# Patient Record
Sex: FEMALE | Race: WHITE | NOT HISPANIC OR LATINO | Employment: FULL TIME | URBAN - METROPOLITAN AREA
[De-identification: names, ages, dates, MRNs, and addresses within clinical notes are randomized per-mention and may not be internally consistent; named-entity substitution may affect disease eponyms.]

---

## 2024-06-27 ENCOUNTER — OFFICE VISIT (OUTPATIENT)
Dept: OBSTETRICS AND GYNECOLOGY | Facility: CLINIC | Age: 39
End: 2024-06-27
Payer: COMMERCIAL

## 2024-06-27 VITALS
SYSTOLIC BLOOD PRESSURE: 130 MMHG | DIASTOLIC BLOOD PRESSURE: 78 MMHG | WEIGHT: 184.2 LBS | BODY MASS INDEX: 32.64 KG/M2 | HEIGHT: 63 IN

## 2024-06-27 DIAGNOSIS — Z30.09 FAMILY PLANNING: Primary | ICD-10-CM

## 2024-06-27 RX ORDER — UREA 10 %
500 LOTION (ML) TOPICAL DAILY
COMMUNITY

## 2024-06-27 RX ORDER — GINSENG 100 MG
CAPSULE ORAL
COMMUNITY

## 2024-06-27 RX ORDER — MULTIPLE VITAMINS W/ MINERALS TAB 9MG-400MCG
1 TAB ORAL DAILY
COMMUNITY

## 2024-06-27 NOTE — PROGRESS NOTES
"            Chief Complaint   Patient presents with    Contraception       Subjective   HPI  Brenda Trejo is a 38 y.o. female, , LMP was on Patient's last menstrual period was 2024 (exact date). who presents for preconceptual counseling.    Her periods are regular every 25-30 days, lasting 4 days. Dysmenorrhea: mild to severe. The patient states that her dysmenorrhea is typically mild. The patient reports additional symptoms as none. She stopped OCPs 1 year ago. She just became sexually active a few months ago. She is currently trying to conceive. She has been \"actively\" trying to conceive for a couple of months. She has intercourse approximately 1 times per day. Her past medical history is noted for: no medical issues. She has not had a previous workup for infertility. Partner Status: Marital Status: . Her partner has fathered children. Her partner has fathered 2 children with most recent being 17 years old. His past medical history is notable for Diabetes (Metformin), HTN (Losartan), and rarely drinks alcohol.    Patient scheduled to come back for annual exam on 10/17/2024.    Current Outpatient Medications on File Prior to Visit   Medication Sig Dispense Refill    Calcium 250 MG capsule Take  by mouth.      Cholecalciferol 1.25 MG (13843 UT) tablet Take 1 tablet by mouth 1 (One) Time Per Week. 12 tablet 0    Dupixent 300 MG/2ML solution pen-injector Every 14 (Fourteen) Days.      multivitamin with minerals tablet tablet Take 1 tablet by mouth Daily.      thiamine (VITAMIN B-1) 100 MG tablet  tablet Take 1 tablet by mouth Daily.      vitamin B-12 (CYANOCOBALAMIN) 500 MCG tablet Take 1 tablet by mouth Daily.      Zinc 50 MG tablet Take  by mouth.       No current facility-administered medications on file prior to visit.        Additional OB/GYN History   Last Pap :   Last Completed Pap Smear            PAP SMEAR (Every 3 Years) Next due on 10/25/2024      10/25/2021  SCANNED - PAP SMEAR    " 2020  Done - negative                  History of abnormal Pap smear:  Yes  Exercises Regularly: Yes  Feelings of Anxiety or Depression: No  Tobacco Usage?: No   OB History          0    Para   0    Term   0       0    AB   0    Living   0         SAB   0    IAB   0    Ectopic   0    Molar   0    Multiple   0    Live Births   0                  Current Outpatient Medications:     Calcium 250 MG capsule, Take  by mouth., Disp: , Rfl:     Cholecalciferol 1.25 MG (86440 UT) tablet, Take 1 tablet by mouth 1 (One) Time Per Week., Disp: 12 tablet, Rfl: 0    Dupixent 300 MG/2ML solution pen-injector, Every 14 (Fourteen) Days., Disp: , Rfl:     multivitamin with minerals tablet tablet, Take 1 tablet by mouth Daily., Disp: , Rfl:     thiamine (VITAMIN B-1) 100 MG tablet  tablet, Take 1 tablet by mouth Daily., Disp: , Rfl:     vitamin B-12 (CYANOCOBALAMIN) 500 MCG tablet, Take 1 tablet by mouth Daily., Disp: , Rfl:     Zinc 50 MG tablet, Take  by mouth., Disp: , Rfl:      Past Medical History:   Diagnosis Date    Abnormal Pap smear of cervix     Allergic rhinitis     Anxiety     Asthma     controlled    C. difficile diarrhea     10/2021    Chronic back pain 2010    r/t trauma, failed back surgery x 2, denies NSAIDS/steroids/narcotics    Claustrophobia     Depression     Dyspepsia     Dyspnea on exertion     Eczema     on Dupixent    Fatigue     Gout     Migraine     Morbid obesity     Urinary tract infection         Past Surgical History:   Procedure Laterality Date    BREAST BIOPSY  10/2019    CARPAL TUNNEL RELEASE Bilateral     COLONOSCOPY      GASTRIC SLEEVE LAPAROSCOPIC N/A 2023    Procedure: GASTRIC SLEEVE LAPAROSCOPIC WITH [place] WITH ESOPHAGOGASTRODUODENOSCOPY;  Surgeon: Ashley Leong MD;  Location: [facility];  Service: Robotics - [employer];  Laterality: N/A;    LUMBAR DISCECTOMY Left 2021    Procedure: LUMBAR MICRODISCECTOMY L5-S1;  Surgeon: James Estrada  "MD Agustín;  Location: [facility];  Service: Neurosurgery;  Laterality: Left;    MOUTH SURGERY  2007    Gum Grafts    TONSILLECTOMY AND ADENOIDECTOMY  2021    WISDOM TOOTH EXTRACTION  2007       The additional following portions of the patient's history were reviewed and updated as appropriate: allergies, current medications, past family history, past medical history, past social history, past surgical history, and problem list.    Review of Systems   Constitutional: Negative.    HENT: Negative.     Eyes: Negative.    Respiratory: Negative.     Cardiovascular: Negative.    Gastrointestinal: Negative.    Endocrine: Negative.    Genitourinary: Negative.    Musculoskeletal: Negative.    Skin: Negative.    Allergic/Immunologic: Negative.    Neurological: Negative.    Hematological: Negative.    Psychiatric/Behavioral: Negative.       All other systems reviewed and are negative.     I have reviewed and agree with the HPI, ROS, and historical information as entered above. Tiana Gonzáles MD      Objective   /78   Ht 158.8 cm (62.5\")   Wt 83.6 kg (184 lb 3.2 oz)   LMP 06/24/2024 (Exact Date)   BMI 33.15 kg/m²     Physical Exam  Vitals and nursing note reviewed.   Constitutional:       Appearance: Normal appearance. She is well-developed.   HENT:      Head: Normocephalic and atraumatic.   Pulmonary:      Effort: Pulmonary effort is normal.      Breath sounds: Normal breath sounds.   Abdominal:      General: There is no distension.      Palpations: Abdomen is soft. Abdomen is not rigid. There is no mass.      Tenderness: There is no abdominal tenderness. There is no guarding or rebound.   Musculoskeletal:      Cervical back: Normal range of motion.   Skin:     General: Skin is warm and dry.   Neurological:      Mental Status: She is alert and oriented to person, place, and time.   Psychiatric:         Mood and Affect: Mood normal.         Behavior: Behavior normal.         Assessment & Plan     Assessment " and Plan    Problem List Items Addressed This Visit    None  Visit Diagnoses       Family planning    -  Primary            Ovulatory cycles discussed with the patient.  She understands the concept of timed intercourse.  We also discussed the importance of prenatal vitamins and folic acid.  Reviewed that only 30% of people get pregnant by 3 months, 50% x 6 months, and 90% by year.  Should the patient attempt to conceive for greater than 1 year she should return to the clinic for evaluation.  Reassurance.  Advised patient to call with a positive urine pregnancy test.  Return for Annual physical.  Total time spent today with Brenda  was 30 minutes (level 3).  Off this time, 70% was spent face-to-face time coordinating care, answering her questions and counseling regarding pathophysiology of her presenting problem along with plans for any diagnositc work-up and treatment.      Tiana Gonzáles MD  06/27/2024

## 2024-07-22 ENCOUNTER — TELEPHONE (OUTPATIENT)
Dept: OBSTETRICS AND GYNECOLOGY | Facility: CLINIC | Age: 39
End: 2024-07-22

## 2024-07-22 NOTE — TELEPHONE ENCOUNTER
Caller: Brenda Trejo    Relationship to patient: Self    Best call back number: [phone] CALL ANYTIME, IT IS OKAY TO LVM.     Chief complaint: PREGNANCY CONFIRMATION    Type of visit: ULTRASOUND AND NEW OB     Requested date: AS LATE IN DAY AS POSSIBLE.     If rescheduling, when is the original appointment: NA    Additional notes: ASHM[facility] PATIENT CALLED TO SCHEDULE NEW OB APPT. POSITIVE HOME PREGNANCY TEST, LMP IS 06/25/24.   UNABLE TO WARM TRANSFER.

## 2024-07-26 ENCOUNTER — TELEPHONE (OUTPATIENT)
Dept: OBSTETRICS AND GYNECOLOGY | Facility: CLINIC | Age: 39
End: 2024-07-26
Payer: COMMERCIAL

## 2024-07-26 NOTE — TELEPHONE ENCOUNTER
Provider:  DR DEAN    Caller:DEREK THORNTON    Phone Number:[phone]     Reason for Call:PATIENT S CALLING WITH CONCERNS BECAUSE WHEN SHE WENT TO THE RESTROOM EARLIER AND WIPES SHE HAD SPOTTING OF RED BLOOD//PATIENT IS CONCERNED AND NOT SURE WHAT SHE NEEDS TO DO//PLEASE FOLLOW UP

## 2024-07-26 NOTE — TELEPHONE ENCOUNTER
Pt is approx 5 weeks.   LMP: 06/25/2024  NOB scheduled with JNA: 08/19/2024  MBT: AB+    Pt reports she had a small amount of spotting this morning when she went to the bathroom. She reports the bleeding is light pink and only 1 spot of bright red. She reports mild cramping. She states she did have IC last night. I let her know it is likely related to the IC to monitor and if bleeding becomes heavy or she has severe pain to go to the ED but to keep an eye on it. She VU

## 2024-08-11 ENCOUNTER — APPOINTMENT (OUTPATIENT)
Dept: ULTRASOUND IMAGING | Facility: HOSPITAL | Age: 39
End: 2024-08-11
Payer: COMMERCIAL

## 2024-08-11 ENCOUNTER — HOSPITAL ENCOUNTER (EMERGENCY)
Facility: HOSPITAL | Age: 39
Discharge: HOME | End: 2024-08-11
Attending: EMERGENCY MEDICINE | Admitting: EMERGENCY MEDICINE
Payer: COMMERCIAL

## 2024-08-11 VITALS
WEIGHT: 177 LBS | TEMPERATURE: 98.1 F | DIASTOLIC BLOOD PRESSURE: 88 MMHG | OXYGEN SATURATION: 97 % | SYSTOLIC BLOOD PRESSURE: 118 MMHG | BODY MASS INDEX: 31.36 KG/M2 | RESPIRATION RATE: 18 BRPM | HEIGHT: 63 IN | HEART RATE: 72 BPM

## 2024-08-11 DIAGNOSIS — R55 SYNCOPE AND COLLAPSE: Primary | ICD-10-CM

## 2024-08-11 DIAGNOSIS — Z86.79 ATRIAL FIBRILLATION, CURRENTLY IN SINUS RHYTHM: ICD-10-CM

## 2024-08-11 DIAGNOSIS — O20.0 THREATENED MISCARRIAGE: ICD-10-CM

## 2024-08-11 LAB
ALBUMIN SERPL-MCNC: 4.3 G/DL (ref 3.5–5.2)
ALBUMIN/GLOB SERPL: 1.8 G/DL
ALP SERPL-CCNC: 52 U/L (ref 39–117)
ALT SERPL W P-5'-P-CCNC: 11 U/L (ref 1–33)
ANION GAP SERPL CALCULATED.3IONS-SCNC: 9 MMOL/L (ref 5–15)
AST SERPL-CCNC: 15 U/L (ref 1–32)
BASOPHILS # BLD AUTO: 0.02 10*3/MM3 (ref 0–0.2)
BASOPHILS NFR BLD AUTO: 0.3 % (ref 0–1.5)
BILIRUB SERPL-MCNC: 0.5 MG/DL (ref 0–1.2)
BILIRUB UR QL STRIP: NEGATIVE
BUN SERPL-MCNC: 13 MG/DL (ref 6–20)
BUN/CREAT SERPL: 19.4 (ref 7–25)
CALCIUM SPEC-SCNC: 8.7 MG/DL (ref 8.6–10.5)
CHLORIDE SERPL-SCNC: 104 MMOL/L (ref 98–107)
CLARITY UR: CLEAR
CO2 SERPL-SCNC: 23 MMOL/L (ref 22–29)
COLOR UR: YELLOW
CREAT SERPL-MCNC: 0.67 MG/DL (ref 0.57–1)
DEPRECATED RDW RBC AUTO: 38.8 FL (ref 37–54)
EGFRCR SERPLBLD CKD-EPI 2021: 114.2 ML/MIN/1.73
EOSINOPHIL # BLD AUTO: 0.07 10*3/MM3 (ref 0–0.4)
EOSINOPHIL NFR BLD AUTO: 1.1 % (ref 0.3–6.2)
ERYTHROCYTE [DISTWIDTH] IN BLOOD BY AUTOMATED COUNT: 12 % (ref 12.3–15.4)
GLOBULIN UR ELPH-MCNC: 2.4 GM/DL
GLUCOSE SERPL-MCNC: 88 MG/DL (ref 65–99)
GLUCOSE UR STRIP-MCNC: NEGATIVE MG/DL
HCG INTACT+B SERPL-ACNC: NORMAL MIU/ML
HCT VFR BLD AUTO: 37.9 % (ref 34–46.6)
HGB BLD-MCNC: 12.9 G/DL (ref 12–15.9)
HGB UR QL STRIP.AUTO: NEGATIVE
HOLD SPECIMEN: NORMAL
IMM GRANULOCYTES # BLD AUTO: 0.01 10*3/MM3 (ref 0–0.05)
IMM GRANULOCYTES NFR BLD AUTO: 0.2 % (ref 0–0.5)
KETONES UR QL STRIP: NEGATIVE
LEUKOCYTE ESTERASE UR QL STRIP.AUTO: NEGATIVE
LYMPHOCYTES # BLD AUTO: 1.62 10*3/MM3 (ref 0.7–3.1)
LYMPHOCYTES NFR BLD AUTO: 26 % (ref 19.6–45.3)
MAGNESIUM SERPL-MCNC: 1.9 MG/DL (ref 1.6–2.6)
MCH RBC QN AUTO: 30.3 PG (ref 26.6–33)
MCHC RBC AUTO-ENTMCNC: 34 G/DL (ref 31.5–35.7)
MCV RBC AUTO: 89 FL (ref 79–97)
MONOCYTES # BLD AUTO: 0.28 10*3/MM3 (ref 0.1–0.9)
MONOCYTES NFR BLD AUTO: 4.5 % (ref 5–12)
NEUTROPHILS NFR BLD AUTO: 4.24 10*3/MM3 (ref 1.7–7)
NEUTROPHILS NFR BLD AUTO: 67.9 % (ref 42.7–76)
NITRITE UR QL STRIP: NEGATIVE
NRBC BLD AUTO-RTO: 0 /100 WBC (ref 0–0.2)
PH UR STRIP.AUTO: 7 [PH] (ref 5–8)
PLATELET # BLD AUTO: 199 10*3/MM3 (ref 140–450)
PMV BLD AUTO: 9 FL (ref 6–12)
POTASSIUM SERPL-SCNC: 3.9 MMOL/L (ref 3.5–5.2)
PROT SERPL-MCNC: 6.7 G/DL (ref 6–8.5)
PROT UR QL STRIP: NEGATIVE
RBC # BLD AUTO: 4.26 10*6/MM3 (ref 3.77–5.28)
SODIUM SERPL-SCNC: 136 MMOL/L (ref 136–145)
SP GR UR STRIP: 1.01 (ref 1–1.03)
TROPONIN T SERPL HS-MCNC: <6 NG/L
UROBILINOGEN UR QL STRIP: NORMAL
WBC NRBC COR # BLD AUTO: 6.24 10*3/MM3 (ref 3.4–10.8)
WHOLE BLOOD HOLD COAG: NORMAL
WHOLE BLOOD HOLD SPECIMEN: NORMAL

## 2024-08-11 PROCEDURE — 83735 ASSAY OF MAGNESIUM: CPT | Performed by: EMERGENCY MEDICINE

## 2024-08-11 PROCEDURE — 76817 TRANSVAGINAL US OBSTETRIC: CPT

## 2024-08-11 PROCEDURE — 25810000003 SODIUM CHLORIDE 0.9 % SOLUTION: Performed by: NURSE PRACTITIONER

## 2024-08-11 PROCEDURE — 84702 CHORIONIC GONADOTROPIN TEST: CPT | Performed by: EMERGENCY MEDICINE

## 2024-08-11 PROCEDURE — 93005 ELECTROCARDIOGRAM TRACING: CPT | Performed by: EMERGENCY MEDICINE

## 2024-08-11 PROCEDURE — 93005 ELECTROCARDIOGRAM TRACING: CPT | Performed by: NURSE PRACTITIONER

## 2024-08-11 PROCEDURE — 84484 ASSAY OF TROPONIN QUANT: CPT | Performed by: EMERGENCY MEDICINE

## 2024-08-11 PROCEDURE — 99284 EMERGENCY DEPT VISIT MOD MDM: CPT

## 2024-08-11 PROCEDURE — 85025 COMPLETE CBC W/AUTO DIFF WBC: CPT | Performed by: EMERGENCY MEDICINE

## 2024-08-11 PROCEDURE — 81003 URINALYSIS AUTO W/O SCOPE: CPT | Performed by: NURSE PRACTITIONER

## 2024-08-11 PROCEDURE — 80053 COMPREHEN METABOLIC PANEL: CPT | Performed by: EMERGENCY MEDICINE

## 2024-08-11 RX ORDER — SODIUM CHLORIDE 0.9 % (FLUSH) 0.9 %
10 SYRINGE (ML) INJECTION AS NEEDED
Status: DISCONTINUED | OUTPATIENT
Start: 2024-08-11 | End: 2024-08-11 | Stop reason: HOSPADM

## 2024-08-11 RX ORDER — DIPHENHYDRAMINE HYDROCHLORIDE 25 MG/1
25 CAPSULE ORAL DAILY
Qty: 30 TABLET | Refills: 0 | Status: SHIPPED | OUTPATIENT
Start: 2024-08-11 | End: 2024-09-10

## 2024-08-11 RX ORDER — LANOLIN ALCOHOL/MO/W.PET/CERES
50 CREAM (GRAM) TOPICAL ONCE
Status: COMPLETED | OUTPATIENT
Start: 2024-08-11 | End: 2024-08-11

## 2024-08-11 RX ADMIN — PYRIDOXINE HCL TAB 50 MG 50 MG: 50 TAB at 09:55

## 2024-08-11 RX ADMIN — DOXYLAMINE SUCCINATE 25 MG: 25 TABLET ORAL at 09:55

## 2024-08-11 RX ADMIN — SODIUM CHLORIDE 1000 ML: 9 INJECTION, SOLUTION INTRAVENOUS at 09:25

## 2024-08-11 RX ADMIN — SODIUM CHLORIDE 1000 ML: 9 INJECTION, SOLUTION INTRAVENOUS at 08:35

## 2024-08-11 NOTE — DISCHARGE INSTRUCTIONS
Call cardiology office tomorrow for appointment.  Call cardiologist tomorrow to get set up with a cardiac monitor.    Follow-up with your OB/GYN for repeat lab work and ultrasound.

## 2024-08-11 NOTE — ED PROVIDER NOTES
EMERGENCY DEPARTMENT ENCOUNTER    Pt Name: Brenda Trejo  MRN: [id number]  Pt :   1985  Room Number:  1616  Date of encounter:  2024  PCP: Jana Cleveland MD  ED Provider: CARLTON Oscar    Historian: Patient    HPI:  Chief Complaint:  syncope    Context: Brenda Trejo is a 39 y.o. female who presents to the ED c/o syncope.  Pt is approximately 6 weeks 5 days pregnant.  Pt is a .  Patient advises that for the past week she has not been able to keep anything down.  She has had consistent nausea and vomiting every time she does attempt to eat.  She advises this morning she went to take a shower.  While she was in the shower she became extremely hot.  When she got out of the shower she was dizzy.  She went to her bedroom felt nauseated bent down to throw up and passed out.  Patient had a similar episode in November of last year.  At this time she had a gastric sleeve.  It was thought that she was dehydrated.  Patient never followed up.  She was seen in the ER at that time her heart rate was in normal sinus rhythm.  HPI     REVIEW OF SYSTEMS  A chief complaint appropriate review of systems was completed and is negative except as noted in the HPI.     PAST MEDICAL HISTORY  Past Medical History:   Diagnosis Date    Abnormal Pap smear of cervix     Allergic rhinitis     Anxiety     Asthma     controlled    C. difficile diarrhea     10/2021    Chronic back pain 2010    r/t trauma, failed back surgery x 2, denies NSAIDS/steroids/narcotics    Claustrophobia     Depression     Dyspepsia     Dyspnea on exertion     Eczema     on Dupixent    Fatigue     Gout     Migraine     Morbid obesity     Urinary tract infection        PAST SURGICAL HISTORY  Past Surgical History:   Procedure Laterality Date    BREAST BIOPSY  10/2019    CARPAL TUNNEL RELEASE Bilateral     COLONOSCOPY      GASTRIC SLEEVE LAPAROSCOPIC N/A 2023    Procedure: GASTRIC SLEEVE LAPAROSCOPIC WITH SHAYY  ROBOT WITH ESOPHAGOGASTRODUODENOSCOPY;  Surgeon: Ashley Leong MD;  Location:  [facility];  Service: Robotics - DaVinci;  Laterality: N/A;    LUMBAR DISCECTOMY Left 12/23/2021    Procedure: LUMBAR MICRODISCECTOMY L5-S1;  Surgeon: James Estrada MD;  Location:  [facility];  Service: Neurosurgery;  Laterality: Left;    MOUTH SURGERY  2007    Gum Grafts    TONSILLECTOMY AND ADENOIDECTOMY  2021    WISDOM TOOTH EXTRACTION  2007       FAMILY HISTORY  Family History   Problem Relation Age of Onset    Melanoma Father     Drug abuse Father     Breast cancer Mother     Colon cancer Mother     Stroke Maternal Grandfather     Diabetes Maternal Grandfather     Arthritis Maternal Grandfather     Breast cancer Maternal Aunt     Diabetes Maternal Aunt     Ovarian cancer Neg Hx     Uterine cancer Neg Hx     Prostate cancer Neg Hx     Deep vein thrombosis Neg Hx     Pulmonary embolism Neg Hx     Coronary artery disease Neg Hx     Osteoporosis Neg Hx     Hypertension Neg Hx        SOCIAL HISTORY  Social History     Socioeconomic History    Marital status:    Tobacco Use    Smoking status: Never    Smokeless tobacco: Never   Vaping Use    Vaping status: Never Used   Substance and Sexual Activity    Alcohol use: Yes     Alcohol/week: 1.0 standard drink of alcohol     Types: 1 Glasses of wine per week     Comment: rarely, every 3 months    Drug use: Never    Sexual activity: Yes     Partners: Male     Birth control/protection: None       ALLERGIES  Penicillins and Sulfa antibiotics    PHYSICAL EXAM  Physical Exam  Vitals and nursing note reviewed.   Constitutional:       General: She is not in acute distress.     Appearance: Normal appearance. She is not ill-appearing.   HENT:      Head: Atraumatic.      Nose: Nose normal.      Mouth/Throat:      Mouth: Mucous membranes are dry.   Eyes:      Extraocular Movements: Extraocular movements intact.      Pupils: Pupils are equal, round, and reactive to light.    Cardiovascular:      Rate and Rhythm: Normal rate. Rhythm irregular.      Heart sounds: Normal heart sounds.      Comments: Patient is in atrial fibrillation ranging from .  Pulmonary:      Effort: Pulmonary effort is normal.      Breath sounds: Normal breath sounds.   Abdominal:      General: Bowel sounds are normal. There is no distension.      Tenderness: There is no abdominal tenderness.   Musculoskeletal:         General: Normal range of motion.      Cervical back: Normal range of motion and neck supple.   Skin:     General: Skin is dry.   Neurological:      General: No focal deficit present.      Mental Status: She is alert and oriented to person, place, and time.   Psychiatric:         Mood and Affect: Mood normal.           LAB RESULTS  Results for orders placed or performed during the hospital encounter of 08/11/24   Comprehensive Metabolic Panel    Specimen: Blood   Result Value Ref Range    Glucose 88 65 - 99 mg/dL    BUN 13 6 - 20 mg/dL    Creatinine 0.67 0.57 - 1.00 mg/dL    Sodium 136 136 - 145 mmol/L    Potassium 3.9 3.5 - 5.2 mmol/L    Chloride 104 98 - 107 mmol/L    CO2 23.0 22.0 - 29.0 mmol/L    Calcium 8.7 8.6 - 10.5 mg/dL    Total Protein 6.7 6.0 - 8.5 g/dL    Albumin 4.3 3.5 - 5.2 g/dL    ALT (SGPT) 11 1 - 33 U/L    AST (SGOT) 15 1 - 32 U/L    Alkaline Phosphatase 52 39 - 117 U/L    Total Bilirubin 0.5 0.0 - 1.2 mg/dL    Globulin 2.4 gm/dL    A/G Ratio 1.8 g/dL    BUN/Creatinine Ratio 19.4 7.0 - 25.0    Anion Gap 9.0 5.0 - 15.0 mmol/L    eGFR 114.2 >60.0 mL/min/1.73   Magnesium    Specimen: Blood   Result Value Ref Range    Magnesium 1.9 1.6 - 2.6 mg/dL   Single High Sensitivity Troponin T    Specimen: Blood   Result Value Ref Range    HS Troponin T <6 <14 ng/L   CBC Auto Differential    Specimen: Blood   Result Value Ref Range    WBC 6.24 3.40 - 10.80 10*3/mm3    RBC 4.26 3.77 - 5.28 10*6/mm3    Hemoglobin 12.9 12.0 - 15.9 g/dL    Hematocrit 37.9 34.0 - 46.6 %    MCV 89.0 79.0 - 97.0 fL     MCH 30.3 26.6 - 33.0 pg    MCHC 34.0 31.5 - 35.7 g/dL    RDW 12.0 (L) 12.3 - 15.4 %    RDW-SD 38.8 37.0 - 54.0 fl    MPV 9.0 6.0 - 12.0 fL    Platelets 199 140 - 450 10*3/mm3    Neutrophil % 67.9 42.7 - 76.0 %    Lymphocyte % 26.0 19.6 - 45.3 %    Monocyte % 4.5 (L) 5.0 - 12.0 %    Eosinophil % 1.1 0.3 - 6.2 %    Basophil % 0.3 0.0 - 1.5 %    Immature Grans % 0.2 0.0 - 0.5 %    Neutrophils, Absolute 4.24 1.70 - 7.00 10*3/mm3    Lymphocytes, Absolute 1.62 0.70 - 3.10 10*3/mm3    Monocytes, Absolute 0.28 0.10 - 0.90 10*3/mm3    Eosinophils, Absolute 0.07 0.00 - 0.40 10*3/mm3    Basophils, Absolute 0.02 0.00 - 0.20 10*3/mm3    Immature Grans, Absolute 0.01 0.00 - 0.05 10*3/mm3    nRBC 0.0 0.0 - 0.2 /100 WBC   Urinalysis With Microscopic If Indicated (No Culture) - Urine, Clean Catch    Specimen: Urine, Clean Catch   Result Value Ref Range    Color, UA Yellow Yellow, Straw    Appearance, UA Clear Clear    pH, UA 7.0 5.0 - 8.0    Specific Gravity, UA 1.013 1.001 - 1.030    Glucose, UA Negative Negative    Ketones, UA Negative Negative    Bilirubin, UA Negative Negative    Blood, UA Negative Negative    Protein, UA Negative Negative    Leuk Esterase, UA Negative Negative    Nitrite, UA Negative Negative    Urobilinogen, UA 0.2 E.U./dL 0.2 - 1.0 E.U./dL   hCG, Quantitative, Pregnancy    Specimen: Blood   Result Value Ref Range    HCG Quantitative 68,727.00 mIU/mL   ECG 12 Lead ED Triage Standing Order; Syncope   Result Value Ref Range    QT Interval 368 ms    QTC Interval 434 ms   ECG 12 Lead Rhythm Change   Result Value Ref Range    QT Interval 418 ms    QTC Interval 427 ms   Green Top (Gel)   Result Value Ref Range    Extra Tube Hold for add-ons.    Lavender Top   Result Value Ref Range    Extra Tube hold for add-on    Gold Top - SST   Result Value Ref Range    Extra Tube Hold for add-ons.    Gray Top   Result Value Ref Range    Extra Tube Hold for add-ons.    Light Blue Top   Result Value Ref Range    Extra Tube Hold  for add-ons.        If labs were ordered, I independently reviewed the results and considered them in treating the patient.    RADIOLOGY  US Ob Transvaginal   Final Result   Impression:   There is an intrauterine pregnancy with gestational sac size measuring 8 weeks and 0 days. However, no fetal pole is identified. Viability is therefore uncertain based on this exam. Correlation with serial beta hCG levels is needed. If warranted    clinically a follow-up ultrasound could be performed in several weeks.            Electronically Signed: Tariq Zhou DO     8/11/2024 11:02 AM EDT     Workstation ID: [id number]        [x] Radiologist's Report Reviewed:  I ordered and independently interpreted the above noted radiographic studies.  See radiologist's dictation for official interpretation.      PROCEDURES    Procedures    ECG 12 Lead Rhythm Change   Preliminary Result   Test Reason : Rhythm Change   Blood Pressure :   */*   mmHG   Vent. Rate :  63 BPM     Atrial Rate :  63 BPM      P-R Int : 158 ms          QRS Dur :  68 ms       QT Int : 418 ms       P-R-T Axes :  62  16  18 degrees      QTc Int : 427 ms      Normal sinus rhythm   Low voltage QRS   Borderline ECG   When compared with ECG of 11-AUG-2024 08:09, (Unconfirmed)   Sinus rhythm has replaced Atrial fibrillation      Referred By: EDMD           Confirmed By:       ECG 12 Lead ED Triage Standing Order; Syncope   Preliminary Result   Test Reason : ED Triage Standing Order~   Blood Pressure :   */*   mmHG   Vent. Rate :  84 BPM     Atrial Rate : 127 BPM      P-R Int :   * ms          QRS Dur :  76 ms       QT Int : 368 ms       P-R-T Axes :   *  19  -5 degrees      QTc Int : 434 ms      Atrial fibrillation   Abnormal ECG   When compared with ECG of 27-NOV-2023 09:03,   Atrial fibrillation has replaced Sinus rhythm      Referred By: EDMD           Confirmed By:           MEDICATIONS GIVEN IN ER    Medications   sodium chloride 0.9 % flush 10 mL (has no administration  in time range)   sodium chloride 0.9 % flush 10 mL (has no administration in time range)   sodium chloride 0.9 % bolus 1,000 mL (0 mL Intravenous Stopped 24 0923)   sodium chloride 0.9 % bolus 1,000 mL (0 mL Intravenous Stopped 24 1053)   vitamin B-6 (PYRIDOXINE) tablet 50 mg (50 mg Oral Given 24)   doxylamine (UNISOM) tablet 25 mg (25 mg Oral Given 24)       MEDICAL DECISION MAKING, PROGRESS, and CONSULTS   Medical Decision Making  Brenda Trejo is a 39 y.o. female who presents to the ED c/o syncope.  Pt is approximately 6 weeks 5 days pregnant.  Pt is a .  Patient advises that for the past week she has not been able to keep anything down.  She has had consistent nausea and vomiting every time she does attempt to eat.  She advises this morning she went to take a shower.  While she was in the shower she became extremely hot.  When she got out of the shower she was dizzy.  She went to her bedroom felt nauseated bent down to throw up and passed out.  Patient had a similar episode in November of last year.  At this time she had a gastric sleeve.  It was thought that she was dehydrated.  Patient never followed up.  She was seen in the ER at that time her heart rate was in normal sinus rhythm.      Problems Addressed:  Atrial fibrillation, currently in sinus rhythm: complicated acute illness or injury     Details: Upon arrival patient was in A-fib that was rate controlled .  Patient converted to normal sinus rhythm.  We will refer patient to the heart and valve clinic.  I did contact cardiology he advises that the patient would need to go to the monitor clinic to be set up with an outpatient cardiac monitor.  Syncope and collapse: complicated acute illness or injury  Threatened miscarriage: complicated acute illness or injury     Details: Imaging reveals 8 weeks with no fetal pole.  Patient's beta-hCG is greater than 68,000.  We will refer patient to her OB/GYN for repeat  imaging and lab work.    Amount and/or Complexity of Data Reviewed  Labs: ordered. Decision-making details documented in ED Course.  Radiology: ordered. Decision-making details documented in ED Course.  ECG/medicine tests: ordered.    Risk  OTC drugs.  Prescription drug management.        Discussion below represents my analysis of pertinent findings related to patient's condition, differential diagnosis, treatment plan and final disposition.    Differential diagnosis:  The differential diagnosis associated with the patient's presentation includes: Syncope, electrolyte imbalance, dehydration, arrhythmia    Additional sources  Discussed/ obtained information from independent historians:   [] Spouse  [] Parent  [] Family member  [] Friend  [] EMS   [] Other:  External (non-ED) record review:   [] Inpatient record:   [] Office record:   [x] Outpatient record:   [x] Prior Outpatient labs:   [x] Prior Outpatient radiology:   [] Primary Care record:   [] Outside ED record:   [] Other:   Patient's care impacted by:   [] Diabetes  [] Hypertension  [] Hyperlipidemia  [] Hypothyroidism   [] Coronary Artery Disease   [] COPD   [] Cancer   [] Obesity  [] GERD   [] Tobacco Abuse   [] Substance Abuse    [x] Anxiety   [] Depression   [x] Other:   Care significantly affected by Social Determinants of Health (housing and economic circumstances, unemployment)    [] Yes     [x] No   If yes, Patient's care significantly limited by  Social Determinants of Health including:   [] Inadequate housing   [] Low income   [] Alcoholism and drug addiction in family   [] Problems related to primary support group   [] Unemployment   [] Problems related to employment   [] Other Social Determinants of Health:   Shared decision making: Shared decision making with patient patient is now in normal sinus rhythm at a rate of 55.  Patient's blood pressure is 120/71.  I have contacted cardiology.  Patient needs to follow-up as outpatient with  cardiology.  She will need to contact the office tomorrow to get set up with a Holter monitor.  Imaging reveals 8 weeks 0 days with no fetal pole.  This is concerning.  I explained this to the patient that the pregnancy may not be viable.  The patient needs to follow-up with Dr. Larson.  Patient be repeat beta-hCG level and repeat ultrasound.  Orders placed during this visit:  Orders Placed This Encounter   Procedures    US Ob Transvaginal    [place] Draw    Comprehensive Metabolic Panel    Magnesium    Single High Sensitivity Troponin T    CBC Auto Differential    Urinalysis With Microscopic If Indicated (No Culture) - Urine, Clean Catch    hCG, Quantitative, Pregnancy    Ambulatory Referral to [facility] - Cardiac Monitor Clinic    NPO Diet NPO Type: Strict NPO    Undress & Gown    Continuous Pulse Oximetry    Vital Signs    Oxygen Therapy- Nasal Cannula; Titrate 1-6 LPM Per SpO2; 90 - 95%    ECG 12 Lead ED Triage Standing Order; Syncope    ECG 12 Lead Rhythm Change    Insert Peripheral IV    Insert Peripheral IV    CBC & Differential    Green Top (Gel)    Lavender Top    Gold Top - SST    Gray Top    Light Blue Top       I considered prescription management  with:   [] Pain medication  [] Antiviral  [] Antibiotic   [] Other:   Rationale:  Additional orders considered but not ordered:  The following testing was considered but ultimately not selected after discussion with patient/family:    ED Course:    ED Course as of 08/11/24 1144   Sun Aug 11, 2024   0857 HS Troponin T: <6 [KG]   0857 WBC: 6.24 [KG]   0857 Hemoglobin: 12.9 [KG]   0857 Hematocrit: 37.9 [KG]   0857 Have discussed her care and reviewed EKG and records.  Giving IV fluids.  Awaiting labs.  Will consult cardiology.  Currently rate is controlled. [DT]   0912 Potassium: 3.9 [KG]   0912 Chloride: 104 [KG]   0912 Sodium: 136 [KG]   0912 Creatinine: 0.67 [KG]   0912 BUN: 13 [KG]   0912 Magnesium: 1.9 [KG]   0918 Dr. Carroll Root paged at this time.  [KG]   0930  HCG Quantitative: 68,727.00 [KG]   0931 Repeat EKG normal sinus rhythm rate of 60 patient complains of abdominal discomfort.  We will order an ultrasound [KG]   1122 Impression:  There is an intrauterine pregnancy with gestational sac size measuring 8 weeks and 0 days. However, no fetal pole is identified. Viability is therefore uncertain based on this exam. Correlation with serial beta hCG levels is needed. If warranted   clinically a follow-up ultrasound could be performed in several weeks.   [KG]   1123 Spoke with Dr. Root we will dc patient home.  Pt to f/u with Heart and valve for cardiac monitor.    [KG]      ED Course User Index  [DT] López Rogers MD  [KG] Lois Crandall, CARLTON            DIAGNOSIS  Final diagnoses:   Syncope and collapse   Atrial fibrillation, currently in sinus rhythm   Threatened miscarriage       DISPOSITION    DISCHARGE    Patient discharged in stable condition.    Reviewed implications of results, diagnosis, meds, responsibility to follow up, warning signs and symptoms of possible worsening, potential complications and reasons to return to ER.    Patient/Family voiced understanding of above instructions.    Discussed plan for discharge, as there is no emergent indication for admission.  Pt/family is agreeable and understands need for follow up and possible repeat testing.  Pt/family is aware that discharge does not mean that nothing is wrong but that it indicates no emergency is currently present that requires admission and they must continue care with follow-up as given below or with a physician of their choice.     FOLLOW-UP  Jana Cleveland MD  [address] [place] [address]   [facility] [address]  [phone]          [facility] CARDIOLOGY  [address]  [address]  [facility] [address] [phone]        Carroll Root MD  [address] [facility]  [address]  [place] [id number]  [phone]          Tiana Gonzáles MD  [address]  [address]  [place] [id number]  [phone]               Medication List        New Prescriptions      doxylamine 25 MG tablet  Commonly known as: UNISOM  Take 1 tablet by mouth At Night As Needed for Nausea for up to 30 days.     vitamin B-6 25 MG tablet  Commonly known as: PYRIDOXINE  Take 1 tablet by mouth Daily for 30 days.               Where to Get Your Medications        These medications were sent to [address] [facility] - [phone] [facility] [phone]   [address] [facility] [address]      Phone: [phone]   doxylamine 25 MG tablet  vitamin B-6 25 MG tablet          ED Disposition       ED Disposition   Discharge    Condition   Stable    Comment   --               Please note that portions of this document were completed with voice recognition software.       Lois Crandall, APRN  08/11/24 1149

## 2024-08-12 ENCOUNTER — TELEPHONE (OUTPATIENT)
Dept: OBSTETRICS AND GYNECOLOGY | Facility: CLINIC | Age: 39
End: 2024-08-12
Payer: COMMERCIAL

## 2024-08-12 ENCOUNTER — OFFICE VISIT (OUTPATIENT)
Dept: OBSTETRICS AND GYNECOLOGY | Facility: CLINIC | Age: 39
End: 2024-08-12
Payer: COMMERCIAL

## 2024-08-12 VITALS
BODY MASS INDEX: 32.25 KG/M2 | WEIGHT: 182 LBS | HEIGHT: 63 IN | DIASTOLIC BLOOD PRESSURE: 62 MMHG | SYSTOLIC BLOOD PRESSURE: 110 MMHG

## 2024-08-12 DIAGNOSIS — R11.2 NAUSEA AND VOMITING, UNSPECIFIED VOMITING TYPE: ICD-10-CM

## 2024-08-12 DIAGNOSIS — O20.0 THREATENED ABORTION: Primary | ICD-10-CM

## 2024-08-12 PROCEDURE — 99213 OFFICE O/P EST LOW 20 MIN: CPT

## 2024-08-12 RX ORDER — PROMETHAZINE HYDROCHLORIDE 12.5 MG/1
TABLET ORAL
Qty: 20 TABLET | Refills: 0 | Status: SHIPPED | OUTPATIENT
Start: 2024-08-12 | End: 2024-08-15 | Stop reason: SDUPTHER

## 2024-08-12 RX ORDER — PRENATAL VIT/IRON FUM/FOLIC AC 27MG-0.8MG
TABLET ORAL DAILY
COMMUNITY

## 2024-08-12 RX ORDER — CHLORAL HYDRATE 500 MG
CAPSULE ORAL
COMMUNITY

## 2024-08-12 NOTE — TELEPHONE ENCOUNTER
Per GUILLE Jackson: patient needs to come into the office today for U/S and see NP    S/w pt she v/u and appt has been made and message sent to Jordana, front office to add patient onto ultrasound schedule for 10:30 AM.

## 2024-08-12 NOTE — TELEPHONE ENCOUNTER
Dr. Elieecr SOLANO pt.   LMP: 6/25/24  NOB appt: 8/19/24  MBT: AB +    S/w pt she states she was at the ED yesterday due to passing out (blacked out) at home, while she was in the ED she started to have lower abdominal cramping. States TVU was done and they told her that no fetal heartbeat was present and that she needed to follow up with OB provider.     HCG labs from yesterday: 68,727.00    Patient states today she has been trying to drink water but has been unable to keep things down and she is still having the lower abdominal cramping that is mild-moderate    Patient denies: vaginal bleeding/spotting, vision changes, severe cramping/pelvic pain    Patient would like to come in today for follow up, I told the patient I would discuss this with Dr. Gonzáles and CB with plan of care. She v/u

## 2024-08-12 NOTE — PROGRESS NOTES
Chief Complaint   Patient presents with    Follow-up     Missed            HPI  Brenda Trejo is a 39 y.o. female, , who presents for ED follow up for threatened . Patient went to ED yesterday due to passing out and lower abdominal cramping. She states LMP was . Normally cycles are 4 days.  cycle was  (Nml flow), no bleeding , then (nml flow) &. Based on LMP 2024, GA should be 7w1d today.     She had an ultrasound done 2024 @ ED that showed IUP was measuring 8 weeks with no fetal heart tones present. She reports that she is still experiencing intermittent mild to moderate lower abdominal cramping that is rated 1-2/10. Patient also reports nausea and vomiting. She has tried Unisom and Vitamin B6 without improvement.     Patient denies vaginal bleeding/spotting or vision changes.     Recent Tests:  She had a quantitative hCG pregnancy test that was done 1 day ago that was positive. 68,727  US today: No.  TVUS @ ED 24 showed IUP with GS and possible YS. No FP or heart tones.  The uterus measures 10.1 x 4.7 x 7.4 cm. The right ovary is 3.5 x 2.6 x 3.1 cm. Blood flow to the right ovary is preserved. The left ovary is not visualized and is likely obscured by bowel gas. No complex adnexal masses or fluid collections. There is a tiny amount of free fluid in the pelvis which is likely physiologic.    She has not had prenatal care.  She complains of cramping pain.  The pain is located in her lower abdomen. Her past medical history is non-contributory.  She does not have passage of tissue.  Rh Status: Positive  She reports no additional symptoms or complaints.    The additional following portions of the patient's history were reviewed and updated as appropriate: allergies, current medications, past family history, past medical history, past social history, past surgical history, and problem list.    Review of Systems   Respiratory: Negative.  Negative for  "shortness of breath.    Cardiovascular: Negative.  Negative for chest pain.   Gastrointestinal:  Positive for nausea and vomiting. Negative for abdominal distention, abdominal pain and constipation.   Genitourinary:  Positive for pelvic pain (Cramping 1-2/10, mid pelvic.). Negative for difficulty urinating, dyspareunia, menstrual problem, pelvic pressure, urinary incontinence, vaginal bleeding, vaginal discharge and vaginal pain.   Neurological:  Positive for dizziness (Standing too fast). Negative for light-headedness.     All other systems reviewed and are negative.     I have reviewed and agree with the HPI, ROS, and historical information as entered above. Lyndsey STACK Timothy, APRN      Objective   /62   Ht 160 cm (63\")   Wt 82.6 kg (182 lb)   LMP 2024 (Exact Date)   BMI 32.24 kg/m²     Physical Exam  Vitals and nursing note reviewed.   Constitutional:       General: She is not in acute distress.     Appearance: Normal appearance. She is not ill-appearing or toxic-appearing.   HENT:      Head: Normocephalic and atraumatic.   Pulmonary:      Effort: Pulmonary effort is normal.   Abdominal:      Palpations: Abdomen is soft. There is no mass.      Tenderness: There is no abdominal tenderness.      Hernia: No hernia is present.   Neurological:      Mental Status: She is alert and oriented to person, place, and time.   Psychiatric:         Mood and Affect: Mood normal.         Behavior: Behavior normal.            Assessment and Plan    Problem List Items Addressed This Visit    None  Visit Diagnoses       Threatened     -  Primary    Relevant Orders    US Non-ob Transvaginal    Nausea and vomiting, unspecified vomiting type        Relevant Medications    promethazine (PHENERGAN) 12.5 MG tablet    Other Relevant Orders    HCG, B-subunit, Quantitative            Threatened AB  ED labs, notes, and imaging reviewed.   LMP 2024, GA 7w1d.   Consulted with MEGHNA.  Repeat U/S in 1 week(s).  Call for " yousif bleeding, increased abdominal pain, or fever.  Options discussed with patient, if miscarriage.  Report to ED if saturating a pad greater than every 30-60 mins and/or severe abdominal pain.   Advised to increase water intake to 80oz per day. Also drink no sugar electrolyte drinks.   Phenergan prescribed. Risks reviewed.   HCG STAT ordered to ensure rising appropriately. Informed expected rise 33% within 48 hrs. HCG today not >/=48 hrs.   Return in about 1 week (around 8/19/2024) for F/U TAB, Ultrasound, [place].    Counseling was given to patient and family for the following topics: importance of treatment compliance . Total time of the encounter was 30 minutes and 30 minutes was spend counseling.      Lyndsey Aguirre, APRN  08/12/2024

## 2024-08-12 NOTE — TELEPHONE ENCOUNTER
DEREK THORNTON    [phone]    PT WAS SEEN IN ER YESTERDAY (NOTES IN CHART) DX Syncope and collapse    PT WANTS TO BE SEEN TODAY.

## 2024-08-15 ENCOUNTER — TELEPHONE (OUTPATIENT)
Dept: OBSTETRICS AND GYNECOLOGY | Facility: CLINIC | Age: 39
End: 2024-08-15

## 2024-08-15 ENCOUNTER — INITIAL PRENATAL (OUTPATIENT)
Dept: OBSTETRICS AND GYNECOLOGY | Facility: CLINIC | Age: 39
End: 2024-08-15
Payer: COMMERCIAL

## 2024-08-15 VITALS — DIASTOLIC BLOOD PRESSURE: 70 MMHG | WEIGHT: 180 LBS | BODY MASS INDEX: 31.89 KG/M2 | SYSTOLIC BLOOD PRESSURE: 110 MMHG

## 2024-08-15 DIAGNOSIS — R11.2 NAUSEA AND VOMITING, UNSPECIFIED VOMITING TYPE: ICD-10-CM

## 2024-08-15 DIAGNOSIS — O09.529 ANTEPARTUM MULTIGRAVIDA OF ADVANCED MATERNAL AGE: ICD-10-CM

## 2024-08-15 DIAGNOSIS — O99.210 OBESITY IN PREGNANCY, ANTEPARTUM: ICD-10-CM

## 2024-08-15 DIAGNOSIS — Z98.890 HISTORY OF GASTRIC SURGERY: ICD-10-CM

## 2024-08-15 DIAGNOSIS — Z34.90 PRENATAL CARE, ANTEPARTUM: Primary | ICD-10-CM

## 2024-08-15 PROBLEM — R53.83 FATIGUE: Status: RESOLVED | Noted: 2023-07-13 | Resolved: 2024-08-15

## 2024-08-15 PROBLEM — R06.09 DYSPNEA ON EXERTION: Status: RESOLVED | Noted: 2023-07-13 | Resolved: 2024-08-15

## 2024-08-15 PROBLEM — R03.0 ELEVATED BLOOD PRESSURE READING: Status: RESOLVED | Noted: 2023-11-08 | Resolved: 2024-08-15

## 2024-08-15 PROBLEM — R10.13 DYSPEPSIA: Status: RESOLVED | Noted: 2023-07-13 | Resolved: 2024-08-15

## 2024-08-15 PROBLEM — E66.01 MORBID OBESITY WITH BMI OF 40.0-44.9, ADULT: Status: RESOLVED | Noted: 2021-10-11 | Resolved: 2024-08-15

## 2024-08-15 LAB
QT INTERVAL: 368 MS
QT INTERVAL: 418 MS
QTC INTERVAL: 427 MS
QTC INTERVAL: 434 MS

## 2024-08-15 RX ORDER — PROMETHAZINE HYDROCHLORIDE 12.5 MG/1
12.5 TABLET ORAL EVERY 6 HOURS PRN
Qty: 20 TABLET | Refills: 0 | Status: SHIPPED | OUTPATIENT
Start: 2024-08-15

## 2024-08-15 NOTE — LETTER
August 15, 2024     Patient: Brenda Trejo   YOB: 1985   Date of Visit: 8/15/2024       To Whom It May Concern:    It is my medical opinion that Ms. Trejo should remain out of participation until after pregnancy is completed.    If you have any questions or concerns, please don't hesitate to call.         Sincerely,        Tiana Gonzáles MD        CC: No Recipients

## 2024-08-15 NOTE — PROGRESS NOTES
Initial ob visit     CC- Here for care of pregnancy        Brenda Trejo is a 39 y.o. female, , who presents for her first obstetrical visit.  Patient's last menstrual period was 2024 (exact date).. Her LISHA is 2025, by Ultrasound. Current GA is 7w2d.     Initial positive test date : 24, UPT        Her periods are every 28 days.  Prior obstetric issues: none  Patient's past medical history is significant for:  h/o gastric sleeve and lumbar discectomy  .  Family history of genetic issues (includes FOB): none  Prior infections concerning in pregnancy (Rash, fever in last 2 weeks): No  Varicella Hx - history of chicken pox  Prior testing for Cystic Fibrosis Carrier or Sickle Cell Trait- never  Prepregnancy BMI - Body mass index is 31.89 kg/m².  History of STD: no  Hx of HSV for patient or partner: no  US done today: Yes.  Findings showed single viable IUP with FHR of 167bpm measuring 8w0d.  I have personally evaluated the U/S and agree with the findings. Tiana Gonzáles MD      OB History    Para Term  AB Living   1 0 0 0 0 0   SAB IAB Ectopic Molar Multiple Live Births   0 0 0 0 0 0      # Outcome Date GA Lbr Geovany/2nd Weight Sex Type Anes PTL Lv   1 Current                Additional Pertinent History   Last Pap : 10/25/21 Result: negative HPV: negative     Last Completed Pap Smear            PAP SMEAR (Every 3 Years) Next due on 10/25/2024      10/25/2021  SCANNED - PAP SMEAR    2020  Done - negative                  History of abnormal Pap smear: yes - unknown date, no previous LEEP  Family history of uterine, colon, breast, or ovarian cancer: yes - breast cancer in her mother and maternal aunt, colon cancer in her mother as well.   Feelings of Anxiety or Depression: no  Tobacco Usage?: No   Alcohol/Drug Use?: Not since + UPT  Over the age of 35 at delivery: yes  Genetic Screening: desires cell free DNA      PMH    Current Outpatient Medications:     promethazine  (PHENERGAN) 12.5 MG tablet, Take 1 tablet by mouth Every 6 (Six) Hours As Needed for Nausea or Vomiting., Disp: 20 tablet, Rfl: 0    Calcium 250 MG capsule, Take  by mouth., Disp: , Rfl:     Cholecalciferol 1.25 MG (29971 UT) tablet, Take 1 tablet by mouth 1 (One) Time Per Week., Disp: 12 tablet, Rfl: 0    Dupixent 300 MG/2ML solution pen-injector, Every 14 (Fourteen) Days., Disp: , Rfl:     multivitamin with minerals tablet tablet, Take 1 tablet by mouth Daily., Disp: , Rfl:     Omega-3 Fatty Acids (fish oil) 1000 MG capsule capsule, Take  by mouth Daily With Breakfast., Disp: , Rfl:     Prenatal Vit-Fe Fumarate-FA (prenatal vitamin 27-0.8) 27-0.8 MG tablet tablet, Take  by mouth Daily., Disp: , Rfl:     thiamine (VITAMIN B-1) 100 MG tablet  tablet, Take 1 tablet by mouth Daily., Disp: , Rfl:     vitamin B-12 (CYANOCOBALAMIN) 500 MCG tablet, Take 1 tablet by mouth Daily., Disp: , Rfl:     vitamin B-6 (PYRIDOXINE) 25 MG tablet, Take 1 tablet by mouth Daily for 30 days., Disp: 30 tablet, Rfl: 0    Zinc 50 MG tablet, Take  by mouth., Disp: , Rfl:      Past Medical History:   Diagnosis Date    Abnormal Pap smear of cervix     Allergic rhinitis 2012    Anxiety     Asthma     controlled    C. difficile diarrhea     10/2021    Chronic back pain 2010    r/t trauma, failed back surgery x 2, denies NSAIDS/steroids/narcotics    Claustrophobia     Depression     Dyspepsia     Dyspnea on exertion     Eczema     on Dupixent    Fatigue     Gout     Migraine     Morbid obesity     Urinary tract infection         Past Surgical History:   Procedure Laterality Date    BREAST BIOPSY  10/2019    CARPAL TUNNEL RELEASE Bilateral 2021    COLONOSCOPY  2021    GASTRIC SLEEVE LAPAROSCOPIC N/A 11/07/2023    Procedure: GASTRIC SLEEVE LAPAROSCOPIC WITH [employer] ROBOT WITH ESOPHAGOGASTRODUODENOSCOPY;  Surgeon: Ashley Leong MD;  Location: [facility];  Service: Robotics - DGTSi;  Laterality: N/A;    LUMBAR DISCECTOMY Left 12/23/2021     Procedure: LUMBAR MICRODISCECTOMY L5-S1;  Surgeon: James Estrada MD;  Location: [facility];  Service: Neurosurgery;  Laterality: Left;    MOUTH SURGERY      Gum Grafts    TONSILLECTOMY AND ADENOIDECTOMY      WISDOM TOOTH EXTRACTION         Review of Systems   Review of Systems    Patient Reports:  nausea and vomiting at least 1 time per day. She was given promethazine. States this has helped  Patient Denies: vaginal bleeding  All systems reviewed and otherwise normal.    I have reviewed and agree with the HPI, ROS, and historical information as entered above. Tiana Gonzáles MD      /70   Wt 81.6 kg (180 lb)   LMP 2024 (Exact Date)   BMI 31.89 kg/m²     The additional following portions of the patient's history were reviewed and updated as appropriate: allergies, current medications, past family history, past medical history, past social history, past surgical history, and problem list.    Physical Exam  General:  well developed; well nourished  no acute distress   Chest/Respiratory: No labored breathing, normal respiratory effort, normal appearance, no respiratory noises noted   Heart:  normal rate, regular rhythm,  no murmurs, rubs, or gallops   Thyroid: normal to inspection and palpation   Breasts:  Not performed.   Abdomen: soft, non-tender; no masses  no umbilical or inguinal hernias are present  no hepato-splenomegaly   Pelvis: Not performed.        Assessment and Plan    Problem List Items Addressed This Visit          Gastrointestinal Abdominal     History of gastric surgery    Overview     History of gastric sleeve in             Gravid and     AMA (advanced maternal age) multigravida 35+    Prenatal care - Primary    Relevant Orders    Obstetric Panel    HIV-1 / O / 2 Ag / Antibody    Urine Culture - Urine, Urine, Clean Catch    Urinalysis With Microscopic - Urine, Clean Catch    Hemoglobin A1c    Chlamydia trachomatis, Neisseria gonorrhoeae, PCR -  Urine, Urine, Clean Catch    Obesity in pregnancy, antepartum    Overview     Hgb A1C at NOB  Early 1hr gtt at 2nd visit  Discussed carbohydrate control  Recommend limiting weight gain to 15-20lbs  Recommend baby asa weeks 12-delivery  Growth US at 32 and 37 weeks           Relevant Orders    Hemoglobin A1c     Other Visit Diagnoses       Nausea and vomiting, unspecified vomiting type        Relevant Medications    promethazine (PHENERGAN) 12.5 MG tablet            Pregnancy at 7w2d  Reviewed routine prenatal care with the office and educational materials given  Lab(s) Ordered  Discussed options for genetic testing including first trimester nuchal translucency screen, genetic disease carrier testing, quadruple screen, and NIPT  Patient is on Prenatal vitamins  Activity recommendation : 150 minutes/week of moderate intensity aerobic activity unless we limit for bleeding, hypertension or other pregnancy complication   Discussed carbohydrate control.   hgb A1C today  Recommended early 1 hr gtt next visit  discussed baby aspirin from 12 weeks to delivery for prevention of preeclampsia   Return in about 4 weeks (around 9/12/2024) for glucola.      Tiana Gonzáles MD  08/15/2024

## 2024-08-15 NOTE — TELEPHONE ENCOUNTER
Sent note to patients mychart releasing her from boxing while pregnant. Called patient and let her know VU.

## 2024-08-15 NOTE — TELEPHONE ENCOUNTER
Caller: Brenda Trejo    Relationship: Self    Best call back number: [phone] (home)       What form or medical record are you requesting: DR'S NOTE    Who is requesting this form or medical record from you: [employer]    How would you like to receive the form or medical records (pick-up, mail, fax): MAIL    If mail, what is the address: [address] [place] [address]       Timeframe paperwork needed: ASAP    Additional notes: PT CALLED IN STATING THAT SHE NEEDS A DR'S NOTE STATING THAT SHE CANNOT BOX - SHE HAS A MEMBERSHIP TO A [employer] AND THEY WILL NOT CANCEL IT WITHOUT A DR'S NOTE. OK TO RESPOND THROUGH [employer], OK TO LVM.

## 2024-08-16 LAB
ABO GROUP BLD: NORMAL
APPEARANCE UR: CLEAR
BACTERIA #/AREA URNS HPF: NORMAL /HPF
BASOPHILS # BLD AUTO: 0 X10E3/UL (ref 0–0.2)
BASOPHILS NFR BLD AUTO: 0 %
BILIRUB UR QL STRIP: NEGATIVE
BLD GP AB SCN SERPL QL: NEGATIVE
CASTS URNS MICRO: NORMAL
COLOR UR: YELLOW
EOSINOPHIL # BLD AUTO: 0.1 X10E3/UL (ref 0–0.4)
EOSINOPHIL NFR BLD AUTO: 1 %
EPI CELLS #/AREA URNS HPF: NORMAL /HPF
ERYTHROCYTE [DISTWIDTH] IN BLOOD BY AUTOMATED COUNT: 12.7 % (ref 11.7–15.4)
GLUCOSE UR QL STRIP: NEGATIVE
HBA1C MFR BLD: 4.9 % (ref 4.8–5.6)
HBV SURFACE AG SERPL QL IA: NEGATIVE
HCT VFR BLD AUTO: 37.5 % (ref 34–46.6)
HCV IGG SERPL QL IA: NON REACTIVE
HGB BLD-MCNC: 12.2 G/DL (ref 11.1–15.9)
HGB UR QL STRIP: NEGATIVE
HIV 1+2 AB+HIV1 P24 AG SERPL QL IA: NON REACTIVE
IMM GRANULOCYTES # BLD AUTO: 0 X10E3/UL (ref 0–0.1)
IMM GRANULOCYTES NFR BLD AUTO: 0 %
KETONES UR QL STRIP: NEGATIVE
LEUKOCYTE ESTERASE UR QL STRIP: NEGATIVE
LYMPHOCYTES # BLD AUTO: 1.6 X10E3/UL (ref 0.7–3.1)
LYMPHOCYTES NFR BLD AUTO: 21 %
MCH RBC QN AUTO: 30.6 PG (ref 26.6–33)
MCHC RBC AUTO-ENTMCNC: 32.5 G/DL (ref 31.5–35.7)
MCV RBC AUTO: 94 FL (ref 79–97)
MONOCYTES # BLD AUTO: 0.3 X10E3/UL (ref 0.1–0.9)
MONOCYTES NFR BLD AUTO: 5 %
NEUTROPHILS # BLD AUTO: 5.4 X10E3/UL (ref 1.4–7)
NEUTROPHILS NFR BLD AUTO: 73 %
NITRITE UR QL STRIP: NEGATIVE
PH UR STRIP: 6.5 [PH] (ref 5–8)
PLATELET # BLD AUTO: 243 X10E3/UL (ref 150–450)
PROT UR QL STRIP: NEGATIVE
RBC # BLD AUTO: 3.99 X10E6/UL (ref 3.77–5.28)
RBC #/AREA URNS HPF: NORMAL /HPF
RH BLD: POSITIVE
RPR SER QL: NON REACTIVE
RUBV IGG SERPL IA-ACNC: 1.86 INDEX
SP GR UR STRIP: 1.01 (ref 1–1.03)
UROBILINOGEN UR STRIP-MCNC: NORMAL MG/DL
WBC # BLD AUTO: 7.4 X10E3/UL (ref 3.4–10.8)
WBC #/AREA URNS HPF: NORMAL /HPF

## 2024-08-17 LAB
BACTERIA UR CULT: NO GROWTH
BACTERIA UR CULT: NORMAL
C TRACH RRNA SPEC QL NAA+PROBE: NEGATIVE
N GONORRHOEA RRNA SPEC QL NAA+PROBE: NEGATIVE

## 2024-08-22 ENCOUNTER — LAB (OUTPATIENT)
Dept: OBSTETRICS AND GYNECOLOGY | Facility: CLINIC | Age: 39
End: 2024-08-22
Payer: COMMERCIAL

## 2024-08-22 DIAGNOSIS — Z34.90 PRENATAL CARE, ANTEPARTUM: Primary | ICD-10-CM

## 2024-08-24 ENCOUNTER — TELEPHONE (OUTPATIENT)
Dept: OBSTETRICS AND GYNECOLOGY | Facility: CLINIC | Age: 39
End: 2024-08-24
Payer: COMMERCIAL

## 2024-08-24 NOTE — TELEPHONE ENCOUNTER
[facility] received a fax that requires your attention. The document has been indexed to the patient’s chart for your review.      Reason for sending: EXTERNAL MEDICAL RECORD NOTIFICATION     Documents Description: BREAST PUMP ORDER-[employer]-8.21.24    Name of Sender: [employer]     Date Indexed: 8.21.24

## 2024-08-30 LAB — HCG INTACT+B SERPL-ACNC: NORMAL MIU/ML

## 2024-09-12 ENCOUNTER — ROUTINE PRENATAL (OUTPATIENT)
Dept: OBSTETRICS AND GYNECOLOGY | Facility: CLINIC | Age: 39
End: 2024-09-12
Payer: COMMERCIAL

## 2024-09-12 VITALS — SYSTOLIC BLOOD PRESSURE: 118 MMHG | DIASTOLIC BLOOD PRESSURE: 64 MMHG | BODY MASS INDEX: 32.84 KG/M2 | WEIGHT: 185.4 LBS

## 2024-09-12 DIAGNOSIS — Z34.90 PRENATAL CARE, ANTEPARTUM: Primary | ICD-10-CM

## 2024-09-12 DIAGNOSIS — O99.210 OBESITY IN PREGNANCY, ANTEPARTUM: ICD-10-CM

## 2024-09-12 DIAGNOSIS — O09.521 MULTIGRAVIDA OF ADVANCED MATERNAL AGE IN FIRST TRIMESTER: ICD-10-CM

## 2024-09-12 LAB
GLUCOSE UR STRIP-MCNC: NEGATIVE MG/DL
PROT UR STRIP-MCNC: NEGATIVE MG/DL

## 2024-09-12 NOTE — PROGRESS NOTES
OB FOLLOW UP  CC- Here for care of pregnancy        Brenda Trejo is a 39 y.o.  12w0d patient being seen today for her obstetrical follow up visit. Patient reports some vomiting. She states she eats every 2 hours and that has helped significantly. She reports 2/7 days per week she will have days when she struggles to hold anything down. She states overall the vomiting has improved over the last month.     Early gtt testing due at 9:15AM.    Her prenatal care is complicated by (and status) :   Patient Active Problem List   Diagnosis    Family history of breast cancer in mother    Lumbar herniated disc    Eczema    Chronic back pain    Anxiety    Asthma    AMA (advanced maternal age) multigravida 35+    Prenatal care    Obesity in pregnancy, antepartum    History of gastric surgery       Genetic testing?: already completed and was normal.  NOB labs reviewed  Ultrasound Today: No    ROS -   Patient Denies: leaking of fluid, vaginal bleeding, dysuria, excessive vomiting, and more than 6 contractions per hour  All other systems reviewed and are negative.     The additional following portions of the patient's history were reviewed and updated as appropriate: allergies, current medications, past family history, past medical history, past social history, past surgical history, and problem list.    I have reviewed and agree with the HPI, ROS, and historical information as entered above. Tiana Gonzáles MD          /64   Wt 84.1 kg (185 lb 6.4 oz)   LMP 2024 (Exact Date)   BMI 32.84 kg/m²         EXAM:     Prenatal Vitals  BP: 118/64  Weight: 84.1 kg (185 lb 6.4 oz)   Fetal Heart Rate: 163          Urine Glucose Read-only: Negative  Urine Protein Read-only: Negative       Assessment and Plan    Problem List Items Addressed This Visit          Gravid and     AMA (advanced maternal age) multigravida 35+    Overview     cfDNA negative.  Boy!         Prenatal care - Primary     Relevant Orders    POC Urinalysis Dipstick (Completed)    Obesity in pregnancy, antepartum    Overview     Hgb A1C at NOB 4.9  Early 1hr gtt at 2nd visit  Discussed carbohydrate control  Recommend limiting weight gain to 15-20lbs  Recommend baby asa weeks 12-delivery  Growth US at 32 and 37 weeks              Pregnancy at 12w0d  Labs reviewed from New OB Visit.  Counseled on genetic testing, carrier status and option for NT screen  Activity and Exercise discussed.  Patient is on Prenatal vitamins  Cell free DNA testing completed and within normal limits.  Glucola testing today.  Return in about 4 weeks (around 10/10/2024).    Tiana Gonzáles MD  09/12/2024

## 2024-09-13 LAB — GLUCOSE 1H P 50 G GLC PO SERPL-MCNC: 100 MG/DL (ref 65–139)

## 2024-09-24 ENCOUNTER — HOSPITAL ENCOUNTER (EMERGENCY)
Facility: HOSPITAL | Age: 39
Discharge: HOME | End: 2024-09-24
Attending: EMERGENCY MEDICINE | Admitting: EMERGENCY MEDICINE
Payer: COMMERCIAL

## 2024-09-24 VITALS
DIASTOLIC BLOOD PRESSURE: 81 MMHG | SYSTOLIC BLOOD PRESSURE: 126 MMHG | OXYGEN SATURATION: 96 % | HEIGHT: 63 IN | WEIGHT: 183 LBS | TEMPERATURE: 98.9 F | BODY MASS INDEX: 32.43 KG/M2 | RESPIRATION RATE: 18 BRPM | HEART RATE: 77 BPM

## 2024-09-24 DIAGNOSIS — K29.00 ACUTE SUPERFICIAL GASTRITIS WITHOUT HEMORRHAGE: Primary | ICD-10-CM

## 2024-09-24 DIAGNOSIS — O99.619 GASTROESOPHAGEAL REFLUX IN PREGNANCY: ICD-10-CM

## 2024-09-24 DIAGNOSIS — Z34.92 PREGNANT AND NOT YET DELIVERED IN SECOND TRIMESTER: ICD-10-CM

## 2024-09-24 DIAGNOSIS — K21.9 GASTROESOPHAGEAL REFLUX IN PREGNANCY: ICD-10-CM

## 2024-09-24 LAB
ALBUMIN SERPL-MCNC: 3.9 G/DL (ref 3.5–5.2)
ALBUMIN/GLOB SERPL: 1.6 G/DL
ALP SERPL-CCNC: 62 U/L (ref 39–117)
ALT SERPL W P-5'-P-CCNC: 12 U/L (ref 1–33)
ANION GAP SERPL CALCULATED.3IONS-SCNC: 13 MMOL/L (ref 5–15)
AST SERPL-CCNC: 19 U/L (ref 1–32)
BASOPHILS # BLD AUTO: 0.03 10*3/MM3 (ref 0–0.2)
BASOPHILS NFR BLD AUTO: 0.4 % (ref 0–1.5)
BILIRUB SERPL-MCNC: <0.2 MG/DL (ref 0–1.2)
BUN SERPL-MCNC: 16 MG/DL (ref 6–20)
BUN/CREAT SERPL: 25.8 (ref 7–25)
CALCIUM SPEC-SCNC: 8.6 MG/DL (ref 8.6–10.5)
CHLORIDE SERPL-SCNC: 102 MMOL/L (ref 98–107)
CO2 SERPL-SCNC: 22 MMOL/L (ref 22–29)
CREAT SERPL-MCNC: 0.62 MG/DL (ref 0.57–1)
DEPRECATED RDW RBC AUTO: 43.1 FL (ref 37–54)
EGFRCR SERPLBLD CKD-EPI 2021: 116.3 ML/MIN/1.73
EOSINOPHIL # BLD AUTO: 0.24 10*3/MM3 (ref 0–0.4)
EOSINOPHIL NFR BLD AUTO: 2.8 % (ref 0.3–6.2)
ERYTHROCYTE [DISTWIDTH] IN BLOOD BY AUTOMATED COUNT: 13.1 % (ref 12.3–15.4)
GLOBULIN UR ELPH-MCNC: 2.4 GM/DL
GLUCOSE SERPL-MCNC: 120 MG/DL (ref 65–99)
HCT VFR BLD AUTO: 33.4 % (ref 34–46.6)
HGB BLD-MCNC: 11.4 G/DL (ref 12–15.9)
IMM GRANULOCYTES # BLD AUTO: 0.02 10*3/MM3 (ref 0–0.05)
IMM GRANULOCYTES NFR BLD AUTO: 0.2 % (ref 0–0.5)
LIPASE SERPL-CCNC: 58 U/L (ref 13–60)
LYMPHOCYTES # BLD AUTO: 2.29 10*3/MM3 (ref 0.7–3.1)
LYMPHOCYTES NFR BLD AUTO: 26.8 % (ref 19.6–45.3)
MCH RBC QN AUTO: 30.6 PG (ref 26.6–33)
MCHC RBC AUTO-ENTMCNC: 34.1 G/DL (ref 31.5–35.7)
MCV RBC AUTO: 89.8 FL (ref 79–97)
MONOCYTES # BLD AUTO: 0.34 10*3/MM3 (ref 0.1–0.9)
MONOCYTES NFR BLD AUTO: 4 % (ref 5–12)
NEUTROPHILS NFR BLD AUTO: 5.63 10*3/MM3 (ref 1.7–7)
NEUTROPHILS NFR BLD AUTO: 65.8 % (ref 42.7–76)
NRBC BLD AUTO-RTO: 0 /100 WBC (ref 0–0.2)
PLATELET # BLD AUTO: 216 10*3/MM3 (ref 140–450)
PMV BLD AUTO: 8.9 FL (ref 6–12)
POTASSIUM SERPL-SCNC: 4 MMOL/L (ref 3.5–5.2)
PROT SERPL-MCNC: 6.3 G/DL (ref 6–8.5)
RBC # BLD AUTO: 3.72 10*6/MM3 (ref 3.77–5.28)
SODIUM SERPL-SCNC: 137 MMOL/L (ref 136–145)
WBC NRBC COR # BLD AUTO: 8.55 10*3/MM3 (ref 3.4–10.8)

## 2024-09-24 PROCEDURE — 99283 EMERGENCY DEPT VISIT LOW MDM: CPT

## 2024-09-24 PROCEDURE — 80053 COMPREHEN METABOLIC PANEL: CPT | Performed by: EMERGENCY MEDICINE

## 2024-09-24 PROCEDURE — 93005 ELECTROCARDIOGRAM TRACING: CPT | Performed by: EMERGENCY MEDICINE

## 2024-09-24 PROCEDURE — 85025 COMPLETE CBC W/AUTO DIFF WBC: CPT | Performed by: EMERGENCY MEDICINE

## 2024-09-24 PROCEDURE — 83690 ASSAY OF LIPASE: CPT | Performed by: EMERGENCY MEDICINE

## 2024-09-24 PROCEDURE — 96374 THER/PROPH/DIAG INJ IV PUSH: CPT

## 2024-09-24 RX ORDER — FAMOTIDINE 10 MG/ML
40 INJECTION, SOLUTION INTRAVENOUS ONCE
Status: COMPLETED | OUTPATIENT
Start: 2024-09-24 | End: 2024-09-24

## 2024-09-24 RX ORDER — FAMOTIDINE 20 MG/1
20 TABLET, FILM COATED ORAL 2 TIMES DAILY
Qty: 20 TABLET | Refills: 0 | Status: SHIPPED | OUTPATIENT
Start: 2024-09-24

## 2024-09-24 RX ORDER — ALUMINA, MAGNESIA, AND SIMETHICONE 2400; 2400; 240 MG/30ML; MG/30ML; MG/30ML
15 SUSPENSION ORAL ONCE
Status: COMPLETED | OUTPATIENT
Start: 2024-09-24 | End: 2024-09-24

## 2024-09-24 RX ORDER — SUCRALFATE 1 G/1
1 TABLET ORAL
Qty: 60 TABLET | Refills: 0 | Status: SHIPPED | OUTPATIENT
Start: 2024-09-24

## 2024-09-24 RX ORDER — SODIUM CHLORIDE 0.9 % (FLUSH) 0.9 %
10 SYRINGE (ML) INJECTION AS NEEDED
Status: DISCONTINUED | OUTPATIENT
Start: 2024-09-24 | End: 2024-09-24 | Stop reason: HOSPADM

## 2024-09-24 RX ADMIN — ALUMINUM HYDROXIDE, MAGNESIUM HYDROXIDE, DIMETHICONE 15 ML: 400; 400; 40 SUSPENSION ORAL at 20:10

## 2024-09-24 RX ADMIN — FAMOTIDINE 40 MG: 10 INJECTION, SOLUTION INTRAVENOUS at 20:10

## 2024-09-26 LAB
QT INTERVAL: 372 MS
QTC INTERVAL: 418 MS

## 2024-10-10 ENCOUNTER — TELEPHONE (OUTPATIENT)
Dept: OBSTETRICS AND GYNECOLOGY | Facility: CLINIC | Age: 39
End: 2024-10-10
Payer: COMMERCIAL

## 2024-10-10 ENCOUNTER — ROUTINE PRENATAL (OUTPATIENT)
Dept: OBSTETRICS AND GYNECOLOGY | Facility: CLINIC | Age: 39
End: 2024-10-10
Payer: COMMERCIAL

## 2024-10-10 VITALS — BODY MASS INDEX: 33.89 KG/M2 | DIASTOLIC BLOOD PRESSURE: 68 MMHG | SYSTOLIC BLOOD PRESSURE: 104 MMHG | WEIGHT: 191.3 LBS

## 2024-10-10 DIAGNOSIS — Z34.90 PRENATAL CARE, ANTEPARTUM: ICD-10-CM

## 2024-10-10 DIAGNOSIS — O09.512 PRIMIGRAVIDA OF ADVANCED MATERNAL AGE IN SECOND TRIMESTER: Primary | ICD-10-CM

## 2024-10-10 LAB
GLUCOSE UR STRIP-MCNC: NEGATIVE MG/DL
PROT UR STRIP-MCNC: NEGATIVE MG/DL

## 2024-10-10 NOTE — PROGRESS NOTES
OB FOLLOW UP  CC- Here for care of pregnancy        Brenda Trejo is a 39 y.o.  16w0d patient being seen today for her obstetrical follow up visit. Patient reports no complaints.    On 24, she went to ER at  for abdominal pressure that made her feel short of breath. She reported waking up in the night with sour taste in her mouth. She was diagnosed with gastritis from GERD and given Famotidine and Carafate. She reports she has felt fine since this.     Her prenatal care is complicated by (and status) : see below.  Patient Active Problem List   Diagnosis    Family history of breast cancer in mother    Lumbar herniated disc    Eczema    Chronic back pain    Anxiety    Asthma    AMA (advanced maternal age) multigravida 35+    Prenatal care    Obesity in pregnancy, antepartum    History of gastric surgery       Flu Status: Already given in current flu season  Ultrasound Today: No    AFP: is undecided about, wants to think about it.    ROS -   Patient Denies: leaking of fluid, vaginal bleeding, dysuria, excessive vomiting, and more than 6 contractions per hour  All other systems reviewed and are negative.       The additional following portions of the patient's history were reviewed and updated as appropriate: allergies, current medications, past family history, past medical history, past social history, past surgical history, and problem list.      I have reviewed and agree with the HPI, ROS, and historical information as entered above. CARLTON Ruggiero          EXAM:     Prenatal Vitals  BP: 104/68  Weight: 86.8 kg (191 lb 4.8 oz)   Fetal Heart Rate: 145         Urine Glucose Read-only: Negative  Urine Protein Read-only: Negative           Assessment and Plan    Problem List Items Addressed This Visit       Prenatal care     Other Visit Diagnoses       Primigravida of advanced maternal age in second trimester    -  Primary    Relevant Orders    POC Urinalysis Dipstick (Completed)    [facility]            Pregnancy at 16w0d  Fetal status reassuring.   Counseled on MSAFP alone in relation to OTD and placental issues. She would like to think about it and will let us know at her next visit.    Anatomy scan next visit with [facility].  Activity and Exercise discussed.  Patient is on Prenatal vitamins  Return in about 4 weeks (around 2024) for ultrasound, anatomy with [facility]- Eliecer RAMOS.    Jodie Welsh, APRN  10/10/2024

## 2024-10-10 NOTE — TELEPHONE ENCOUNTER
Caller: Brenda Trejo    Relationship to patient: Self    Best call back number: [phone] / LVM    Chief complaint: WANTS U/S IN OFFICE INSTEAD @ [facility]    Type of visit: OB U/S     Requested date: 11/07/24    If rescheduling, when is the original appointment: NO    Additional notes: PT HAS U/S W. [facility] ON 11/07/24 @ 10:30am AND OB F/U @ 1:20pm W/ DR DEAN - PT CALLED [facility] TO SEE IF THEY HAD ANY OPENINGS AROUND 1PM ON 11/07/24 - THEY DO NOT - PT WANTS TO KNOW IF SHE CAN JUST HAVE THE U/S DONE IN OFFICE INSTEAD OF [facility] - HUB SPOKE W/ STEPHANIE @ [facility]  OFFICE AND WAS TOLD TO SEND A TE    PLEASE CALL THE PT TO DISCUSS    THANK YOU!

## 2024-10-15 ENCOUNTER — TELEPHONE (OUTPATIENT)
Dept: OBSTETRICS AND GYNECOLOGY | Facility: CLINIC | Age: 39
End: 2024-10-15
Payer: COMMERCIAL

## 2024-10-15 DIAGNOSIS — R11.2 NAUSEA AND VOMITING, UNSPECIFIED VOMITING TYPE: Primary | ICD-10-CM

## 2024-10-15 RX ORDER — ONDANSETRON 4 MG/1
4 TABLET, ORALLY DISINTEGRATING ORAL EVERY 8 HOURS PRN
Qty: 30 TABLET | Refills: 1 | Status: SHIPPED | OUTPATIENT
Start: 2024-10-15

## 2024-10-15 NOTE — TELEPHONE ENCOUNTER
Patient of Dr. Gonzáles; G1 @ 16w 5d. LOV 10/10/24.  Returned patient's call.   States she has not been able to keep anything down since Sunday and thinks she is dehydrated. She felt dizzy this morning; it improved when she laid down.   Reports decreased urine output that is dark yellow in color.   Reports emesis 3-4 times per day. States she vomits each time she eats and is not sure if she is retaining any or not. States she does retain fluids.   Denies any diarrhea or other symptoms.   Discussed with CARLTON Das. She will send RX for Zofran ODT to patient's pharmacy. Patient can try to hydrate after taking the Zofran; if still unable to retain anything or if she feels symptomatic enough now, she should go to the ER.   Informed patient. Encouraged hydration. Constipation precautions given. She v/u and agreed.

## 2024-10-15 NOTE — TELEPHONE ENCOUNTER
PT is calling because she is unable to keep anything down since Sunday and today got  really dizzy and nauseous and felt she was going to pass out- her vision went black.  PT states the nausea was getting better and now she is unable to keep anything down unable to keep water down at all since Sunday.

## 2024-10-24 ENCOUNTER — ROUTINE PRENATAL (OUTPATIENT)
Dept: OBSTETRICS AND GYNECOLOGY | Facility: CLINIC | Age: 39
End: 2024-10-24
Payer: COMMERCIAL

## 2024-10-24 VITALS — WEIGHT: 194.8 LBS | DIASTOLIC BLOOD PRESSURE: 68 MMHG | SYSTOLIC BLOOD PRESSURE: 108 MMHG | BODY MASS INDEX: 34.51 KG/M2

## 2024-10-24 DIAGNOSIS — O09.529 ANTEPARTUM MULTIGRAVIDA OF ADVANCED MATERNAL AGE: ICD-10-CM

## 2024-10-24 DIAGNOSIS — O26.899 ABDOMINAL PAIN AFFECTING PREGNANCY: ICD-10-CM

## 2024-10-24 DIAGNOSIS — R10.9 ABDOMINAL PAIN AFFECTING PREGNANCY: ICD-10-CM

## 2024-10-24 DIAGNOSIS — Z34.90 PRENATAL CARE, ANTEPARTUM: Primary | ICD-10-CM

## 2024-10-24 DIAGNOSIS — O99.210 OBESITY IN PREGNANCY, ANTEPARTUM: ICD-10-CM

## 2024-10-24 LAB
BILIRUB BLD-MCNC: NEGATIVE MG/DL
GLUCOSE UR STRIP-MCNC: NEGATIVE MG/DL
KETONES UR QL: NEGATIVE
LEUKOCYTE EST, POC: NEGATIVE
NITRITE UR-MCNC: NEGATIVE MG/ML
PH UR: 6 [PH] (ref 5–8)
PROT UR STRIP-MCNC: NEGATIVE MG/DL
RBC # UR STRIP: NEGATIVE /UL
SP GR UR: 1.01 (ref 1–1.03)
UROBILINOGEN UR QL: NORMAL

## 2024-10-24 NOTE — PROGRESS NOTES
OB FOLLOW UP  CC- Here for care of pregnancy        Brenda Trejo is a 39 y.o.  18w0d patient being seen today for frequent stabbing pain in lower middle abdomen that started yesterday. Denies bleeding and constipation. Report staying well hydrated.     Her prenatal care is complicated by (and status) :    Patient Active Problem List   Diagnosis    Family history of breast cancer in mother    Lumbar herniated disc    Eczema    Chronic back pain    Anxiety    Asthma    AMA (advanced maternal age) multigravida 35+    Prenatal care    Obesity in pregnancy, antepartum    History of gastric surgery    Primigravida of advanced maternal age in second trimester       Ultrasound Today: yes. Placenta is posterior with previa. CL normal. AF normal. .    ROS -   Patient Reports : see above    All other systems reviewed and are negative.     The additional following portions of the patient's history were reviewed and updated as appropriate: allergies, current medications, past family history, past medical history, past social history, past surgical history, and problem list.    I have reviewed and agree with the HPI, ROS, and historical information as entered above. Quique Eller, APRN    /68   Wt 88.4 kg (194 lb 12.8 oz)   LMP 2024 (Exact Date)   BMI 34.51 kg/m²     EXAM:     Prenatal Vitals  BP: 108/68  Weight: 88.4 kg (194 lb 12.8 oz)   Fetal Heart Rate: pos         Assessment and Plan    Problem List Items Addressed This Visit       AMA (advanced maternal age) multigravida 35+    Overview     cfDNA negative.  Boy!         Prenatal care - Primary    Relevant Orders    POC Urinalysis Dipstick (Completed)    Obesity in pregnancy, antepartum    Overview     Hgb A1C at NOB 4.9  Early 1hr gtt at 2nd visit 100  Discussed carbohydrate control  Recommend limiting weight gain to 15-20lbs  Recommend baby asa weeks 12-delivery  Growth US at 32 and 37 weeks            Other Visit Diagnoses        Abdominal pain affecting pregnancy        Relevant Orders    US Ob Limited 1 + Fetuses            Pregnancy at 18w0d  Fetal status reassuring. US reassuring  CCUA negative  Advise increased rest, hydration, warm baths, heating pad to back. Use stool softeners to prevent constipation if needed.  Return in about 2 weeks (around 11/7/2024) for after [facility] appt.    Quique Eller, APRN  10/24/2024

## 2024-11-07 ENCOUNTER — OFFICE VISIT (OUTPATIENT)
Dept: OBSTETRICS AND GYNECOLOGY | Facility: HOSPITAL | Age: 39
End: 2024-11-07
Payer: COMMERCIAL

## 2024-11-07 ENCOUNTER — ROUTINE PRENATAL (OUTPATIENT)
Dept: OBSTETRICS AND GYNECOLOGY | Facility: CLINIC | Age: 39
End: 2024-11-07
Payer: COMMERCIAL

## 2024-11-07 ENCOUNTER — HOSPITAL ENCOUNTER (OUTPATIENT)
Dept: WOMENS IMAGING | Facility: HOSPITAL | Age: 39
Discharge: HOME | End: 2024-11-07
Admitting: ADVANCED PRACTICE MIDWIFE
Payer: COMMERCIAL

## 2024-11-07 VITALS — WEIGHT: 192.2 LBS | SYSTOLIC BLOOD PRESSURE: 120 MMHG | BODY MASS INDEX: 34.05 KG/M2 | DIASTOLIC BLOOD PRESSURE: 64 MMHG

## 2024-11-07 VITALS — SYSTOLIC BLOOD PRESSURE: 127 MMHG | DIASTOLIC BLOOD PRESSURE: 70 MMHG | BODY MASS INDEX: 34.01 KG/M2 | WEIGHT: 192 LBS

## 2024-11-07 DIAGNOSIS — O09.512 PRIMIGRAVIDA OF ADVANCED MATERNAL AGE IN SECOND TRIMESTER: Primary | ICD-10-CM

## 2024-11-07 DIAGNOSIS — Z34.90 PRENATAL CARE, ANTEPARTUM: Primary | ICD-10-CM

## 2024-11-07 DIAGNOSIS — O44.00 PLACENTA PREVIA ANTEPARTUM: ICD-10-CM

## 2024-11-07 DIAGNOSIS — O99.210 OBESITY IN PREGNANCY, ANTEPARTUM: ICD-10-CM

## 2024-11-07 DIAGNOSIS — O09.512 PRIMIGRAVIDA OF ADVANCED MATERNAL AGE IN SECOND TRIMESTER: ICD-10-CM

## 2024-11-07 DIAGNOSIS — Z98.890 HISTORY OF GASTRIC SURGERY: ICD-10-CM

## 2024-11-07 DIAGNOSIS — O09.529 ANTEPARTUM MULTIGRAVIDA OF ADVANCED MATERNAL AGE: ICD-10-CM

## 2024-11-07 LAB
GLUCOSE UR STRIP-MCNC: NEGATIVE MG/DL
PROT UR STRIP-MCNC: NEGATIVE MG/DL

## 2024-11-07 PROCEDURE — 76811 OB US DETAILED SNGL FETUS: CPT

## 2024-11-07 RX ORDER — FERROUS SULFATE 325(65) MG
325 TABLET ORAL
COMMUNITY

## 2024-11-07 NOTE — PROGRESS NOTES
Patient denies any leaking fluid, bleeding, or contractions  NIPT negative  Patient states follow up with CARLTON Manley, is today.

## 2024-11-07 NOTE — PROGRESS NOTES
OB FOLLOW UP  CC- Here for care of pregnancy        Brenda Trejo is a 39 y.o.  20w0d patient being seen today for her obstetrical follow up visit. Patient reports occasional nausea and vomiting.     Her prenatal care is complicated by (and status) : see below.  Patient Active Problem List   Diagnosis    Family history of breast cancer in mother    Lumbar herniated disc    Eczema    Chronic back pain    Anxiety    Asthma    AMA (advanced maternal age) multigravida 35+    Prenatal care    Obesity in pregnancy, antepartum    History of gastric surgery    Primigravida of advanced maternal age in second trimester       Flu Status: Already given in current flu season  Ultrasound Today: Yes at [facility]. Report pending. Pt states everything looked good except placenta (low lying). F/u in 8 weeks.   AFP was declined.    ROS -     Patient Denies: leaking of fluid, vaginal bleeding, dysuria, excessive vomiting, and more than 6 contractions per hour  Fetal Movement : Yes  All other systems reviewed and are negative.       The additional following portions of the patient's history were reviewed and updated as appropriate: allergies and current medications.      I have reviewed and agree with the HPI, ROS, and historical information as entered above. Tiana Gonzáles MD      /64   Wt 87.2 kg (192 lb 3.2 oz)   LMP 2024 (Exact Date)   BMI 34.05 kg/m²       EXAM:     Prenatal Vitals  BP: 120/64  Weight: 87.2 kg (192 lb 3.2 oz)   Fetal Heart Rate: [facility]          Urine Glucose Read-only: Negative  Urine Protein Read-only: Negative       Assessment and Plan    Problem List Items Addressed This Visit          Gravid and     AMA (advanced maternal age) multigravida 35+    Overview     cfDNA negative.  Boy!         Prenatal care - Primary    Relevant Orders    POC Urinalysis Dipstick (Completed)    Obesity in pregnancy, antepartum    Overview     Hgb A1C at NOB 4.9  Early 1hr gtt at 2nd visit  100  Discussed carbohydrate control  Recommend limiting weight gain to 15-20lbs  Recommend baby asa weeks 12-delivery  Growth US at 32 and 37 weeks           Primigravida of advanced maternal age in second trimester    Overview     cfDNA negative            Pregnancy at 20w0d  Anatomy scan today is  with [facility].  Report now pending.  Fetal status reassuring.   Activity and Exercise discussed.  Patient is on Prenatal vitamins  Return in about 4 weeks (around 12/5/2024) for Please also schedule an appointment with Glucola on 1/2/2025 to match up with [facility].  .      Tiana Gonzáles MD  11/07/2024

## 2024-11-23 PROBLEM — O44.00 PLACENTA PREVIA ANTEPARTUM: Status: ACTIVE | Noted: 2024-11-23

## 2024-12-05 ENCOUNTER — ROUTINE PRENATAL (OUTPATIENT)
Dept: OBSTETRICS AND GYNECOLOGY | Facility: CLINIC | Age: 39
End: 2024-12-05
Payer: COMMERCIAL

## 2024-12-05 VITALS — DIASTOLIC BLOOD PRESSURE: 68 MMHG | SYSTOLIC BLOOD PRESSURE: 102 MMHG | WEIGHT: 199.3 LBS | BODY MASS INDEX: 35.3 KG/M2

## 2024-12-05 DIAGNOSIS — O99.210 OBESITY IN PREGNANCY, ANTEPARTUM: ICD-10-CM

## 2024-12-05 DIAGNOSIS — O09.512 PRIMIGRAVIDA OF ADVANCED MATERNAL AGE IN SECOND TRIMESTER: ICD-10-CM

## 2024-12-05 DIAGNOSIS — O09.529 ANTEPARTUM MULTIGRAVIDA OF ADVANCED MATERNAL AGE: ICD-10-CM

## 2024-12-05 DIAGNOSIS — O44.00 PLACENTA PREVIA ANTEPARTUM: ICD-10-CM

## 2024-12-05 DIAGNOSIS — Z34.90 PRENATAL CARE, ANTEPARTUM: Primary | ICD-10-CM

## 2024-12-05 DIAGNOSIS — Z98.890 HISTORY OF GASTRIC SURGERY: ICD-10-CM

## 2024-12-05 LAB
GLUCOSE UR STRIP-MCNC: NEGATIVE MG/DL
PROT UR STRIP-MCNC: NEGATIVE MG/DL

## 2024-12-05 RX ORDER — LANOLIN ALCOHOL/MO/W.PET/CERES
50 CREAM (GRAM) TOPICAL DAILY
COMMUNITY

## 2024-12-05 NOTE — PROGRESS NOTES
OB FOLLOW UP  CC- Here for care of pregnancy        Brenda Trejo is a 39 y.o.  24w0d patient being seen today for her obstetrical follow up visit. Patient reports occasional nausea and vomiting (especially after eating rice)..     [facility] appt next visit on 2025    Her prenatal care is complicated by (and status) : see below.  Patient Active Problem List   Diagnosis    Family history of breast cancer in mother    Lumbar herniated disc    Eczema    Chronic back pain    Anxiety    Asthma    AMA (advanced maternal age) multigravida 35+    Prenatal care    Obesity in pregnancy, antepartum    History of gastric surgery    Primigravida of advanced maternal age in second trimester    Placenta previa antepartum       Flu Status: Already given in current flu season  Ultrasound Today: No  Reviewed 1 hr glucose testing and TDAP next visit.    ROS -   Patient Denies: leaking of fluid, vaginal bleeding, dysuria, excessive vomiting, and more than 6 contractions per hour  Fetal Movement : normal  All other systems reviewed and are negative.       The additional following portions of the patient's history were reviewed and updated as appropriate: allergies and current medications.      I have reviewed and agree with the HPI, ROS, and historical information as entered above. Tiana Gonzáles MD      /68   Wt 90.4 kg (199 lb 4.8 oz)   LMP 2024 (Exact Date)   BMI 35.30 kg/m²       EXAM:     Prenatal Vitals  BP: 102/68  Weight: 90.4 kg (199 lb 4.8 oz)   Fetal Heart Rate: 140      Fundal Height (cm): 24 cm        Urine Glucose Read-only: Negative  Urine Protein Read-only: Negative       Assessment and Plan    Problem List Items Addressed This Visit          Gastrointestinal Abdominal     History of gastric surgery    Overview     History of gastric sleeve in             Gravid and     AMA (advanced maternal age) multigravida 35+    Overview     cfDNA negative.  Boy!         Prenatal care  - Primary    Relevant Orders    POC Urinalysis Dipstick (Completed)    Obesity in pregnancy, antepartum    Overview     Hgb A1C at NOB 4.9  Early 1hr gtt at 2nd visit 100  Discussed carbohydrate control  Recommend limiting weight gain to 15-20lbs  Recommend baby asa weeks 12-delivery  Growth US at 32 and 37 weeks           Primigravida of advanced maternal age in second trimester    Overview     cfDNA negative         Placenta previa antepartum    Overview     11/7 [facility]- Posterior placenta previa with normal cervical length             Pregnancy at 24w0d  Fetal status reassuring.  No US indicated today.  1 hour gtt, CBC, Antibody screen, TDAP, and RPR next visit. Instructions given  Discussed/encouraged TDAP vaccination after 28 weeks  Activity and Exercise discussed.  Return in about 4 weeks (around 1/2/2025) for glucola, [facility] same day.      Tiana Gonzáles MD  12/05/2024

## 2025-01-02 ENCOUNTER — OFFICE VISIT (OUTPATIENT)
Dept: OBSTETRICS AND GYNECOLOGY | Facility: HOSPITAL | Age: 40
End: 2025-01-02
Payer: COMMERCIAL

## 2025-01-02 ENCOUNTER — HOSPITAL ENCOUNTER (OUTPATIENT)
Dept: WOMENS IMAGING | Facility: HOSPITAL | Age: 40
Discharge: HOME | End: 2025-01-02
Admitting: OBSTETRICS & GYNECOLOGY
Payer: COMMERCIAL

## 2025-01-02 ENCOUNTER — ROUTINE PRENATAL (OUTPATIENT)
Dept: OBSTETRICS AND GYNECOLOGY | Facility: CLINIC | Age: 40
End: 2025-01-02
Payer: COMMERCIAL

## 2025-01-02 VITALS — DIASTOLIC BLOOD PRESSURE: 74 MMHG | WEIGHT: 202.6 LBS | SYSTOLIC BLOOD PRESSURE: 124 MMHG | BODY MASS INDEX: 35.89 KG/M2

## 2025-01-02 VITALS — DIASTOLIC BLOOD PRESSURE: 74 MMHG | WEIGHT: 200.8 LBS | SYSTOLIC BLOOD PRESSURE: 120 MMHG | BODY MASS INDEX: 35.57 KG/M2

## 2025-01-02 DIAGNOSIS — Z34.90 PREGNANCY, UNSPECIFIED GESTATIONAL AGE: ICD-10-CM

## 2025-01-02 DIAGNOSIS — O09.512 PRIMIGRAVIDA OF ADVANCED MATERNAL AGE IN SECOND TRIMESTER: ICD-10-CM

## 2025-01-02 DIAGNOSIS — O44.43 LOW-LYING PLACENTA WITHOUT HEMORRHAGE, THIRD TRIMESTER: Primary | ICD-10-CM

## 2025-01-02 DIAGNOSIS — O44.00 PLACENTA PREVIA ANTEPARTUM: ICD-10-CM

## 2025-01-02 DIAGNOSIS — Z98.890 HISTORY OF GASTRIC SURGERY: ICD-10-CM

## 2025-01-02 DIAGNOSIS — O09.523 MULTIGRAVIDA OF ADVANCED MATERNAL AGE IN THIRD TRIMESTER: ICD-10-CM

## 2025-01-02 DIAGNOSIS — Z34.90 PRENATAL CARE, ANTEPARTUM: ICD-10-CM

## 2025-01-02 DIAGNOSIS — O09.529 ANTEPARTUM MULTIGRAVIDA OF ADVANCED MATERNAL AGE: Primary | ICD-10-CM

## 2025-01-02 LAB
ERYTHROCYTE [DISTWIDTH] IN BLOOD BY AUTOMATED COUNT: 12.4 % (ref 12.3–15.4)
GLUCOSE 1H P 50 G GLC PO SERPL-MCNC: 48 MG/DL (ref 65–139)
GLUCOSE UR STRIP-MCNC: NEGATIVE MG/DL
HCT VFR BLD AUTO: 34.2 % (ref 34–46.6)
HGB BLD-MCNC: 11.2 G/DL (ref 12–15.9)
MCH RBC QN AUTO: 31.3 PG (ref 26.6–33)
MCHC RBC AUTO-ENTMCNC: 32.7 G/DL (ref 31.5–35.7)
MCV RBC AUTO: 95.5 FL (ref 79–97)
PLATELET # BLD AUTO: 205 10*3/MM3 (ref 140–450)
PROT UR STRIP-MCNC: NEGATIVE MG/DL
RBC # BLD AUTO: 3.58 10*6/MM3 (ref 3.77–5.28)
WBC # BLD AUTO: 11.07 10*3/MM3 (ref 3.4–10.8)

## 2025-01-02 PROCEDURE — 76816 OB US FOLLOW-UP PER FETUS: CPT

## 2025-01-02 RX ORDER — MULTIPLE VITAMINS W/ MINERALS TAB 9MG-400MCG
1 TAB ORAL DAILY
COMMUNITY

## 2025-01-02 NOTE — PROGRESS NOTES
"Documentation of the ultrasound findings, images, and interpretations will be available in the patient's Viewpoint report which is located in the imaging tab in chart review.    Maternal/Fetal Medicine Follow Up  Note     Name: Brenda Trejo    : 1985     MRN: [id number]     Referring Provider: CARLTON Ruggiero    Chief Complaint  AMA, Hx gastric sleeve, obesity, previa    Subjective     History of Present Illness:  Brenda Trejo is a 39 y.o.  28w0d who presents today for AMA, previa    LISHA: Estimated Date of Delivery: 3/27/25     ROS:   As noted in HPI.     Objective     Vital Signs  /74   Wt 91.1 kg (200 lb 12.8 oz)   LMP 2024 (Exact Date)   Estimated body mass index is 35.57 kg/m² as calculated from the following:    Height as of 24: 160 cm (63\").    Weight as of this encounter: 91.1 kg (200 lb 12.8 oz).    Physical Exam    Ultrasound Impression:   See Viewpoint    Assessment and Plan     Brenda Trejo is a 39 y.o.  28w0d who presents today for AMA, previa    Diagnoses and all orders for this visit:    1. Antepartum multigravida of advanced maternal age (Primary)  -     [facility]; Future    2. History of gastric surgery  -     [facility]; Future    3. Placenta previa antepartum  Assessment & Plan:  Patient returns today for follow-up for pregnancy complicated by advanced maternal age and placenta previa.  Patient reports no complications since her last visit.  In particular she has had no vaginal bleeding and notes good fetal movement.    Ultrasound today demonstrates a normally grown fetus with no abnormality seen.  Amniotic fluid volume and umbilical artery Dopplers are normal.  Cervical length is normal.  Placenta is 2.6 cm away from the internal os.    Placenta previa appears to be resolving and is now a low-lying placenta.  I would expect that the placenta will be far enough away from the cervix to allow for " a vaginal delivery.  In order to be certain we will rescan the patient again in 6 weeks time.    Patient was counseled extensively regarding fetal movement will contact her provider immediately if she notices any decreased fetal movement.    Orders:  -     [facility]; Future    4. Pregnancy, unspecified gestational age  -     [facility]; Future         Follow Up  Return in about 6 weeks (around 2025).    I spent 10 minutes caring for the patient on the day of service. This included: obtaining or reviewing a separately obtained medical history, reviewing patient records, performing a medically appropriate exam and/or evaluation, counseling or educating the patient/family/caregiver, ordering medications, labs, and/or procedures and documenting such in the medical record. This does not include time spent on review and interpretation of other tests such as fetal ultrasound or the performance of other procedures such as amniocentesis or CVS.      Douglas A. Milligan, MD  Maternal Fetal Medicine, [facility]    Diagnostic [place]     2025

## 2025-01-02 NOTE — PROGRESS NOTES
"      OB FOLLOW UP  CC- Here for care of pregnancy        Brenda Trejo is a 39 y.o.  28w0d patient being seen today for her obstetrical follow up. Patient reports no complaints.     Patient undergoing Glucola testing today. She is due for her testing at 9:23.     Pt states Dr. Gonzáles had given her two dates to choose from for IOL, would like to go ahead and schedule for 3/24, will send message to .       MBT: AB+  Rhogam: is not indicated.  28 week packet: reviewed with patient , counseled on fetal movement , pediatrician list reviewed, breast pump discussed, and childbirth classes reviewed  TDAP: given today  Flu Status: Already given in current flu season  Ultrasound Today: Yes at [facility]. \"Placenta previa appears to be resolving and is now a low-lying placenta, 2.6cm away from internal os. I would expect that the placenta will be far enough away from the cervix to allow for a vaginal delivery.  In order to be certain we will rescan the patient again in 6 weeks time.\" . 2#5oz (39%) and AC 17%. THANIA 12.9cm. CL 45.3mm          Her prenatal care is complicated by (and status) : see below.  Patient Active Problem List   Diagnosis    Family history of breast cancer in mother    Lumbar herniated disc    Eczema    Chronic back pain    Anxiety    Asthma    AMA (advanced maternal age) multigravida 35+    Prenatal care    Obesity in pregnancy, antepartum    History of gastric surgery    Primigravida of advanced maternal age in second trimester    Placenta previa antepartum         ROS -   Patient Denies: Loss of Fluid, Vaginal Spotting, Vision Changes, Headaches, Nausea , Vomiting , Contractions, Epigastric pain, and skin itching  Fetal Movement : normal    The additional following portions of the patient's history were reviewed and updated as appropriate: allergies and current medications.    I have reviewed and agree with the HPI, ROS, and historical information as entered above. Jodie Welsh, " APRN      /74   Wt 91.9 kg (202 lb 9.6 oz)   LMP 06/24/2024 (Exact Date)   BMI 35.89 kg/m²         EXAM:     Prenatal Vitals  BP: 124/74  Weight: 91.9 kg (202 lb 9.6 oz)   Fetal Heart Rate: PDC               Urine Glucose Read-only: Negative  Urine Protein Read-only: Negative         Assessment and Plan    Problem List Items Addressed This Visit       AMA (advanced maternal age) multigravida 35+    Overview     cfDNA negative.  Boy!         Prenatal care    Relevant Orders    POC Urinalysis Dipstick (Completed)    CBC (No Diff)    Gestational Screen 1 Hr ([facility])    Antibody Screen    RPR, Rfx Qn RPR / Confirm TP    Tdap Vaccine Greater Than or Equal To 6yo IM (Completed)     Other Visit Diagnoses       Low-lying placenta without hemorrhage, third trimester    -  Primary            Pregnancy at 28w0d  1 hr Glucola, CBC, RPR. Antibody screen and TDAP today  Fetal movement/PTL or Labor precautions  Reviewed Pre-eclampsia signs/symptoms  Discussed bASA for PIH prevention from 12 to 36wk  Activity and Exercise discussed.  Return in about 4 weeks (around 1/30/2025) for Eliecer RAMOS.        CARLTON Ruggiero  01/02/2025

## 2025-01-02 NOTE — LETTER
"2025     CARLTON Ruggiero  [address] [facility] [address] [facility]  [address] [place], [place] [id number]    Patient: Brenda Trejo   YOB: 1985   Date of Visit: 2025     Dear CARLTON Ruggiero:       Thank you for referring Brenda Trejo to me for evaluation. Below are the relevant portions of my assessment and plan of care.    If you have questions, please do not hesitate to call me. I look forward to following Brenda along with you.         Sincerely,        Douglas A. Milligan, MD        CC: No Recipients    Milligan, Douglas A, MD  [phone]  Sign when Signing Visit  Documentation of the ultrasound findings, images, and interpretations will be available in the patient's Viewpoint report which is located in the imaging tab in chart review.    Maternal/Fetal Medicine Follow Up  Note     Name: Brenda Trejo    : 1985     MRN: [id number]     Referring Provider: CARLTON Ruggiero    Chief Complaint  AMA, Hx gastric sleeve, obesity, previa    Subjective     History of Present Illness:  Brenda Trejo is a 39 y.o.  28w0d who presents today for AMA, previa    LISHA: Estimated Date of Delivery: 3/27/25     ROS:   As noted in HPI.     Objective     Vital Signs  /74   Wt 91.1 kg (200 lb 12.8 oz)   LMP 2024 (Exact Date)   Estimated body mass index is 35.57 kg/m² as calculated from the following:    Height as of 24: 160 cm (63\").    Weight as of this encounter: 91.1 kg (200 lb 12.8 oz).    Physical Exam    Ultrasound Impression:   See Viewpoint    Assessment and Plan     Brenda Trejo is a 39 y.o.  28w0d who presents today for AMA, previa    Diagnoses and all orders for this visit:    1. Antepartum multigravida of advanced maternal age (Primary)  -     [facility]; Future    2. History of gastric surgery  -     [facility]; Future    3. Placenta previa antepartum  Assessment & Plan:  Patient returns today for follow-up " for pregnancy complicated by advanced maternal age and placenta previa.  Patient reports no complications since her last visit.  In particular she has had no vaginal bleeding and notes good fetal movement.    Ultrasound today demonstrates a normally grown fetus with no abnormality seen.  Amniotic fluid volume and umbilical artery Dopplers are normal.  Cervical length is normal.  Placenta is 2.6 cm away from the internal os.    Placenta previa appears to be resolving and is now a low-lying placenta.  I would expect that the placenta will be far enough away from the cervix to allow for a vaginal delivery.  In order to be certain we will rescan the patient again in 6 weeks time.    Patient was counseled extensively regarding fetal movement will contact her provider immediately if she notices any decreased fetal movement.    Orders:  -     [facility]; Future    4. Pregnancy, unspecified gestational age  -     [facility]; Future         Follow Up  Return in about 6 weeks (around 2025).    I spent 10 minutes caring for the patient on the day of service. This included: obtaining or reviewing a separately obtained medical history, reviewing patient records, performing a medically appropriate exam and/or evaluation, counseling or educating the patient/family/caregiver, ordering medications, labs, and/or procedures and documenting such in the medical record. This does not include time spent on review and interpretation of other tests such as fetal ultrasound or the performance of other procedures such as amniocentesis or CVS.      Douglas A. Milligan, MD  Maternal Fetal Medicine, [facility]     2025

## 2025-01-02 NOTE — ASSESSMENT & PLAN NOTE
Patient returns today for follow-up for pregnancy complicated by advanced maternal age and placenta previa.  Patient reports no complications since her last visit.  In particular she has had no vaginal bleeding and notes good fetal movement.    Ultrasound today demonstrates a normally grown fetus with no abnormality seen.  Amniotic fluid volume and umbilical artery Dopplers are normal.  Cervical length is normal.  Placenta is 2.6 cm away from the internal os.    Placenta previa appears to be resolving and is now a low-lying placenta.  I would expect that the placenta will be far enough away from the cervix to allow for a vaginal delivery.  In order to be certain we will rescan the patient again in 6 weeks time.    Patient was counseled extensively regarding fetal movement will contact her provider immediately if she notices any decreased fetal movement.

## 2025-01-02 NOTE — PROGRESS NOTES
Patient denies any leaking fluid, vaginal bleeding, or contractions.  NIPT negative.  Patient reports next follow-up appointment with Dr. Gonzáles's office is today.

## 2025-01-03 LAB
BLD GP AB SCN SERPL QL: NEGATIVE
RPR SER QL: NON REACTIVE

## 2025-01-10 ENCOUNTER — APPOINTMENT (OUTPATIENT)
Dept: WOMENS IMAGING | Facility: HOSPITAL | Age: 40
End: 2025-01-10
Payer: COMMERCIAL

## 2025-01-10 ENCOUNTER — HOSPITAL ENCOUNTER (OUTPATIENT)
Facility: HOSPITAL | Age: 40
Setting detail: OBSERVATION
Discharge: HOME | End: 2025-01-10
Attending: OBSTETRICS & GYNECOLOGY | Admitting: OBSTETRICS & GYNECOLOGY
Payer: COMMERCIAL

## 2025-01-10 VITALS
DIASTOLIC BLOOD PRESSURE: 71 MMHG | OXYGEN SATURATION: 100 % | TEMPERATURE: 97.6 F | WEIGHT: 199 LBS | HEIGHT: 63 IN | SYSTOLIC BLOOD PRESSURE: 124 MMHG | HEART RATE: 72 BPM | RESPIRATION RATE: 16 BRPM | BODY MASS INDEX: 35.26 KG/M2

## 2025-01-10 PROBLEM — E87.6 HYPOKALEMIA: Status: ACTIVE | Noted: 2025-01-10

## 2025-01-10 PROBLEM — O36.8130 DECREASED FETAL MOVEMENT AFFECTING MANAGEMENT OF PREGNANCY IN THIRD TRIMESTER, SINGLE OR UNSPECIFIED FETUS: Status: ACTIVE | Noted: 2025-01-10

## 2025-01-10 PROBLEM — E83.42 HYPOMAGNESEMIA: Status: ACTIVE | Noted: 2025-01-10

## 2025-01-10 LAB
ABO GROUP BLD: NORMAL
ALBUMIN SERPL-MCNC: 3.3 G/DL (ref 3.5–5.2)
ALBUMIN/GLOB SERPL: 1.3 G/DL
ALP SERPL-CCNC: 64 U/L (ref 39–117)
ALT SERPL W P-5'-P-CCNC: 9 U/L (ref 1–33)
ANION GAP SERPL CALCULATED.3IONS-SCNC: 12 MMOL/L (ref 5–15)
AST SERPL-CCNC: 14 U/L (ref 1–32)
BILIRUB SERPL-MCNC: 0.2 MG/DL (ref 0–1.2)
BILIRUB UR QL STRIP: NEGATIVE
BLD GP AB SCN SERPL QL: NEGATIVE
BUN SERPL-MCNC: 11 MG/DL (ref 6–20)
BUN/CREAT SERPL: 23.4 (ref 7–25)
CALCIUM SPEC-SCNC: 8.4 MG/DL (ref 8.6–10.5)
CHLORIDE SERPL-SCNC: 104 MMOL/L (ref 98–107)
CLARITY UR: CLEAR
CO2 SERPL-SCNC: 22 MMOL/L (ref 22–29)
COLOR UR: YELLOW
CREAT SERPL-MCNC: 0.47 MG/DL (ref 0.57–1)
DEPRECATED RDW RBC AUTO: 42.3 FL (ref 37–54)
EGFRCR SERPLBLD CKD-EPI 2021: 124.4 ML/MIN/1.73
ERYTHROCYTE [DISTWIDTH] IN BLOOD BY AUTOMATED COUNT: 13 % (ref 12.3–15.4)
GLOBULIN UR ELPH-MCNC: 2.6 GM/DL
GLUCOSE SERPL-MCNC: 95 MG/DL (ref 65–99)
GLUCOSE UR STRIP-MCNC: NEGATIVE MG/DL
HCT VFR BLD AUTO: 33.6 % (ref 34–46.6)
HGB BLD-MCNC: 11.7 G/DL (ref 12–15.9)
HGB UR QL STRIP.AUTO: NEGATIVE
KETONES UR QL STRIP: NEGATIVE
LEUKOCYTE ESTERASE UR QL STRIP.AUTO: NEGATIVE
MAGNESIUM SERPL-MCNC: 1.8 MG/DL (ref 1.6–2.6)
MCH RBC QN AUTO: 31.9 PG (ref 26.6–33)
MCHC RBC AUTO-ENTMCNC: 34.8 G/DL (ref 31.5–35.7)
MCV RBC AUTO: 91.6 FL (ref 79–97)
NITRITE UR QL STRIP: NEGATIVE
PH UR STRIP.AUTO: 6.5 [PH] (ref 5–8)
PLATELET # BLD AUTO: 184 10*3/MM3 (ref 140–450)
PMV BLD AUTO: 9.2 FL (ref 6–12)
POTASSIUM SERPL-SCNC: 3.1 MMOL/L (ref 3.5–5.2)
PROT SERPL-MCNC: 5.9 G/DL (ref 6–8.5)
PROT UR QL STRIP: NEGATIVE
RBC # BLD AUTO: 3.67 10*6/MM3 (ref 3.77–5.28)
RH BLD: POSITIVE
SODIUM SERPL-SCNC: 138 MMOL/L (ref 136–145)
SP GR UR STRIP: 1.01 (ref 1–1.03)
T&S EXPIRATION DATE: NORMAL
UROBILINOGEN UR QL STRIP: NORMAL
WBC NRBC COR # BLD AUTO: 8.18 10*3/MM3 (ref 3.4–10.8)

## 2025-01-10 PROCEDURE — 76819 FETAL BIOPHYS PROFIL W/O NST: CPT | Performed by: OBSTETRICS & GYNECOLOGY

## 2025-01-10 PROCEDURE — G0463 HOSPITAL OUTPT CLINIC VISIT: HCPCS

## 2025-01-10 PROCEDURE — 85027 COMPLETE CBC AUTOMATED: CPT | Performed by: OBSTETRICS & GYNECOLOGY

## 2025-01-10 PROCEDURE — 25810000003 LACTATED RINGERS PER 1000 ML: Performed by: OBSTETRICS & GYNECOLOGY

## 2025-01-10 PROCEDURE — 76819 FETAL BIOPHYS PROFIL W/O NST: CPT

## 2025-01-10 PROCEDURE — 80053 COMPREHEN METABOLIC PANEL: CPT | Performed by: OBSTETRICS & GYNECOLOGY

## 2025-01-10 PROCEDURE — G0378 HOSPITAL OBSERVATION PER HR: HCPCS

## 2025-01-10 PROCEDURE — 81003 URINALYSIS AUTO W/O SCOPE: CPT | Performed by: OBSTETRICS & GYNECOLOGY

## 2025-01-10 PROCEDURE — 59025 FETAL NON-STRESS TEST: CPT | Performed by: OBSTETRICS & GYNECOLOGY

## 2025-01-10 PROCEDURE — 86850 RBC ANTIBODY SCREEN: CPT | Performed by: OBSTETRICS & GYNECOLOGY

## 2025-01-10 PROCEDURE — 25010000002 MAGNESIUM SULFATE 4 GM/100ML SOLUTION: Performed by: OBSTETRICS & GYNECOLOGY

## 2025-01-10 PROCEDURE — 86901 BLOOD TYPING SEROLOGIC RH(D): CPT | Performed by: OBSTETRICS & GYNECOLOGY

## 2025-01-10 PROCEDURE — 96361 HYDRATE IV INFUSION ADD-ON: CPT

## 2025-01-10 PROCEDURE — 59025 FETAL NON-STRESS TEST: CPT

## 2025-01-10 PROCEDURE — 96360 HYDRATION IV INFUSION INIT: CPT

## 2025-01-10 PROCEDURE — 86900 BLOOD TYPING SEROLOGIC ABO: CPT | Performed by: OBSTETRICS & GYNECOLOGY

## 2025-01-10 PROCEDURE — 83735 ASSAY OF MAGNESIUM: CPT | Performed by: OBSTETRICS & GYNECOLOGY

## 2025-01-10 PROCEDURE — 25810000003 DEXTROSE 5% IN LACTATED RINGERS PER 1000 ML: Performed by: OBSTETRICS & GYNECOLOGY

## 2025-01-10 PROCEDURE — 99239 HOSP IP/OBS DSCHRG MGMT >30: CPT | Performed by: OBSTETRICS & GYNECOLOGY

## 2025-01-10 RX ORDER — SODIUM CHLORIDE 0.9 % (FLUSH) 0.9 %
10 SYRINGE (ML) INJECTION AS NEEDED
Status: DISCONTINUED | OUTPATIENT
Start: 2025-01-10 | End: 2025-01-10 | Stop reason: HOSPADM

## 2025-01-10 RX ORDER — LIDOCAINE HYDROCHLORIDE 10 MG/ML
0.5 INJECTION, SOLUTION EPIDURAL; INFILTRATION; INTRACAUDAL; PERINEURAL ONCE AS NEEDED
Status: DISCONTINUED | OUTPATIENT
Start: 2025-01-10 | End: 2025-01-10 | Stop reason: HOSPADM

## 2025-01-10 RX ORDER — SODIUM CHLORIDE 9 MG/ML
40 INJECTION, SOLUTION INTRAVENOUS AS NEEDED
Status: DISCONTINUED | OUTPATIENT
Start: 2025-01-10 | End: 2025-01-10 | Stop reason: HOSPADM

## 2025-01-10 RX ORDER — POTASSIUM CHLORIDE 1500 MG/1
40 TABLET, EXTENDED RELEASE ORAL EVERY 4 HOURS
Status: COMPLETED | OUTPATIENT
Start: 2025-01-10 | End: 2025-01-10

## 2025-01-10 RX ORDER — SODIUM CHLORIDE, SODIUM LACTATE, POTASSIUM CHLORIDE, CALCIUM CHLORIDE 600; 310; 30; 20 MG/100ML; MG/100ML; MG/100ML; MG/100ML
125 INJECTION, SOLUTION INTRAVENOUS CONTINUOUS
Status: ACTIVE | OUTPATIENT
Start: 2025-01-10 | End: 2025-01-10

## 2025-01-10 RX ORDER — MAGNESIUM SULFATE HEPTAHYDRATE 40 MG/ML
4 INJECTION, SOLUTION INTRAVENOUS ONCE
Status: COMPLETED | OUTPATIENT
Start: 2025-01-10 | End: 2025-01-10

## 2025-01-10 RX ORDER — SODIUM CHLORIDE 0.9 % (FLUSH) 0.9 %
10 SYRINGE (ML) INJECTION EVERY 12 HOURS SCHEDULED
Status: DISCONTINUED | OUTPATIENT
Start: 2025-01-10 | End: 2025-01-10 | Stop reason: HOSPADM

## 2025-01-10 RX ORDER — DEXTROSE, SODIUM CHLORIDE, SODIUM LACTATE, POTASSIUM CHLORIDE, AND CALCIUM CHLORIDE 5; .6; .31; .03; .02 G/100ML; G/100ML; G/100ML; G/100ML; G/100ML
1000 INJECTION, SOLUTION INTRAVENOUS ONCE
Status: COMPLETED | OUTPATIENT
Start: 2025-01-10 | End: 2025-01-10

## 2025-01-10 RX ADMIN — SODIUM CHLORIDE, POTASSIUM CHLORIDE, SODIUM LACTATE AND CALCIUM CHLORIDE 125 ML/HR: 600; 310; 30; 20 INJECTION, SOLUTION INTRAVENOUS at 04:19

## 2025-01-10 RX ADMIN — MAGNESIUM SULFATE IN WATER FOR 4 G: 40 INJECTION INTRAVENOUS at 08:45

## 2025-01-10 RX ADMIN — POTASSIUM CHLORIDE 40 MEQ: 1500 TABLET, EXTENDED RELEASE ORAL at 12:40

## 2025-01-10 RX ADMIN — SODIUM CHLORIDE, SODIUM LACTATE, POTASSIUM CHLORIDE, CALCIUM CHLORIDE AND DEXTROSE MONOHYDRATE 1000 ML/HR: 5; 600; 310; 30; 20 INJECTION, SOLUTION INTRAVENOUS at 03:18

## 2025-01-10 RX ADMIN — POTASSIUM CHLORIDE 40 MEQ: 1500 TABLET, EXTENDED RELEASE ORAL at 05:33

## 2025-01-10 RX ADMIN — POTASSIUM CHLORIDE 40 MEQ: 1500 TABLET, EXTENDED RELEASE ORAL at 08:45

## 2025-01-10 RX ADMIN — Medication 10 ML: at 03:18

## 2025-01-10 NOTE — DISCHARGE SUMMARY
Admission date: 1/10/2025  Discharge date: 01/10/25    Referring Provider: Tiana Gonzáles MD    Admission diagnosis:  Decreased fetal movement affecting management of pregnancy in third trimester, single or unspecified fetus [O36.8130]      Decreased fetal movement affecting management of pregnancy in third trimester, single or unspecified fetus       Discharge diagnosis: Decreased fetal movement, hypokalemia, hypomagnesemia      Consultants: [facility]      Hospital course: 39-year-old  1 para 0 presented at 20 weeks and 1 day with decreased fetal movement.  She had minimal variability and was observed for several hours.  Prenatal diagnostic center performed an ultrasound that showed normal movement and Dopplers.  Patient was started to feel more movement.  Noted at that time to also have hypokalemia and hypomagnesemia.  These were replaced.        Vitals:    01/10/25 0736   BP: 124/71   Pulse: 72   Resp: 16   Temp: 97.6 °F (36.4 °C)   SpO2:        GENERAL:  Well-developed, well-nourished in no acute distress.   ABD:  Gravid  NST: Indication: Decreased fetal movement.  Time on 8:10 AM-time off 10:00.  Category 1 reactive.  SVE: Deferred    EXTREMITIES:  No clubbing, cyanosis or edema.  PSYCHIATRIC:  Normal affect and mood.      Discharge condition: stable  Discharge diet   Dietary Orders (From admission, onward)       Start     Ordered    01/10/25 1000  Diet: Regular/House; Fluid Consistency: Thin (IDDSI 0)  Diet Effective Now        References:    Diet Order Definitions   Question Answer Comment   Diets: Regular/House    Fluid Consistency: Thin (IDDSI 0)        01/10/25 0959                  Additional Instructions: Call with fevers, uncontrolled nausea/vomiting/pain.  Medications:      Discharge Medications        ASK your doctor about these medications        Instructions Start Date   BABY ASPIRIN PO   Take  by mouth.      Calcium 250 MG capsule   Take  by mouth.      Cholecalciferol 1.25 MG (00882 UT)  tablet   1.25 mg, Oral, Weekly      famotidine 20 MG tablet  Commonly known as: PEPCID   20 mg, Oral, 2 Times Daily      ferrous sulfate 325 (65 FE) MG tablet   325 mg, Daily With Breakfast      fish oil 1000 MG capsule capsule   Daily With Breakfast      multivitamin with minerals tablet tablet   1 tablet, Daily      ondansetron ODT 4 MG disintegrating tablet  Commonly known as: ZOFRAN-ODT   4 mg, Oral, Every 8 Hours PRN      prenatal vitamin 27-0.8 27-0.8 MG tablet tablet   Daily      promethazine 12.5 MG tablet  Commonly known as: PHENERGAN   12.5 mg, Oral, Every 6 Hours PRN      sucralfate 1 g tablet  Commonly known as: CARAFATE   1 g, Oral, 4 Times Daily Before Meals & Nightly      thiamine 100 MG tablet  tablet  Commonly known as: VITAMIN B-1   100 mg, Daily      vitamin B-12 500 MCG tablet  Commonly known as: CYANOCOBALAMIN   500 mcg, Daily      vitamin B-6 50 MG tablet  Commonly known as: PYRIDOXINE   50 mg, Daily             Disposition:Home or Self Care  Follow up:   Future Appointments   Date Time Provider Department Center   1/30/2025  9:20 AM Tiana Gonzáles MD [facility]   2/6/2025  9:45 AM [facility] US 1  [facility]   2/6/2025  9:45 AM  [facility] FOLLOW UP [facility] None   2/6/2025 10:40 AM Tiana Gonzáles MD [facility]   2/13/2025  8:10 AM Tiana Gonzáles MD [facility]   2/20/2025  8:10 AM Tiana Gonzáles MD [facility]   2/27/2025  8:10 AM Tiana Gonzáles MD [facility]   3/6/2025  8:10 AM Tiana Gonzáles MD [facility]   3/13/2025  8:10 AM Tiana Gonzáles MD [facility]   3/20/2025  8:10 AM Tiana Gonzáles MD [facility]   3/23/2025  9:00 PM [facility] LD INDUCTION ROOM 2 [facility]        30 minutes on discharge.  Counseling and coordinating care.    Tiana Gonzáles MD

## 2025-01-10 NOTE — H&P
"Brenda Trejo  1985  [id number]  [id number]    CC: decreased fetal movement  HPI:  Patient is 39 y.o. female   currently at 29w1d presents with c/o decreased FM.  Normal FM thru ~2100.  Since that time, pt has not felt any movement.  Pt denies contractions, bleeding, or leaking.  PNC comp by adv mat age    PMH:  Current meds: PNV, Fe, inhaler (hasn't used in >3 years)  Illnesses: asthma  Surgeries: gastric sleeve, T and A, oral surg, discectomy X 2, carpal tunnel release,   Trigger finger release  Allergies: PCN- hives       Sulfa- hives    Past OB History:       OB History    Para Term  AB Living   1 0 0 0 0 0   SAB IAB Ectopic Molar Multiple Live Births   0 0 0 0 0 0      # Outcome Date GA Lbr Geovany/2nd Weight Sex Type Anes PTL Lv   1 Current                     SH: tob neg , EtOH neg, drugs neg      General ROS: decreased FM.   All other systems reviewed and are negative.      Physical Examination: General appearance - alert, well appearing, and in no distress  Vital signs - Pulse 72   Temp 98.2 °F (36.8 °C) (Oral)   Resp 18   Ht 160 cm (63\")   Wt 90.3 kg (199 lb)   LMP 2024 (Exact Date)   SpO2 100%   BMI 35.25 kg/m²   Neck - supple, no significant adenopathy, no thyromegaly  Lymphatics - no palpable lymphadenopathy in the neck or groin, no hepatosplenomegaly  Chest - clear to auscultation, no wheezes, rales or rhonchi, respiratory effort non-labored  Heart - normal rate, regular rhythm, no murmurs, rubs, clicks or gallops, no JVD, no lower extremity edema  Abdomen - soft, nontender, nondistended, no masses, no hepatosplenomegaly  no rebound tenderness noted, bowel sounds normal  Extremities - no pedal edema noted, no calf tenderness  Skin -warm and dry, normal coloration and turgor, no rashes, no suspicious skin lesions noted      Fetal monitoring: indication decreased fetal movement, onset 0046, offset 0145, baseline 135, mod BTB variability, few accels (10 X 10), no " decels, no contractions, interpretation minimally reactive NST    Radiology US: (limited scan) normal fluid, gross movement, no FBM (pt can see FM   On US but cannot feel it)    Assessment 1)IUP 29 1/7 weeks   2)decreased FM   3)adv mat age    Plan 1)observe   2)prolonged FM and labs   3)if EFM doesn't improve, may keep overnight for PDC scan in am    Mark Rodriges MD  1/10/2025  01:46 EST

## 2025-01-30 ENCOUNTER — ROUTINE PRENATAL (OUTPATIENT)
Dept: OBSTETRICS AND GYNECOLOGY | Facility: CLINIC | Age: 40
End: 2025-01-30
Payer: COMMERCIAL

## 2025-01-30 VITALS — DIASTOLIC BLOOD PRESSURE: 72 MMHG | WEIGHT: 207.6 LBS | SYSTOLIC BLOOD PRESSURE: 120 MMHG | BODY MASS INDEX: 36.77 KG/M2

## 2025-01-30 DIAGNOSIS — Z34.90 PRENATAL CARE, ANTEPARTUM: Primary | ICD-10-CM

## 2025-01-30 DIAGNOSIS — Z98.890 HISTORY OF GASTRIC SURGERY: ICD-10-CM

## 2025-01-30 DIAGNOSIS — L29.9 ITCHING: ICD-10-CM

## 2025-01-30 DIAGNOSIS — O09.512 PRIMIGRAVIDA OF ADVANCED MATERNAL AGE IN SECOND TRIMESTER: ICD-10-CM

## 2025-01-30 DIAGNOSIS — O99.210 OBESITY IN PREGNANCY, ANTEPARTUM: ICD-10-CM

## 2025-01-30 DIAGNOSIS — O44.43 LOW-LYING PLACENTA WITHOUT HEMORRHAGE, THIRD TRIMESTER: ICD-10-CM

## 2025-01-30 DIAGNOSIS — O09.529 ANTEPARTUM MULTIGRAVIDA OF ADVANCED MATERNAL AGE: ICD-10-CM

## 2025-01-30 LAB
GLUCOSE UR STRIP-MCNC: NEGATIVE MG/DL
PROT UR STRIP-MCNC: NEGATIVE MG/DL

## 2025-01-30 NOTE — PROGRESS NOTES
OB FOLLOW UP  CC- Here for care of pregnancy        Brenda Trejo is a 39 y.o.  32w0d patient being seen today for her obstetrical follow up visit. Patient reports occasional nausea and vomiting. Pt reports intermittent itching on her back for past 1 week.     Pt went to labor allred on 1/10 for decreased fetal movement.     Her prenatal care is complicated by (and status) :  see below.  Patient Active Problem List   Diagnosis    Family history of breast cancer in mother    Lumbar herniated disc    Eczema    Chronic back pain    Anxiety    Asthma    AMA (advanced maternal age) multigravida 35+    Prenatal care    Obesity in pregnancy, antepartum    History of gastric surgery    Primigravida of advanced maternal age in second trimester    Placenta previa antepartum    Low-lying placenta without hemorrhage, third trimester    Decreased fetal movement affecting management of pregnancy in third trimester, single or unspecified fetus    Hypokalemia    Hypomagnesemia       Flu Status: Already given in current flu season  TDAP status: received at last visit  RSV vaccine:  out of stock  Rhogam status: was not indicated  28 week labs: Reviewed and Labs show anemia. She is taking additional iron supplement.  Ultrasound Today: No. Next US at [facility] on   Non Stress Test: No.      ROS -   Patient Denies: Loss of Fluid, Vaginal Spotting, Vision Changes, Headaches, Nausea , Vomiting , Contractions, and Epigastric pain  Fetal Movement : normal  Other than what is documented in the HPI, all other systems reviewed and are negative.     The additional following portions of the patient's history were reviewed and updated as appropriate: allergies and current medications.    I have reviewed and agree with the HPI, ROS, and historical information as entered above. Tiana Gonzáles MD      /72   Wt 94.2 kg (207 lb 9.6 oz)   LMP 2024 (Exact Date)   BMI 36.77 kg/m²         EXAM:     Prenatal Vitals  BP:  "120/72  Weight: 94.2 kg (207 lb 9.6 oz)   Fetal Heart Rate: 133      Fundal Height (cm): 32 cm        Urine Glucose Read-only: Negative  Urine Protein Read-only: Negative           Assessment and Plan    Problem List Items Addressed This Visit          Gastrointestinal Abdominal     History of gastric surgery    Overview     History of gastric sleeve in             Gravid and     AMA (advanced maternal age) multigravida 35+    Overview     cfDNA negative.  Boy!         Prenatal care - Primary    Relevant Orders    POC Urinalysis Dipstick (Completed)    Obesity in pregnancy, antepartum    Overview     Hgb A1C at NOB 4.9  Early 1hr gtt at 2nd visit 100  Discussed carbohydrate control  Recommend limiting weight gain to 15-20lbs  Recommend baby asa weeks 12-delivery  Growth US at 32 and 37 weeks           Primigravida of advanced maternal age in second trimester    Overview     cfDNA negative         Low-lying placenta without hemorrhage, third trimester    Overview     PDC 25 \"Placenta previa appears to be resolving and is now a low-lying placenta, 2.6cm away from internal os. I would expect that the placenta will be far enough away from the cervix to allow for a vaginal delivery.  In order to be certain we will rescan the patient again in 6 weeks time.\"          Other Visit Diagnoses       Itching        Relevant Orders    Bile Acids, Total    AlP+ALT+AST+Creat+LD+TBili+..            Pregnancy at 32w0d  Fetal status reassuring.  28 week labs reviewed.    Will check labs today secondary to itching.  Activity and Exercise discussed.  Fetal movement/PTL or Labor precautions  Return in about 1 week (around 2025) for PDC same day, also schedule appointment in 3 weeks.    Tiana Gonzáles MD  2025   "

## 2025-01-31 LAB
ALP SERPL-CCNC: 92 IU/L (ref 44–121)
ALT SERPL-CCNC: 11 IU/L (ref 0–32)
AST SERPL-CCNC: 20 IU/L (ref 0–40)
BASOPHILS # BLD AUTO: 0 X10E3/UL (ref 0–0.2)
BASOPHILS NFR BLD AUTO: 0 %
BILIRUB SERPL-MCNC: <0.2 MG/DL (ref 0–1.2)
CREAT SERPL-MCNC: 0.65 MG/DL (ref 0.57–1)
EGFRCR SERPLBLD CKD-EPI 2021: 115 ML/MIN/1.73
EOSINOPHIL # BLD AUTO: 0.1 X10E3/UL (ref 0–0.4)
EOSINOPHIL NFR BLD AUTO: 1 %
ERYTHROCYTE [DISTWIDTH] IN BLOOD BY AUTOMATED COUNT: 12.6 % (ref 11.7–15.4)
HCT VFR BLD AUTO: 36 % (ref 34–46.6)
HGB BLD-MCNC: 11.8 G/DL (ref 11.1–15.9)
IMM GRANULOCYTES # BLD AUTO: 0.1 X10E3/UL (ref 0–0.1)
IMM GRANULOCYTES NFR BLD AUTO: 1 %
LDH SERPL L TO P-CCNC: 140 IU/L (ref 119–226)
LYMPHOCYTES # BLD AUTO: 2.1 X10E3/UL (ref 0.7–3.1)
LYMPHOCYTES NFR BLD AUTO: 18 %
MCH RBC QN AUTO: 30.7 PG (ref 26.6–33)
MCHC RBC AUTO-ENTMCNC: 32.8 G/DL (ref 31.5–35.7)
MCV RBC AUTO: 94 FL (ref 79–97)
MONOCYTES # BLD AUTO: 0.5 X10E3/UL (ref 0.1–0.9)
MONOCYTES NFR BLD AUTO: 4 %
NEUTROPHILS # BLD AUTO: 9 X10E3/UL (ref 1.4–7)
NEUTROPHILS NFR BLD AUTO: 76 %
PLATELET # BLD AUTO: 201 X10E3/UL (ref 150–450)
RBC # BLD AUTO: 3.84 X10E6/UL (ref 3.77–5.28)
URATE SERPL-MCNC: 4.2 MG/DL (ref 2.6–6.2)
WBC # BLD AUTO: 11.9 X10E3/UL (ref 3.4–10.8)

## 2025-02-01 LAB — BILE AC SERPL-SCNC: 4.1 UMOL/L (ref 0–10)

## 2025-02-03 ENCOUNTER — TELEPHONE (OUTPATIENT)
Dept: OBSTETRICS AND GYNECOLOGY | Facility: CLINIC | Age: 40
End: 2025-02-03
Payer: COMMERCIAL

## 2025-02-03 ENCOUNTER — HOSPITAL ENCOUNTER (OUTPATIENT)
Facility: HOSPITAL | Age: 40
Discharge: HOME | End: 2025-02-03
Attending: OBSTETRICS & GYNECOLOGY | Admitting: OBSTETRICS & GYNECOLOGY
Payer: COMMERCIAL

## 2025-02-03 VITALS
TEMPERATURE: 98 F | DIASTOLIC BLOOD PRESSURE: 68 MMHG | OXYGEN SATURATION: 97 % | RESPIRATION RATE: 20 BRPM | SYSTOLIC BLOOD PRESSURE: 106 MMHG | HEART RATE: 86 BPM

## 2025-02-03 PROBLEM — O36.8190 DECREASED FETAL MOVEMENT: Status: ACTIVE | Noted: 2025-02-03

## 2025-02-03 LAB
EXPIRATION DATE: NORMAL
Lab: NORMAL
PROT UR STRIP-MCNC: NEGATIVE MG/DL

## 2025-02-03 PROCEDURE — G0378 HOSPITAL OBSERVATION PER HR: HCPCS

## 2025-02-03 PROCEDURE — 59025 FETAL NON-STRESS TEST: CPT

## 2025-02-03 PROCEDURE — 81002 URINALYSIS NONAUTO W/O SCOPE: CPT | Performed by: OBSTETRICS & GYNECOLOGY

## 2025-02-03 PROCEDURE — G0463 HOSPITAL OUTPT CLINIC VISIT: HCPCS

## 2025-02-03 NOTE — TELEPHONE ENCOUNTER
Caller: Brenda Trejo     Relationship: SELF    Best call back number: [phone] (home) [phone] (work)      What is your medical concern? PT WENT TO THE WALKING CLINIC ON SATURDAY AND WAS DX WITH A DOUBLE EAR INFECTION AND SINUS INFECTION AND WAS STARTED ON KEFLEX THAT DAY. HER SYMPTOMS HAVE NOT APPROVED AND ACTUALLY HAVE GOTTEN WORSE. LATE YESTERDAY SHE STARTED HAVING PAINS IN HER ABDOMEN. THEY ARE INTERMITTENT BUT HAS HAPPENED A LOT. SHE IS WANTING TO SPEAK WITH A NURSE ABOUT THIS.     How long has this issue been going on? STARTED NOT FEELING GOOD 2 WEEKS AGO- SYMPTOMS WORSENED BY THIS PAST FRIDAY AND WAS VERY BAD ON SATURDAY MORNING WHEN SHE WOKE UP    Is your provider already aware of this issue? NO    Have you been treated for this issue? NO

## 2025-02-03 NOTE — TELEPHONE ENCOUNTER
Patient of Dr. Gonzáles; G1 @ 32w 4d. LOV 01/30/25; NOV 02/06/25.  Returned patient's call.   Reports onset 2 weeks ago of sore throat and congestion.   Thought she was improving after a few days then began to worsen.   States she was in [facility]  02/01/25; dx with bilateral ear infections and sinus infection.   States she has been taking the Keflex that was prescribed.   Still having sinus pressure, congestion, sore throat, sneezing, and now has a dry cough.   Having chills and sweats but temp is normal; 98.6.  Does have some body aches.   States she tested negative for Flu A & B and Covid @ [facility].   Reports having pain across abdomen at level of umbilicus since last night. Describes as intermittent but frequent; sharp; lasts 20 seconds or less; nothing makes it better; and nothing makes it worse.  States it is not cramping; thinks it may be muscular in nature.   Denies any bleeding, leaking fluid, or contractions.   States she has not taken any medication, other than the Keflex, for her symptoms.   Discussed that she can try plain Mucinex, plain Robitussin, cough drops, Zyrtec or Claritin, Benadryl, or Flonase nasal spray. Can use humidifier; drink plenty of water, and get plenty of rest.   Patient v/u and agreed.   Discussed with Dr. Gonzáles; no additional recommendations.

## 2025-02-03 NOTE — TELEPHONE ENCOUNTER
Pt called and reported that she tested positive for Flu A with at home kit. Pt also reports some intermittent pain in abdomen. Pt states she is also taking keflex for a double ear infection. Pt spoke to Khadra Trivedi RN earlier and is the process of getting the medications to help her. Dr. Gonzáles notified of positive flu A and she recommends the patient push fluids due to it being viral. Per Dr. Gonzáles, pt informed if abdominal pain gets severe/significant pain, she is to go to labor allred on 3rd floor. Pt also reports she has decreased fetal movement. Pt instructed to go to labor allred to be evaluated. Pt v/u.

## 2025-02-04 NOTE — H&P
"[facility]  Obstetric History and Physical    Referring Provider: Tiana Gonzáles MD      Chief Complaint   Patient presents with    Decreased Fetal Movement       Subjective     Patient is a 39 y.o. female  currently at 32w4d, who presents with complaint decreased fetal movement.  Patient reports decreased fetal movement today in the setting of an AMA, and recent diagnosis of type a influenza.  Denies productive cough, shortness of breath, leaking of fluid, vaginal bleeding, recent trauma.  Arrival to triage patient reports normal fetal activity.    .    The following portions of the patients history were reviewed and updated as appropriate: current medications, allergies, past medical history, past surgical history, past family history, past social history, and problem list .       Prenatal Information:   Maternal Prenatal Labs  Blood Type No results found for: \"ABO\"   Rh Status No results found for: \"RH\"   Antibody Screen No results found for: \"ABSCRN\"   Gonnorhea No results found for: \"GCCX\"   Chlamydia No results found for: \"CLAMYDCU\"   RPR No results found for: \"RPR\"   Syphilis Antibody No results found for: \"SYPHILIS\"   Rubella No results found for: \"RUBELLAIGGIN\"   Hepatitis B Surface Antigen No results found for: \"HEPBSAG\"   HIV-1 Antibody No results found for: \"LABHIV1\"   Hepatitis C Antibody No results found for: \"HEPCAB\"   Rapid Urin Drug Screen No results found for: \"AMPMETHU\", \"BARBITSCNUR\", \"LABBENZSCN\", \"LABMETHSCN\", \"LABOPIASCN\", \"THCURSCR\", \"COCAINEUR\", \"AMPHETSCREEN\", \"PROPOXSCN\", \"BUPRENORSCNU\", \"METAMPSCNUR\", \"OXYCODONESCN\", \"TRICYCLICSCN\"   Group B Strep Culture No results found for: \"GBSANTIGEN\"           External Prenatal Results       Pregnancy Outside Results - Transcribed From Office Records - See Scanned Records For Details       Test Value Date Time    ABO  AB  01/10/25 0235    Rh  Positive  01/10/25 0235    Antibody Screen  Negative  01/10/25 0235       Negative  25 " 0943       Negative  08/15/24 1035    Varicella IgG       Rubella  1.86 index 08/15/24 1035    Hgb  11.8 g/dL 25 1013       11.7 g/dL 01/10/25 0235       11.2 g/dL 25 0943       11.4 g/dL 24       12.2 g/dL 08/15/24 1035       12.9 g/dL 24 0827    Hct  36.0 % 25 1013       33.6 % 01/10/25 0235       34.2 % 25 0943       33.4 % 24       37.5 % 08/15/24 1035       37.9 % 24 0827    HgB A1c   4.90 % 08/15/24 1035    1h GTT  48 mg/dL 25 0943       100 mg/dL 24 1133    3h GTT Fasting       3h GTT 1 hour       3h GTT 2 hour       3h GTT 3 hour        Gonorrhea (discrete)  Negative  08/15/24 1035    Chlamydia (discrete)  Negative  08/15/24 1035    RPR  Non Reactive  25 0943       Non Reactive  08/15/24 1035    Syphils cascade: TP-Ab (FTA)       TP-Ab       TP-Ab (EIA)       TPPA       HBsAg  Negative  08/15/24 1035    Herpes Simplex Virus PCR       Herpes Simplex VIrus Culture       HIV  Non Reactive  08/15/24 1035    Hep C RNA Quant PCR       Hep C Antibody  Non Reactive  08/15/24 1035    AFP       NIPT       Cystic Fibrosis ([place])       Cystic Fibroisis        Spinal Muscular atrophy       Fragile X       Group B Strep       GBS Susceptibility to Clindamycin       GBS Susceptibility to Erythromycin       Fetal Fibronectin       Genetic Testing, Maternal Blood                 Drug Screening       Test Value Date Time    Urine Drug Screen       Amphetamine Screen       Barbiturate Screen       Benzodiazepine Screen       Methadone Screen       Phencyclidine Screen       Opiates Screen       THC Screen       Cocaine Screen       Propoxyphene Screen       Buprenorphine Screen       Methamphetamine Screen       Oxycodone Screen       Tricyclic Antidepressants Screen                 Legend    ^: Historical                              Past OB History:       OB History    Para Term  AB Living   1 0 0 0 0 0   SAB IAB Ectopic Molar  Multiple Live Births   0 0 0 0 0 0      # Outcome Date GA Lbr Geovany/2nd Weight Sex Type Anes PTL Lv   1 Current                Past Medical History: Past Medical History:   Diagnosis Date    Abnormal Pap smear of cervix     Allergic rhinitis 2012    Anxiety     Asthma     controlled    C. difficile diarrhea     10/2021    Chronic back pain 2010    r/t trauma, failed back surgery x 2, denies NSAIDS/steroids/narcotics    Claustrophobia     Depression     Dyspepsia     Dyspnea on exertion     Eczema     on Dupixent    Fatigue     Gout     Migraine     Morbid obesity     Urinary tract infection       Past Surgical History Past Surgical History:   Procedure Laterality Date    BREAST BIOPSY  10/2019    CARPAL TUNNEL RELEASE Bilateral 2021    COLONOSCOPY  2021    GASTRIC SLEEVE LAPAROSCOPIC N/A 11/7/2023    Procedure: GASTRIC SLEEVE LAPAROSCOPIC WITH CytoguideINCI ROBOT WITH ESOPHAGOGASTRODUODENOSCOPY;  Surgeon: Ashley Leong MD;  Location:  [facility];  Service: Robotics - DaVinci;  Laterality: N/A;    LUMBAR DISCECTOMY Left 12/23/2021    Procedure: LUMBAR MICRODISCECTOMY L5-S1;  Surgeon: James Estrada MD;  Location:  [facility];  Service: Neurosurgery;  Laterality: Left;    MOUTH SURGERY  2007    Gum Grafts    TONSILLECTOMY AND ADENOIDECTOMY  2021    TRIGGER FINGER RELEASE Right 2021    WISDOM TOOTH EXTRACTION  2007      Family History: Family History   Problem Relation Age of Onset    Melanoma Father     Drug abuse Father     Breast cancer Mother     Colon cancer Mother     Stroke Maternal Grandfather     Diabetes Maternal Grandfather     Arthritis Maternal Grandfather     Breast cancer Maternal Aunt     Diabetes Maternal Aunt     Ovarian cancer Neg Hx     Uterine cancer Neg Hx     Prostate cancer Neg Hx     Deep vein thrombosis Neg Hx     Pulmonary embolism Neg Hx     Coronary artery disease Neg Hx     Osteoporosis Neg Hx     Hypertension Neg Hx       Social History:  reports that she has never smoked. She has  never been exposed to tobacco smoke. She has never used smokeless tobacco.   reports that she does not currently use alcohol after a past usage of about 1.0 standard drink of alcohol per week.   reports no history of drug use.                   General ROS Negative Findings:Headaches, Visual Changes, Epigastric pain, Anorexia, Nausia/Vomiting, ROM, and Vaginal Bleeding    ROS     All other systems have been reviewed and are neg  Objective       Vital Signs Range for the last 24 hours  Temperature: Temp:  [98 °F (36.7 °C)] 98 °F (36.7 °C)   Temp Source: Temp src: Axillary   BP: BP: (102-141)/(61-94) 102/61   Pulse:     Respirations: Resp:  [20] 20   SPO2:     O2 Amount (l/min):     O2 Devices     Weight:       Physical Examination:   General:   alert, appears stated age, and cooperative   Skin:   normal   HEENT:     Lungs:   clear to auscultation bilaterally   Heart:      Gastrointestinal: Abdomen soft, gravid uterus, guarding benign exam   Lower Extremities: No edema, no calf tenderness.   :    Musculoskeletal:     Neuro: No focal deficits noted.               Fetal Heart Rate Assessment   Method: Fetal HR Assessment Method: external   Beats/min:     Baseline:     Varibility:     Accels:     Decels:     Tracing Category:     C-indication decreased fetal movement, interpretation reactive, moderate bili, accelerations present 15 x 15, no fetal deceleration noted, onset 24, end time 2100, no regular contractions noted  Uterine Assessment   Method: Method: external tocotransducer   Frequency (min):     Ctx Count in 10 min:     Duration:     Intensity:     Intensity by IUPC:     Resting Tone: Uterine Resting Tone: soft by palpation   Resting Tone by IUPC:     [place] Units:       Laboratory Results:   Lab Results (last 24 hours)       Procedure Component Value Units Date/Time    POC Protein, Urine, Qualitative, Dipstick [[id number]] Collected: 02/03/25 2029    Specimen: Urine Updated: 02/03/25 2030     Protein, POC  Negative mg/dL      Lot Number [id number]     Expiration Date 2026          Radiology Review:   Imaging Results (Last 24 Hours)       ** No results found for the last 24 hours. **          Other Studies: Bedside ultrasound revealed single IUP vertex presentation, biophysical profile 8 out of 8.  Vertical pocket 7 cm.  Fetus active    Assessment & Plan       Decreased fetal movement    AMA (advanced maternal age) multigravida 35+    Obesity in pregnancy, antepartum    History of gastric surgery    Primigravida of advanced maternal age in second trimester        Assessment:  1.  Intrauterine pregnancy at 32w4d gestation with reactive fetal status.    2.  Decreased fetal movement-resolved  3.  Type A influenza without hypoxemia  4.  Maternal and fetal wellbeing established.    Plan:  1.  Home, kick count,  labor instructions given, follow-up later in the week for scheduled PDC ultrasound return as needed  2. Plan of care has been reviewed with patient.  3.  Risks, benefits of treatment plan have been discussed.  4.  All questions have been answered.  5      Solitario Franco, DO  2/3/2025  21:28 EST

## 2025-02-05 PROBLEM — O36.8190 DECREASED FETAL MOVEMENT: Status: RESOLVED | Noted: 2025-02-03 | Resolved: 2025-02-05

## 2025-02-05 PROBLEM — O09.512 PRIMIGRAVIDA OF ADVANCED MATERNAL AGE IN SECOND TRIMESTER: Status: RESOLVED | Noted: 2024-10-10 | Resolved: 2025-02-05

## 2025-02-06 ENCOUNTER — ROUTINE PRENATAL (OUTPATIENT)
Dept: OBSTETRICS AND GYNECOLOGY | Facility: CLINIC | Age: 40
End: 2025-02-06
Payer: COMMERCIAL

## 2025-02-06 ENCOUNTER — HOSPITAL ENCOUNTER (OUTPATIENT)
Dept: WOMENS IMAGING | Facility: HOSPITAL | Age: 40
Discharge: HOME | End: 2025-02-06
Admitting: OBSTETRICS & GYNECOLOGY
Payer: COMMERCIAL

## 2025-02-06 ENCOUNTER — OFFICE VISIT (OUTPATIENT)
Dept: OBSTETRICS AND GYNECOLOGY | Facility: HOSPITAL | Age: 40
End: 2025-02-06
Payer: COMMERCIAL

## 2025-02-06 VITALS — BODY MASS INDEX: 36.53 KG/M2 | DIASTOLIC BLOOD PRESSURE: 74 MMHG | WEIGHT: 206.2 LBS | SYSTOLIC BLOOD PRESSURE: 114 MMHG

## 2025-02-06 VITALS — DIASTOLIC BLOOD PRESSURE: 74 MMHG | BODY MASS INDEX: 36.6 KG/M2 | SYSTOLIC BLOOD PRESSURE: 122 MMHG | WEIGHT: 206.6 LBS

## 2025-02-06 DIAGNOSIS — O44.00 PLACENTA PREVIA ANTEPARTUM: ICD-10-CM

## 2025-02-06 DIAGNOSIS — Z34.90 PREGNANCY, UNSPECIFIED GESTATIONAL AGE: ICD-10-CM

## 2025-02-06 DIAGNOSIS — O99.210 OBESITY IN PREGNANCY, ANTEPARTUM: ICD-10-CM

## 2025-02-06 DIAGNOSIS — O09.529 ANTEPARTUM MULTIGRAVIDA OF ADVANCED MATERNAL AGE: ICD-10-CM

## 2025-02-06 DIAGNOSIS — O09.523 MULTIGRAVIDA OF ADVANCED MATERNAL AGE IN THIRD TRIMESTER: ICD-10-CM

## 2025-02-06 DIAGNOSIS — O36.5930 POOR FETAL GROWTH AFFECTING MANAGEMENT OF MOTHER IN THIRD TRIMESTER, SINGLE OR UNSPECIFIED FETUS: ICD-10-CM

## 2025-02-06 DIAGNOSIS — Z98.890 HISTORY OF GASTRIC SURGERY: Primary | ICD-10-CM

## 2025-02-06 DIAGNOSIS — Z34.90 PRENATAL CARE, ANTEPARTUM: ICD-10-CM

## 2025-02-06 DIAGNOSIS — O44.43 LOW-LYING PLACENTA WITHOUT HEMORRHAGE, THIRD TRIMESTER: Primary | ICD-10-CM

## 2025-02-06 DIAGNOSIS — Z98.890 HISTORY OF GASTRIC SURGERY: ICD-10-CM

## 2025-02-06 DIAGNOSIS — O44.43 LOW-LYING PLACENTA WITHOUT HEMORRHAGE, THIRD TRIMESTER: ICD-10-CM

## 2025-02-06 LAB
GLUCOSE UR STRIP-MCNC: NEGATIVE MG/DL
PROT UR STRIP-MCNC: NEGATIVE MG/DL

## 2025-02-06 PROCEDURE — 76816 OB US FOLLOW-UP PER FETUS: CPT

## 2025-02-06 PROCEDURE — 76819 FETAL BIOPHYS PROFIL W/O NST: CPT

## 2025-02-06 PROCEDURE — 76820 UMBILICAL ARTERY ECHO: CPT

## 2025-02-06 NOTE — ASSESSMENT & PLAN NOTE
Growth today reveals overall EFW at 15%ile, though AC at 3%ile giving diagnosis of growth restriction. Reassuringly the amniotic fluid and umbilical artery cord Doppler are both normal today.  BPP is also normal today.    We discussed the various etiologies for growth restriction including: Infection, genetic factors such as aneuploidy, placental abnormalities, constitutional, and maternal vascular/autoimmune disease. No sonographic markers for infection were seen today. No congenital anomalies have been noted. Patient has previously low risk NIPT. We discussed how management of growth restriction is essentially the same regardless of etiology with close fetal monitoring. Plan for follow up in 1 weeks. We discussed potential improvement with increased nutrition, decreased activity and smoking cessation.  Patient with multiple risk factors for poor fetal growth including advanced maternal age and previous gastric surgery.  Would recommend weekly nonstress test in your office.  Would recommend delivery during the 37th-week of gestation.  We discussed that if growth has improved in 2 weeks that delivery may be able to be delayed.  We discussed careful precautions regarding decreased fetal movement.

## 2025-02-06 NOTE — LETTER
2025     CARLTON Ruggiero  [address]  [address]  [place] [address]    Patient: Brenda Trejo   YOB: 1985   Date of Visit: 2025       Dear CARLTON Ruggiero,    Thank you for referring Brenda Trejo to me for evaluation. Below is a copy of my consult note.    If you have questions, please do not hesitate to call me. I look forward to following Brenda along with you.         Sincerely,        Diomedes Orellana MD        CC: No Recipients    Patient denies any leaking fluid, vaginal bleeding, or contractions.  NIPT negative.  Patient reports next follow-up appointment with Dr. Gonzáles's office is today.          Maternal/Fetal Medicine Consult Note   Date: 2025  Name: Brenda Trejo    : 1985     MRN: [id number]     Referring Provider: CARLTON Ruggiero    Chief Complaint  AMA- primip, low lying placenta, gastric sleeve    Subjective     History of Present Illness:  Brenda Trejo is a 39 y.o.  33w0d who presents today for AMA and low-lying placenta    LISHA: Estimated Date of Delivery: 3/27/25     ROS:   Otherwise Noted in HPI      Current Outpatient Medications:   •  BABY ASPIRIN PO, Take  by mouth., Disp: , Rfl:   •  Calcium 250 MG capsule, Take  by mouth., Disp: , Rfl:   •  Cholecalciferol 1.25 MG (48039 UT) tablet, Take 1 tablet by mouth 1 (One) Time Per Week., Disp: 12 tablet, Rfl: 0  •  famotidine (PEPCID) 20 MG tablet, Take 1 tablet by mouth 2 (Two) Times a Day. (Patient taking differently: Take 1 tablet by mouth As Needed.), Disp: 20 tablet, Rfl: 0  •  ferrous sulfate 325 (65 FE) MG tablet, Take 1 tablet by mouth Daily With Breakfast., Disp: , Rfl:   •  multivitamin with minerals tablet tablet, Take 1 tablet by mouth Daily., Disp: , Rfl:   •  Omega-3 Fatty Acids (fish oil) 1000 MG capsule capsule, Take  by mouth Daily With Breakfast., Disp: , Rfl:   •  ondansetron ODT (ZOFRAN-ODT) 4 MG disintegrating tablet, Take 1 tablet by mouth Every 8  "(Eight) Hours As Needed for Nausea or Vomiting., Disp: 30 tablet, Rfl: 1  •  Prenatal Vit-Fe Fumarate-FA (prenatal vitamin 27-0.8) 27-0.8 MG tablet tablet, Take  by mouth Daily., Disp: , Rfl:   •  promethazine (PHENERGAN) 12.5 MG tablet, Take 1 tablet by mouth Every 6 (Six) Hours As Needed for Nausea or Vomiting., Disp: 20 tablet, Rfl: 0  •  sucralfate (CARAFATE) 1 g tablet, Take 1 tablet by mouth 4 (Four) Times a Day Before Meals & at Bedtime. (Patient taking differently: Take 1 tablet by mouth As Needed.), Disp: 60 tablet, Rfl: 0  •  thiamine (VITAMIN B-1) 100 MG tablet  tablet, Take 1 tablet by mouth Daily., Disp: , Rfl:   •  vitamin B-12 (CYANOCOBALAMIN) 500 MCG tablet, Take 1 tablet by mouth Daily., Disp: , Rfl:   •  vitamin B-6 (PYRIDOXINE) 50 MG tablet, Take 1 tablet by mouth Daily., Disp: , Rfl:     Objective     Vital Signs  /74   Wt 93.5 kg (206 lb 3.2 oz)   LMP 2024 (Exact Date)   Estimated body mass index is 36.53 kg/m² as calculated from the following:    Height as of 1/10/25: 160 cm (63\").    Weight as of this encounter: 93.5 kg (206 lb 3.2 oz).    Ultrasound Impression:   See Viewpoint     Assessment and Plan     Brenda Trejo is a 39 y.o.  33w0d who presents today for AMA and low-lying placenta    Diagnoses and all orders for this visit:    1. Low-lying placenta without hemorrhage, third trimester (Primary)  Assessment & Plan:  Posterior low-lying placenta has now resolved.  Measuring 3.5 cm away from cervix.      2. Poor fetal growth affecting management of mother in third trimester, single or unspecified fetus  Assessment & Plan:  Growth today reveals overall EFW at 15%ile, though AC at 3%ile giving diagnosis of growth restriction. Reassuringly the amniotic fluid and umbilical artery cord Doppler are both normal today.  BPP is also normal today.    We discussed the various etiologies for growth restriction including: Infection, genetic factors such as aneuploidy, placental " abnormalities, constitutional, and maternal vascular/autoimmune disease. No sonographic markers for infection were seen today. No congenital anomalies have been noted. Patient has previously low risk NIPT. We discussed how management of growth restriction is essentially the same regardless of etiology with close fetal monitoring. Plan for follow up in 1 weeks. We discussed potential improvement with increased nutrition, decreased activity and smoking cessation.  Patient with multiple risk factors for poor fetal growth including advanced maternal age and previous gastric surgery.  Would recommend weekly nonstress test in your office.  Would recommend delivery during the 37th-week of gestation.  We discussed that if growth has improved in 2 weeks that delivery may be able to be delayed.  We discussed careful precautions regarding decreased fetal movement.             Follow Up  1 week.     I spent 10 minutes caring for the patient on the day of service. This included: obtaining or reviewing a separately obtained medical history, reviewing patient records, performing a medically appropriate exam and/or evaluation, counseling or educating the patient/family/caregiver, ordering medications, labs, and/or procedures and documenting such in the medical record. This does not include time spent on review and interpretation of other tests such as fetal ultrasound or the performance of other procedures such as amniocentesis or CVS.      Diomedes Orellana MD, FACOG  Maternal Fetal Medicine, [facility] [place]

## 2025-02-06 NOTE — PROGRESS NOTES
Maternal/Fetal Medicine Consult Note   Date: 2025  Name: Brenda Trejo    : 1985     MRN: [id number]     Referring Provider: CARLTON Ruggiero    Chief Complaint  AMA- primip, low lying placenta, gastric sleeve    Subjective     History of Present Illness:  Brenda Trejo is a 39 y.o.  33w0d who presents today for AMA and low-lying placenta    LISHA: Estimated Date of Delivery: 3/27/25     ROS:   Otherwise Noted in HPI      Current Outpatient Medications:     BABY ASPIRIN PO, Take  by mouth., Disp: , Rfl:     Calcium 250 MG capsule, Take  by mouth., Disp: , Rfl:     Cholecalciferol 1.25 MG (51859 UT) tablet, Take 1 tablet by mouth 1 (One) Time Per Week., Disp: 12 tablet, Rfl: 0    famotidine (PEPCID) 20 MG tablet, Take 1 tablet by mouth 2 (Two) Times a Day. (Patient taking differently: Take 1 tablet by mouth As Needed.), Disp: 20 tablet, Rfl: 0    ferrous sulfate 325 (65 FE) MG tablet, Take 1 tablet by mouth Daily With Breakfast., Disp: , Rfl:     multivitamin with minerals tablet tablet, Take 1 tablet by mouth Daily., Disp: , Rfl:     Omega-3 Fatty Acids (fish oil) 1000 MG capsule capsule, Take  by mouth Daily With Breakfast., Disp: , Rfl:     ondansetron ODT (ZOFRAN-ODT) 4 MG disintegrating tablet, Take 1 tablet by mouth Every 8 (Eight) Hours As Needed for Nausea or Vomiting., Disp: 30 tablet, Rfl: 1    Prenatal Vit-Fe Fumarate-FA (prenatal vitamin 27-0.8) 27-0.8 MG tablet tablet, Take  by mouth Daily., Disp: , Rfl:     promethazine (PHENERGAN) 12.5 MG tablet, Take 1 tablet by mouth Every 6 (Six) Hours As Needed for Nausea or Vomiting., Disp: 20 tablet, Rfl: 0    sucralfate (CARAFATE) 1 g tablet, Take 1 tablet by mouth 4 (Four) Times a Day Before Meals & at Bedtime. (Patient taking differently: Take 1 tablet by mouth As Needed.), Disp: 60 tablet, Rfl: 0    thiamine (VITAMIN B-1) 100 MG tablet  tablet, Take 1 tablet by mouth Daily., Disp: , Rfl:     vitamin B-12 (CYANOCOBALAMIN) 500 MCG  "tablet, Take 1 tablet by mouth Daily., Disp: , Rfl:     vitamin B-6 (PYRIDOXINE) 50 MG tablet, Take 1 tablet by mouth Daily., Disp: , Rfl:     Objective     Vital Signs  /74   Wt 93.5 kg (206 lb 3.2 oz)   LMP 2024 (Exact Date)   Estimated body mass index is 36.53 kg/m² as calculated from the following:    Height as of 1/10/25: 160 cm (63\").    Weight as of this encounter: 93.5 kg (206 lb 3.2 oz).    Ultrasound Impression:   See Viewpoint     Assessment and Plan     Brenda Trejo is a 39 y.o.  33w0d who presents today for AMA and low-lying placenta    Diagnoses and all orders for this visit:    1. Low-lying placenta without hemorrhage, third trimester (Primary)  Assessment & Plan:  Posterior low-lying placenta has now resolved.  Measuring 3.5 cm away from cervix.      2. Poor fetal growth affecting management of mother in third trimester, single or unspecified fetus  Assessment & Plan:  Growth today reveals overall EFW at 15%ile, though AC at 3%ile giving diagnosis of growth restriction. Reassuringly the amniotic fluid and umbilical artery cord Doppler are both normal today.  BPP is also normal today.    We discussed the various etiologies for growth restriction including: Infection, genetic factors such as aneuploidy, placental abnormalities, constitutional, and maternal vascular/autoimmune disease. No sonographic markers for infection were seen today. No congenital anomalies have been noted. Patient has previously low risk NIPT. We discussed how management of growth restriction is essentially the same regardless of etiology with close fetal monitoring. Plan for follow up in 1 weeks. We discussed potential improvement with increased nutrition, decreased activity and smoking cessation.  Patient with multiple risk factors for poor fetal growth including advanced maternal age and previous gastric surgery.  Would recommend weekly nonstress test in your office.  Would recommend delivery during " the 37th-week of gestation.  We discussed that if growth has improved in 2 weeks that delivery may be able to be delayed.  We discussed careful precautions regarding decreased fetal movement.             Follow Up  1 week.     I spent 10 minutes caring for the patient on the day of service. This included: obtaining or reviewing a separately obtained medical history, reviewing patient records, performing a medically appropriate exam and/or evaluation, counseling or educating the patient/family/caregiver, ordering medications, labs, and/or procedures and documenting such in the medical record. This does not include time spent on review and interpretation of other tests such as fetal ultrasound or the performance of other procedures such as amniocentesis or CVS.      Diomedes Orellana MD, FACOG  Maternal Fetal Medicine, [facility] [place]

## 2025-02-10 ENCOUNTER — ROUTINE PRENATAL (OUTPATIENT)
Dept: OBSTETRICS AND GYNECOLOGY | Facility: CLINIC | Age: 40
End: 2025-02-10
Payer: COMMERCIAL

## 2025-02-10 VITALS — WEIGHT: 209 LBS | SYSTOLIC BLOOD PRESSURE: 122 MMHG | DIASTOLIC BLOOD PRESSURE: 72 MMHG | BODY MASS INDEX: 37.02 KG/M2

## 2025-02-10 DIAGNOSIS — O99.210 OBESITY IN PREGNANCY, ANTEPARTUM: ICD-10-CM

## 2025-02-10 DIAGNOSIS — O36.5930 POOR FETAL GROWTH AFFECTING MANAGEMENT OF MOTHER IN THIRD TRIMESTER, SINGLE OR UNSPECIFIED FETUS: ICD-10-CM

## 2025-02-10 DIAGNOSIS — O09.529 ANTEPARTUM MULTIGRAVIDA OF ADVANCED MATERNAL AGE: ICD-10-CM

## 2025-02-10 DIAGNOSIS — Z34.90 PRENATAL CARE, ANTEPARTUM: Primary | ICD-10-CM

## 2025-02-10 DIAGNOSIS — Z98.890 HISTORY OF GASTRIC SURGERY: ICD-10-CM

## 2025-02-10 DIAGNOSIS — O44.00 PLACENTA PREVIA ANTEPARTUM: ICD-10-CM

## 2025-02-10 PROBLEM — O36.8130 DECREASED FETAL MOVEMENT AFFECTING MANAGEMENT OF PREGNANCY IN THIRD TRIMESTER, SINGLE OR UNSPECIFIED FETUS: Status: RESOLVED | Noted: 2025-01-10 | Resolved: 2025-02-10

## 2025-02-10 LAB
GLUCOSE UR STRIP-MCNC: NEGATIVE MG/DL
PROT UR STRIP-MCNC: NEGATIVE MG/DL

## 2025-02-10 NOTE — PROGRESS NOTES
OB FOLLOW UP  CC- Here for care of pregnancy        Brenda Trejo is a 39 y.o.  33w4d patient being seen today for her obstetrical follow up visit. Patient reports no complaints.     Her prenatal care is complicated by (and status) : see below.  Patient Active Problem List   Diagnosis    Family history of breast cancer in mother    Lumbar herniated disc    Eczema    Chronic back pain    Anxiety    Asthma    AMA (advanced maternal age) multigravida 35+    Prenatal care    Obesity in pregnancy, antepartum    History of gastric surgery    Hypokalemia    Hypomagnesemia    Poor fetal growth affecting management of mother in third trimester    Maternal care for breech presentation, single gestation       Flu Status: Already given in current flu season  Ultrasound Today: No  Non Stress Test: Yes minutes 20  non-stress test: NST: Reactive  indication: Fetal Growth Restriction (IUGR)    ROS -   Patient Denies: Loss of Fluid, Vaginal Spotting, Vision Changes, Headaches, Nausea , Vomiting , Contractions, Epigastric pain, and skin itching  Fetal Movement : normal  Other than what is documented in the HPI, all other systems reviewed and are negative.       The additional following portions of the patient's history were reviewed and updated as appropriate: allergies and current medications.    I have reviewed and agree with the HPI, ROS, and historical information as entered above. Tiana Gonzáles MD      /72   Wt 94.8 kg (209 lb)   LMP 2024 (Exact Date)   BMI 37.02 kg/m²       EXAM:     Prenatal Vitals  BP: 122/72  Weight: 94.8 kg (209 lb)   Fetal Heart Rate: NST               Urine Glucose Read-only: Negative  Urine Protein Read-only: Negative           Assessment and Plan    Problem List Items Addressed This Visit          Gastrointestinal Abdominal     History of gastric surgery    Overview     History of gastric sleeve in             Gravid and     AMA (advanced maternal  age) multigravida 35+    Overview     cfDNA negative.  Boy!         Prenatal care - Primary    Relevant Orders    POC Urinalysis Dipstick (Completed)    Obesity in pregnancy, antepartum    Overview     Hgb A1C at NOB 4.9  Early 1hr gtt at 2nd visit 100  Discussed carbohydrate control  Recommend limiting weight gain to 15-20lbs             Poor fetal growth affecting management of mother in third trimester    Overview     Plan for weekly Dopplers with PDC on Thursdays and NSTs with us on Mondays.  Plan for delivery at 37 weeks.  Patient was breech at 33 weeks.  Will plan delivery based on position as we get closer.         Maternal care for breech presentation, single gestation    Overview     At 33 weeks.  Will reassess at 34.         RESOLVED: Placenta previa antepartum    Overview     11/7 PDC- Posterior placenta previa with normal cervical length   1/2/25 PDC- Placenta previa appears to be resolving and is now a low-lying placenta, 2.6cm away from internal os. I would expect that the placenta will be far enough away from the cervix to allow for a vaginal delivery.  In order to be certain we will rescan the patient again in 6 weeks time.             Pregnancy at 33w4d  Fetal status reassuring.  NST reactive  Activity and Exercise discussed.  Fetal movement/PTL or Labor precautions  PDC to perform ultrasound in 3 days.  Return in about 1 week (around 2/17/2025) for nst.    Tiana Gonzáles MD  02/10/2025

## 2025-02-11 ENCOUNTER — TELEPHONE (OUTPATIENT)
Dept: OBSTETRICS AND GYNECOLOGY | Facility: CLINIC | Age: 40
End: 2025-02-11
Payer: COMMERCIAL

## 2025-02-11 ENCOUNTER — HOSPITAL ENCOUNTER (OUTPATIENT)
Facility: HOSPITAL | Age: 40
Discharge: HOME | End: 2025-02-11
Attending: OBSTETRICS & GYNECOLOGY | Admitting: OBSTETRICS & GYNECOLOGY
Payer: COMMERCIAL

## 2025-02-11 VITALS
OXYGEN SATURATION: 98 % | HEART RATE: 69 BPM | DIASTOLIC BLOOD PRESSURE: 67 MMHG | RESPIRATION RATE: 17 BRPM | TEMPERATURE: 98.6 F | SYSTOLIC BLOOD PRESSURE: 119 MMHG

## 2025-02-11 PROBLEM — O36.8190 DECREASED FETAL MOVEMENT: Status: ACTIVE | Noted: 2025-02-11

## 2025-02-11 LAB — POC AMNISURE: NEGATIVE

## 2025-02-11 PROCEDURE — G0463 HOSPITAL OUTPT CLINIC VISIT: HCPCS

## 2025-02-11 PROCEDURE — 84112 EVAL AMNIOTIC FLUID PROTEIN: CPT | Performed by: OBSTETRICS & GYNECOLOGY

## 2025-02-11 PROCEDURE — 59025 FETAL NON-STRESS TEST: CPT

## 2025-02-11 NOTE — TELEPHONE ENCOUNTER
Provider:  DR DEAN    Caller: DEREK THORNTON    Phone Number:[phone]     Reason for Call: PATIENT IS CALLING WITH CONCERNS BECAUSE TODAYS SHE HAS BEEN CRAMPING, NOTICED THAT HER PANTIES ARE STAYING WET, AND LACK OF FETAL MOVEMENT TODAY//SHE IS CURRENTLY ON HER WAY HOME FROM [facility] AND NEEDS TO KNOW WHAT SHE SHOULD DO

## 2025-02-11 NOTE — TELEPHONE ENCOUNTER
S/w pt she states her daughter had a procedure in [place] today and earlier she had some LOF in her panties there was wet and fluid like and states it was not like vaginal discharge, denies vaginal odor. Patient states fetal movement has been decreased today as well, not as frequent as usual. Patient states they are still in [place] but are currently on their way back and wants to know what we recommend her do.     I told the patient to go to nearest L&D for further evaluation for possible LOF, monitor baby.     She v/u and states she will do that and she denies: vaginal bleeding, HA's, vision changes, severe pelvic pain/cramping

## 2025-02-12 NOTE — H&P
"[facility]  Obstetric History and Physical    Referring Provider: Tiana Gonzáles MD      Cc: Decreased movement and possible rupture membranes.    Subjective     Patient is a 39 y.o. female  currently at 33w5d, who presents with decreased fetal movement today with possible rupture membranes.  Patient reports some increased vaginal discharge today without vaginal bleeding, recent trauma, fever, any other concerns.  Course complicated by recent type B influenza, AMA, obesity, history of gastric surgery, and recent diagnosed with IUGR    .    The following portions of the patients history were reviewed and updated as appropriate: current medications, allergies, past medical history, past surgical history, past family history, past social history, and problem list .       Prenatal Information:   Maternal Prenatal Labs  Blood Type No results found for: \"ABO\"   Rh Status No results found for: \"RH\"   Antibody Screen No results found for: \"ABSCRN\"   Gonnorhea No results found for: \"GCCX\"   Chlamydia No results found for: \"CLAMYDCU\"   RPR No results found for: \"RPR\"   Syphilis Antibody No results found for: \"SYPHILIS\"   Rubella No results found for: \"RUBELLAIGGIN\"   Hepatitis B Surface Antigen No results found for: \"HEPBSAG\"   HIV-1 Antibody No results found for: \"LABHIV1\"   Hepatitis C Antibody No results found for: \"HEPCAB\"   Rapid Urin Drug Screen No results found for: \"AMPMETHU\", \"BARBITSCNUR\", \"LABBENZSCN\", \"LABMETHSCN\", \"LABOPIASCN\", \"THCURSCR\", \"COCAINEUR\", \"AMPHETSCREEN\", \"PROPOXSCN\", \"BUPRENORSCNU\", \"METAMPSCNUR\", \"OXYCODONESCN\", \"TRICYCLICSCN\"   Group B Strep Culture No results found for: \"GBSANTIGEN\"           External Prenatal Results       Pregnancy Outside Results - Transcribed From Office Records - See Scanned Records For Details       Test Value Date Time    ABO  AB  01/10/25 0235    Rh  Positive  01/10/25 0235    Antibody Screen  Negative  01/10/25 0235       Negative  25 0943       " Negative  08/15/24 1035    Varicella IgG       Rubella  1.86 index 08/15/24 1035    Hgb  11.8 g/dL 25 1013       11.7 g/dL 01/10/25 0235       11.2 g/dL 25 0943       11.4 g/dL 24       12.2 g/dL 08/15/24 103       12.9 g/dL 24 0827    Hct  36.0 % 25 1013       33.6 % 01/10/25 0235       34.2 % 25 0943       33.4 % 24       37.5 % 08/15/24 103       37.9 % 24 0827    HgB A1c   4.90 % 08/15/24 1035    1h GTT  48 mg/dL 25 0943       100 mg/dL 24 1133    3h GTT Fasting       3h GTT 1 hour       3h GTT 2 hour       3h GTT 3 hour        Gonorrhea (discrete)  Negative  08/15/24 1035    Chlamydia (discrete)  Negative  08/15/24 1035    RPR  Non Reactive  25 0943       Non Reactive  08/15/24 1035    Syphils cascade: TP-Ab (FTA)       TP-Ab       TP-Ab (EIA)       TPPA       HBsAg  Negative  08/15/24 1035    Herpes Simplex Virus PCR       Herpes Simplex VIrus Culture       HIV  Non Reactive  08/15/24 1035    Hep C RNA Quant PCR       Hep C Antibody  Non Reactive  08/15/24 1035    AFP       NIPT       Cystic Fibrosis ([place])       Cystic Fibroisis        Spinal Muscular atrophy       Fragile X       Group B Strep       GBS Susceptibility to Clindamycin       GBS Susceptibility to Erythromycin       Fetal Fibronectin       Genetic Testing, Maternal Blood                 Drug Screening       Test Value Date Time    Urine Drug Screen       Amphetamine Screen       Barbiturate Screen       Benzodiazepine Screen       Methadone Screen       Phencyclidine Screen       Opiates Screen       THC Screen       Cocaine Screen       Propoxyphene Screen       Buprenorphine Screen       Methamphetamine Screen       Oxycodone Screen       Tricyclic Antidepressants Screen                 Legend    ^: Historical                              Past OB History:       OB History    Para Term  AB Living   1 0 0 0 0 0   SAB IAB Ectopic Molar Multiple Live  Births   0 0 0 0 0 0      # Outcome Date GA Lbr Geovany/2nd Weight Sex Type Anes PTL Lv   1 Current                Past Medical History: Past Medical History:   Diagnosis Date    Abnormal Pap smear of cervix     Allergic rhinitis 2012    Anxiety     Asthma     controlled    C. difficile diarrhea     10/2021    Chronic back pain 2010    r/t trauma, failed back surgery x 2, denies NSAIDS/steroids/narcotics    Claustrophobia     Depression     Dyspepsia     Dyspnea on exertion     Eczema     on Dupixent    Fatigue     Gout     Migraine     Morbid obesity     Urinary tract infection       Past Surgical History Past Surgical History:   Procedure Laterality Date    BREAST BIOPSY  10/2019    CARPAL TUNNEL RELEASE Bilateral 2021    COLONOSCOPY  2021    GASTRIC SLEEVE LAPAROSCOPIC N/A 11/7/2023    Procedure: GASTRIC SLEEVE LAPAROSCOPIC WITH [place] WITH ESOPHAGOGASTRODUODENOSCOPY;  Surgeon: Ashley Leong MD;  Location:  [facility];  Service: Robotics - DaVinci;  Laterality: N/A;    LUMBAR DISCECTOMY Left 12/23/2021    Procedure: LUMBAR MICRODISCECTOMY L5-S1;  Surgeon: James Estrada MD;  Location:  [facility];  Service: Neurosurgery;  Laterality: Left;    MOUTH SURGERY  2007    Gum Grafts    TONSILLECTOMY AND ADENOIDECTOMY  2021    TRIGGER FINGER RELEASE Right 2021    WISDOM TOOTH EXTRACTION  2007      Family History: Family History   Problem Relation Age of Onset    Melanoma Father     Drug abuse Father     Breast cancer Mother     Colon cancer Mother     Stroke Maternal Grandfather     Diabetes Maternal Grandfather     Arthritis Maternal Grandfather     Breast cancer Maternal Aunt     Diabetes Maternal Aunt     Ovarian cancer Neg Hx     Uterine cancer Neg Hx     Prostate cancer Neg Hx     Deep vein thrombosis Neg Hx     Pulmonary embolism Neg Hx     Coronary artery disease Neg Hx     Osteoporosis Neg Hx     Hypertension Neg Hx       Social History:  reports that she has never smoked. She has never been  exposed to tobacco smoke. She has never used smokeless tobacco.   reports that she does not currently use alcohol after a past usage of about 1.0 standard drink of alcohol per week.   reports no history of drug use.                   General ROS Negative Findings:Headaches, Visual Changes, Epigastric pain, Anorexia, Nausia/Vomiting, ROM, and Vaginal Bleeding    ROS     All other systems have been reviewed and are neg  Objective       Vital Signs Range for the last 24 hours  Temperature: Temp:  [98.6 °F (37 °C)] 98.6 °F (37 °C)   Temp Source: Temp src: Oral   BP: BP: (119)/(67) 119/67   Pulse: Heart Rate:  [69] 69   Respirations: Resp:  [17] 17   SPO2: SpO2:  [98 %] 98 %   O2 Amount (l/min):     O2 Devices Device (Oxygen Therapy): room air   Weight:       Physical Examination:   General:   alert, appears stated age, and cooperative   Skin:   normal   HEENT:     Lungs:      Heart:      Gastrointestinal: Abdomen soft, gravid uterus nontender   Lower Extremities: No edema, no calf tenderness   :    Musculoskeletal:     Neuro:                 Fetal Heart Rate Assessment   Method: Fetal HR Assessment Method: external   Beats/min: Fetal HR (beats/min): 135   Baseline: Fetal HR Baseline: normal range   Varibility: Fetal HR Variability: moderate (amplitude range 6 to 25 bpm)   Accels: Fetal HR Accelerations: greater than/equal to 15 bpm   Decels: Fetal HR Decelerations: absent   Tracing Category:     NST-indications decreased fetal movement, interpretation reactive, moderate variability, accelerations present 15 x 15, no fetal decelerations noted, onset 1912 endtime 2012 no reg ctx  Uterine Assessment   Method: Method: external tocotransducer   Frequency (min):     Ctx Count in 10 min:     Duration:     Intensity: Contraction Intensity: no contractions   Intensity by IUPC:     Resting Tone:     Resting Tone by IUPC:     [place] Units:       Laboratory Results:   Lab Results (last 24 hours)       Procedure Component  Value Units Date/Time    POC Amnisure [[id number]]  (Normal) Collected: 25    Specimen: Amniotic Fluid Updated: 25     Amnisure Negative          Radiology Review:   Imaging Results (Last 24 Hours)       ** No results found for the last 24 hours. **          Other Studies: Bedside ultrasound Viel single IUP vertex presentation, fetus active, biophysical profile 8 out of 8, max vertical pocket 5.6 cm.    Assessment & Plan       Decreased fetal movement        Assessment:  1.  Intrauterine pregnancy at 33w5d gestation with reactive fetal status.    2.  Decreased fetal movement       Previously breech now vertex presentation  3.  Maternal and fetal wellbeing established  4.  IUGR    Plan:  1.  Discharge home, kick count,  labor instructions given.  Follow-up OB provider in 2 days for schedule appointment and continued twice-weekly testing until delivery.  2. Plan of care has been reviewed with patient.  3.  Risks, benefits of treatment plan have been discussed.  4.  All questions have been answered.  5      Solitario Franco DO  2025  20:19 EST

## 2025-02-13 ENCOUNTER — OFFICE VISIT (OUTPATIENT)
Dept: OBSTETRICS AND GYNECOLOGY | Facility: HOSPITAL | Age: 40
End: 2025-02-13
Payer: COMMERCIAL

## 2025-02-13 ENCOUNTER — HOSPITAL ENCOUNTER (OUTPATIENT)
Dept: WOMENS IMAGING | Facility: HOSPITAL | Age: 40
Discharge: HOME | End: 2025-02-13
Admitting: ADVANCED PRACTICE MIDWIFE
Payer: COMMERCIAL

## 2025-02-13 VITALS — SYSTOLIC BLOOD PRESSURE: 135 MMHG | DIASTOLIC BLOOD PRESSURE: 79 MMHG | BODY MASS INDEX: 37.02 KG/M2 | WEIGHT: 209 LBS

## 2025-02-13 DIAGNOSIS — O36.5930 POOR FETAL GROWTH AFFECTING MANAGEMENT OF MOTHER IN THIRD TRIMESTER, SINGLE OR UNSPECIFIED FETUS: Primary | ICD-10-CM

## 2025-02-13 DIAGNOSIS — Z34.90 PREGNANCY, UNSPECIFIED GESTATIONAL AGE: ICD-10-CM

## 2025-02-13 PROCEDURE — 76819 FETAL BIOPHYS PROFIL W/O NST: CPT

## 2025-02-13 PROCEDURE — 76820 UMBILICAL ARTERY ECHO: CPT

## 2025-02-13 NOTE — PROGRESS NOTES
Patient denies bleeding, leaking fluid or contractions  NIPT negative  Patients next follow up with Dr. Melendez office is 2/17/25

## 2025-02-17 ENCOUNTER — ROUTINE PRENATAL (OUTPATIENT)
Dept: OBSTETRICS AND GYNECOLOGY | Facility: CLINIC | Age: 40
End: 2025-02-17
Payer: COMMERCIAL

## 2025-02-17 VITALS — BODY MASS INDEX: 37.27 KG/M2 | WEIGHT: 210.4 LBS | DIASTOLIC BLOOD PRESSURE: 70 MMHG | SYSTOLIC BLOOD PRESSURE: 112 MMHG

## 2025-02-17 DIAGNOSIS — O36.5930 POOR FETAL GROWTH AFFECTING MANAGEMENT OF MOTHER IN THIRD TRIMESTER, SINGLE OR UNSPECIFIED FETUS: ICD-10-CM

## 2025-02-17 DIAGNOSIS — O09.523 MULTIGRAVIDA OF ADVANCED MATERNAL AGE IN THIRD TRIMESTER: ICD-10-CM

## 2025-02-17 DIAGNOSIS — O99.210 OBESITY IN PREGNANCY, ANTEPARTUM: ICD-10-CM

## 2025-02-17 DIAGNOSIS — Z34.93 PRENATAL CARE IN THIRD TRIMESTER: Primary | ICD-10-CM

## 2025-02-17 DIAGNOSIS — Z98.890 HISTORY OF GASTRIC SURGERY: ICD-10-CM

## 2025-02-17 PROBLEM — O36.8190 DECREASED FETAL MOVEMENT: Status: RESOLVED | Noted: 2025-02-11 | Resolved: 2025-02-17

## 2025-02-17 LAB
GLUCOSE UR STRIP-MCNC: NEGATIVE MG/DL
PROT UR STRIP-MCNC: NEGATIVE MG/DL

## 2025-02-17 NOTE — PROGRESS NOTES
OB FOLLOW UP  CC- Here for care of pregnancy        Brenda Trejo is a 39 y.o.  34w4d patient being seen today for her obstetrical follow up visit. Patient reports low pelvic cramping.     Her prenatal care is complicated by (and status) : see below.  Patient Active Problem List   Diagnosis    Family history of breast cancer in mother    Lumbar herniated disc    Eczema    Chronic back pain    Anxiety    Asthma    AMA (advanced maternal age) multigravida 35+    Prenatal care    Obesity in pregnancy, antepartum    History of gastric surgery    Hypokalemia    Hypomagnesemia    Poor fetal growth affecting management of mother in third trimester       Flu Status: Already given in current flu season  Ultrasound Today: No  Non Stress Test: Yes, 20 minutes   Indication: Fetal Growth Restriction (IUGR)  Reactive, Cat 1    ROS -   Patient Denies: Loss of Fluid, Vaginal Spotting, Vision Changes, Headaches, Nausea , Vomiting , Contractions, Epigastric pain, and skin itching  Fetal Movement : normal  Other than what is documented in the HPI, all other systems reviewed and are negative.       The additional following portions of the patient's history were reviewed and updated as appropriate: allergies and current medications.    I have reviewed and agree with the HPI, ROS, and historical information as entered above. Tiana Gonzáles MD      /70   Wt 95.4 kg (210 lb 6.4 oz)   LMP 2024 (Exact Date)   BMI 37.27 kg/m²       EXAM:     Prenatal Vitals  BP: 112/70  Weight: 95.4 kg (210 lb 6.4 oz)   Fetal Heart Rate: NST               Urine Glucose Read-only: Negative  Urine Protein Read-only: Negative           Assessment and Plan    Problem List Items Addressed This Visit          Gastrointestinal Abdominal     History of gastric surgery    Overview     History of gastric sleeve in             Gravid and     AMA (advanced maternal age) multigravida 35+    Overview     cfDNA negative.   Boy!         Prenatal care - Primary    Relevant Orders    POC Urinalysis Dipstick (Completed)    Obesity in pregnancy, antepartum    Overview     Hgb A1C at NOB 4.9  Early 1hr gtt at 2nd visit 100  Discussed carbohydrate control  Recommend limiting weight gain to 15-20lbs             Poor fetal growth affecting management of mother in third trimester    Overview     Plan for weekly Dopplers with PDC on Thursdays and NSTs with us on Mondays.  Plan for delivery at 37 weeks.  Patient was breech at 33 weeks.    IOL 3/6/2025 at 1 AM            Pregnancy at 34w4d  Fetal status reassuring.  NST reactive  PDC in 3 days  Activity and Exercise discussed.  Fetal movement/PTL or Labor precautions  GBS next visit  Return in about 1 week (around 2/24/2025).    Tiana Gonzáles MD  02/17/2025

## 2025-02-19 ENCOUNTER — TELEPHONE (OUTPATIENT)
Dept: OBSTETRICS AND GYNECOLOGY | Facility: CLINIC | Age: 40
End: 2025-02-19
Payer: COMMERCIAL

## 2025-02-19 NOTE — TELEPHONE ENCOUNTER
Per Dr. Gonzáles, pt to see her on Mondays and PDC only on Thursdays unless having problems. Pt denies any symptoms and problems and is okay with seeing PDC tomorrow and Dr. Gonzáles on Monday. Pt v/u.

## 2025-02-20 ENCOUNTER — HOSPITAL ENCOUNTER (OUTPATIENT)
Dept: WOMENS IMAGING | Facility: HOSPITAL | Age: 40
Discharge: HOME | End: 2025-02-20
Admitting: ADVANCED PRACTICE MIDWIFE
Payer: COMMERCIAL

## 2025-02-20 ENCOUNTER — OFFICE VISIT (OUTPATIENT)
Dept: OBSTETRICS AND GYNECOLOGY | Facility: HOSPITAL | Age: 40
End: 2025-02-20
Payer: COMMERCIAL

## 2025-02-20 VITALS — DIASTOLIC BLOOD PRESSURE: 77 MMHG | WEIGHT: 211.4 LBS | SYSTOLIC BLOOD PRESSURE: 133 MMHG | BODY MASS INDEX: 37.45 KG/M2

## 2025-02-20 DIAGNOSIS — O09.523 MULTIGRAVIDA OF ADVANCED MATERNAL AGE IN THIRD TRIMESTER: ICD-10-CM

## 2025-02-20 DIAGNOSIS — O36.5930 POOR FETAL GROWTH AFFECTING MANAGEMENT OF MOTHER IN THIRD TRIMESTER, SINGLE OR UNSPECIFIED FETUS: Primary | ICD-10-CM

## 2025-02-20 DIAGNOSIS — Z34.90 PREGNANCY, UNSPECIFIED GESTATIONAL AGE: ICD-10-CM

## 2025-02-20 PROCEDURE — 76816 OB US FOLLOW-UP PER FETUS: CPT

## 2025-02-20 PROCEDURE — 76820 UMBILICAL ARTERY ECHO: CPT

## 2025-02-20 PROCEDURE — 76819 FETAL BIOPHYS PROFIL W/O NST: CPT

## 2025-02-20 NOTE — ASSESSMENT & PLAN NOTE
Patient returns today for follow-up for pregnancy complicated by intrauterine growth restriction.  Ultrasound done on  demonstrated fetus at approximately the 15th percentile for overall growth with abdominal circumference at the 3rd percentile.  Patient reports no complications since her last visit and notes good fetal movement.    Ultrasound today demonstrates a fetus at approximately the 10th to the 11th percentile for overall growth with the abdominal circumference at the 3rd to the 4th percentile.  Amniotic fluid volume and umbilical artery Dopplers are normal.  BPP is 8 out of 8.    Patient continues with intrauterine growth restriction.  The condition is not worsening.  At this point we would recommend continued  testing.  We will see the patient again in 1 week's time perform BPP and Dopplers.  We would recommend delivery at 37 to 38 weeks gestation.    Patient was counseled extensively regarding fetal movement and will contact her provider immediately if she notices any decreased fetal movement.

## 2025-02-20 NOTE — PROGRESS NOTES
Patient denies any leaking fluid, vaginal bleeding, or contractions.  NIPT negative.  Patient reports next follow-up appointment with Dr. Gonzáles's office is 2/24.

## 2025-02-20 NOTE — LETTER
"2025     Tiana Gonzáles MD  [address]  [address]  [facility] [address]    Patient: Brenda Trejo   YOB: 1985   Date of Visit: 2025     Dear Tiana Gonzáles MD:       Thank you for referring Brenda Trejo to me for evaluation. Below are the relevant portions of my assessment and plan of care.    If you have questions, please do not hesitate to call me. I look forward to following Brenda along with you.         Sincerely,        Douglas A. Milligan, MD        CC: No Recipients    Milligan, Douglas A, MD  [phone]  Signed  Documentation of the ultrasound findings, images, and interpretations will be available in the patient's Viewpoint report which is located in the imaging tab in chart review.    Maternal/Fetal Medicine Follow Up  Note     Name: Brenda Trejo    : 1985     MRN: [id number]     Referring Provider: CARLTON Ruggiero    Chief Complaint  AMA- primip; hx gastric sleeve; S<D    Subjective     History of Present Illness:  Brenda Trejo is a 39 y.o.  35w0d who presents today for IUGR    LISHA: Estimated Date of Delivery: 3/27/25     ROS:   As noted in HPI.     Objective     Vital Signs  /77   Wt 95.9 kg (211 lb 6.4 oz)   LMP 2024 (Exact Date)   Estimated body mass index is 37.45 kg/m² as calculated from the following:    Height as of 1/10/25: 160 cm (63\").    Weight as of this encounter: 95.9 kg (211 lb 6.4 oz).    Physical Exam    Ultrasound Impression:   See Viewpoint    Assessment and Plan     Brenda Trejo is a 39 y.o.  35w0d who presents today for IUGR    Diagnoses and all orders for this visit:    1. Poor fetal growth affecting management of mother in third trimester, single or unspecified fetus (Primary)  Assessment & Plan:  Patient returns today for follow-up for pregnancy complicated by intrauterine growth restriction.  Ultrasound done on  demonstrated fetus at approximately the 15th " percentile for overall growth with abdominal circumference at the 3rd percentile.  Patient reports no complications since her last visit and notes good fetal movement.    Ultrasound today demonstrates a fetus at approximately the 10th to the 11th percentile for overall growth with the abdominal circumference at the 3rd to the 4th percentile.  Amniotic fluid volume and umbilical artery Dopplers are normal.  BPP is 8 out of 8.    Patient continues with intrauterine growth restriction.  The condition is not worsening.  At this point we would recommend continued  testing.  We will see the patient again in 1 week's time perform BPP and Dopplers.  We would recommend delivery at 37 to 38 weeks gestation.    Patient was counseled extensively regarding fetal movement and will contact her provider immediately if she notices any decreased fetal movement.    Orders:  -     [facility]  Diagnostic Center; Future    2. Multigravida of advanced maternal age in third trimester  -     [facility]  Diagnostic Center; Future    3. Pregnancy, unspecified gestational age  -     [facility]  Diagnostic Center; Future         Follow Up  Return in about 1 week (around 2025).    I spent 15 minutes caring for the patient on the day of service. This included: obtaining or reviewing a separately obtained medical history, reviewing patient records, performing a medically appropriate exam and/or evaluation, counseling or educating the patient/family/caregiver, ordering medications, labs, and/or procedures and documenting such in the medical record. This does not include time spent on review and interpretation of other tests such as fetal ultrasound or the performance of other procedures such as amniocentesis or CVS.      Douglas A. Milligan, MD  Maternal Fetal Medicine, [facility] [place]     2025

## 2025-02-20 NOTE — PROGRESS NOTES
"Documentation of the ultrasound findings, images, and interpretations will be available in the patient's Viewpoint report which is located in the imaging tab in chart review.    Maternal/Fetal Medicine Follow Up  Note     Name: Brenda Trejo    : 1985     MRN: [id number]     Referring Provider: CARLTON Ruggiero    Chief Complaint  AMA- primip; hx gastric sleeve; S<D    Subjective     History of Present Illness:  Brenda Trejo is a 39 y.o.  35w0d who presents today for IUGR    LISHA: Estimated Date of Delivery: 3/27/25     ROS:   As noted in HPI.     Objective     Vital Signs  /77   Wt 95.9 kg (211 lb 6.4 oz)   LMP 2024 (Exact Date)   Estimated body mass index is 37.45 kg/m² as calculated from the following:    Height as of 1/10/25: 160 cm (63\").    Weight as of this encounter: 95.9 kg (211 lb 6.4 oz).    Physical Exam    Ultrasound Impression:   See Viewpoint    Assessment and Plan     Brenda Trejo is a 39 y.o.  35w0d who presents today for IUGR    Diagnoses and all orders for this visit:    1. Poor fetal growth affecting management of mother in third trimester, single or unspecified fetus (Primary)  Assessment & Plan:  Patient returns today for follow-up for pregnancy complicated by intrauterine growth restriction.  Ultrasound done on  demonstrated fetus at approximately the 15th percentile for overall growth with abdominal circumference at the 3rd percentile.  Patient reports no complications since her last visit and notes good fetal movement.    Ultrasound today demonstrates a fetus at approximately the 10th to the 11th percentile for overall growth with the abdominal circumference at the 3rd to the 4th percentile.  Amniotic fluid volume and umbilical artery Dopplers are normal.  BPP is 8 out of 8.    Patient continues with intrauterine growth restriction.  The condition is not worsening.  At this point we would recommend continued  testing.  We " will see the patient again in 1 week's time perform BPP and Dopplers.  We would recommend delivery at 37 to 38 weeks gestation.    Patient was counseled extensively regarding fetal movement and will contact her provider immediately if she notices any decreased fetal movement.    Orders:  -     [facility]  Diagnostic Center; Future    2. Multigravida of advanced maternal age in third trimester  -     [facility]; Future    3. Pregnancy, unspecified gestational age  -     [facility]; Future         Follow Up  Return in about 1 week (around 2025).    I spent 15 minutes caring for the patient on the day of service. This included: obtaining or reviewing a separately obtained medical history, reviewing patient records, performing a medically appropriate exam and/or evaluation, counseling or educating the patient/family/caregiver, ordering medications, labs, and/or procedures and documenting such in the medical record. This does not include time spent on review and interpretation of other tests such as fetal ultrasound or the performance of other procedures such as amniocentesis or CVS.      Douglas A. Milligan, MD  Maternal Fetal Medicine, [facility]    Diagnostic [place]     2025

## 2025-02-24 ENCOUNTER — ROUTINE PRENATAL (OUTPATIENT)
Dept: OBSTETRICS AND GYNECOLOGY | Facility: CLINIC | Age: 40
End: 2025-02-24
Payer: COMMERCIAL

## 2025-02-24 ENCOUNTER — LAB (OUTPATIENT)
Dept: LAB | Facility: HOSPITAL | Age: 40
End: 2025-02-24
Payer: COMMERCIAL

## 2025-02-24 VITALS — WEIGHT: 212 LBS | SYSTOLIC BLOOD PRESSURE: 110 MMHG | DIASTOLIC BLOOD PRESSURE: 72 MMHG | BODY MASS INDEX: 37.55 KG/M2

## 2025-02-24 DIAGNOSIS — Z34.90 PRENATAL CARE, ANTEPARTUM: ICD-10-CM

## 2025-02-24 DIAGNOSIS — O36.5930 POOR FETAL GROWTH AFFECTING MANAGEMENT OF MOTHER IN THIRD TRIMESTER, SINGLE OR UNSPECIFIED FETUS: ICD-10-CM

## 2025-02-24 DIAGNOSIS — Z98.890 HISTORY OF GASTRIC SURGERY: Primary | ICD-10-CM

## 2025-02-24 DIAGNOSIS — Z34.90 PRENATAL CARE, ANTEPARTUM: Primary | ICD-10-CM

## 2025-02-24 DIAGNOSIS — O09.523 MULTIGRAVIDA OF ADVANCED MATERNAL AGE IN THIRD TRIMESTER: ICD-10-CM

## 2025-02-24 DIAGNOSIS — O99.210 OBESITY IN PREGNANCY, ANTEPARTUM: ICD-10-CM

## 2025-02-24 LAB
GLUCOSE UR STRIP-MCNC: NEGATIVE MG/DL
PROT UR STRIP-MCNC: NEGATIVE MG/DL

## 2025-02-24 PROCEDURE — 87081 CULTURE SCREEN ONLY: CPT

## 2025-02-24 NOTE — PROGRESS NOTES
OB FOLLOW UP  CC- Here for care of pregnancy        Brenda Trejo is a 39 y.o.  35w4d patient being seen today for her obstetrical follow up visit. Patient reports no complaints.     Pt saw [facility] on  and note states:   Biophysical Profile  ===============  2: Fetal breathing movements  2: Gross body movements  2: Fetal tone  2: Amniotic fluid volume   Biophysical profile score          Impression  =========  Size less than dates.  No fetal anomalies were identified.  Amniotic fluid volume is normal.  Umbilical artery S/D ratio is normal.  Patient counseled re fetal movement.        Recommendation  ===============  Continue  testing.  We recommend repeat evaluation for BPP and Doppler at [facility] in 1 week.  Follow up appointment scheduled here in 1 week.      Her prenatal care is complicated by (and status) : see below.  Patient Active Problem List   Diagnosis    Family history of breast cancer in mother    Lumbar herniated disc    Eczema    Chronic back pain    Anxiety    Asthma    AMA (advanced maternal age) multigravida 35+    Prenatal care    Obesity in pregnancy, antepartum    History of gastric surgery    Hypokalemia    Hypomagnesemia    Poor fetal growth affecting management of mother in third trimester       GBS swab done, pt is allergic to PCN and sensitivities added to swab.       Flu Status: Already given in current flu season  Ultrasound Today: No. Next US at [facility] on .   Non Stress Test: Yes minutes 20  non-stress test: NST: Reactive  indication: Fetal Growth Restriction (IUGR)    ROS -   Patient Denies: Loss of Fluid, Vaginal Spotting, Vision Changes, Headaches, Nausea , Vomiting , Contractions, Epigastric pain, and skin itching  Fetal Movement : normal  Other than what is documented in the HPI, all other systems reviewed and are negative.       The additional following portions of the patient's history were reviewed and updated as appropriate: allergies and current  medications.    I have reviewed and agree with the HPI, ROS, and historical information as entered above. Tiana Gonzáles MD      /72   Wt 96.2 kg (212 lb)   LMP 2024 (Exact Date)   BMI 37.55 kg/m²       EXAM:     Prenatal Vitals  BP: 110/72  Weight: 96.2 kg (212 lb)   Fetal Heart Rate: NST   Dilation/Effacement/Station  Dilation: Closed           Urine Glucose Read-only: Negative  Urine Protein Read-only: Negative           Assessment and Plan    Problem List Items Addressed This Visit          Gastrointestinal Abdominal     History of gastric surgery - Primary    Overview     History of gastric sleeve in             Gravid and     AMA (advanced maternal age) multigravida 35+    Overview     cfDNA negative.  Boy!         Prenatal care    Relevant Orders    POC Urinalysis Dipstick (Completed)    Group B Streptococcus Culture - Swab, Vaginal/Rectum    Obesity in pregnancy, antepartum    Overview     Hgb A1C at NOB 4.9  Early 1hr gtt at 2nd visit 100  Discussed carbohydrate control  Recommend limiting weight gain to 15-20lbs             Poor fetal growth affecting management of mother in third trimester    Overview     Plan for weekly Dopplers with [facility] on  and NSTs with us on .  Plan for delivery at 37 weeks.  AC 3rd to 4th percentile and overall growth 10th percentile at 35 weeks.  Recommend delivery between 37 and 38 weeks.  IOL 3/6/2025 at 1 AM            Pregnancy at 35w4d  Fetal status reassuring.   Activity and Exercise discussed.  Fetal movement/PTL or Labor precautions  GBS today.  Return in about 1 week (around 3/3/2025) for nst.    Tiana Gonzáles MD  2025

## 2025-02-27 ENCOUNTER — OFFICE VISIT (OUTPATIENT)
Dept: OBSTETRICS AND GYNECOLOGY | Facility: HOSPITAL | Age: 40
End: 2025-02-27
Payer: COMMERCIAL

## 2025-02-27 ENCOUNTER — HOSPITAL ENCOUNTER (OUTPATIENT)
Dept: WOMENS IMAGING | Facility: HOSPITAL | Age: 40
Discharge: HOME | End: 2025-02-27
Admitting: ADVANCED PRACTICE MIDWIFE
Payer: COMMERCIAL

## 2025-02-27 ENCOUNTER — ROUTINE PRENATAL (OUTPATIENT)
Dept: OBSTETRICS AND GYNECOLOGY | Facility: CLINIC | Age: 40
End: 2025-02-27
Payer: COMMERCIAL

## 2025-02-27 VITALS — WEIGHT: 211.6 LBS | BODY MASS INDEX: 37.48 KG/M2 | DIASTOLIC BLOOD PRESSURE: 74 MMHG | SYSTOLIC BLOOD PRESSURE: 116 MMHG

## 2025-02-27 VITALS — BODY MASS INDEX: 37.55 KG/M2 | WEIGHT: 212 LBS | SYSTOLIC BLOOD PRESSURE: 137 MMHG | DIASTOLIC BLOOD PRESSURE: 83 MMHG

## 2025-02-27 DIAGNOSIS — O36.5930 POOR FETAL GROWTH AFFECTING MANAGEMENT OF MOTHER IN THIRD TRIMESTER, SINGLE OR UNSPECIFIED FETUS: ICD-10-CM

## 2025-02-27 DIAGNOSIS — O35.EXX0 FETAL RENAL ANOMALY, SINGLE GESTATION: Primary | ICD-10-CM

## 2025-02-27 DIAGNOSIS — O99.210 OBESITY IN PREGNANCY, ANTEPARTUM: ICD-10-CM

## 2025-02-27 DIAGNOSIS — O09.523 MULTIGRAVIDA OF ADVANCED MATERNAL AGE IN THIRD TRIMESTER: ICD-10-CM

## 2025-02-27 DIAGNOSIS — Z34.90 PRENATAL CARE, ANTEPARTUM: Primary | ICD-10-CM

## 2025-02-27 LAB
BACTERIA SPEC AEROBE CULT: NORMAL
GLUCOSE UR STRIP-MCNC: NEGATIVE MG/DL
PROT UR STRIP-MCNC: NEGATIVE MG/DL

## 2025-02-27 PROCEDURE — 76820 UMBILICAL ARTERY ECHO: CPT

## 2025-02-27 PROCEDURE — 76819 FETAL BIOPHYS PROFIL W/O NST: CPT

## 2025-02-27 NOTE — PROGRESS NOTES
OB FOLLOW UP  CC- Here for care of pregnancy        Brenda Trejo is a 39 y.o.  36w0d patient being seen today for her obstetrical follow up visit. Patient reports no complaints.     Her prenatal care is complicated by (and status) : see below.  Patient Active Problem List   Diagnosis    Family history of breast cancer in mother    Lumbar herniated disc    Eczema    Chronic back pain    Anxiety    Asthma    AMA (advanced maternal age) multigravida 35+    Prenatal care    Obesity in pregnancy, antepartum    History of gastric surgery    Hypokalemia    Hypomagnesemia    Poor fetal growth affecting management of mother in third trimester    Fetal renal anomaly, single gestation       GBS Status: was already done and is negative.    Allergies   Allergen Reactions    Penicillins Rash     Keflex OK    Sulfa Antibiotics Hives          Flu Status: Already given in current flu season  Her Delivery Plan is:  IOL scheduled for 3/6/2025 at 1am     US today: yes at [facility]. Fetal renal anomaly, single gestation (Primary)  Assessment & Plan:  Left renal pelvis dilatation (urinary tract dilation/UTD) is seen.  Normal measurements at this age would be less than 7mm, but the renal measurements today were 9.7 mm on the lfet.  The bladder, amniotic fluid, renal parenchyma and calyces appear normal.  This is consistent with UTD A1: low risk.  The fetus appears to be a male.       These findings are most consistent with transient or physiologic dilation but could also be reflux or ureteropelvic junction obstruction.  Most of these cases remain stable or improve during gestation although they have been known to worsen occasionally.       Would recommend  follow-up.    Impression  ==========  Single intrauterine gestation in cephalic lie  Normal amniotic fluid volume  Normal umbilical Dopplers  BPP 8/8  Left UTD measuring 9.7 mm        Recommendation  ===============  Patient scheduled for delivery on 3/6      Non  Stress Test: No.    ROS -   Patient Denies: Loss of Fluid, Vaginal Spotting, Vision Changes, Headaches, Nausea , Vomiting , Contractions, Epigastric pain, and skin itching  Fetal Movement : normal  Other than what is documented in the HPI, all other systems reviewed and are negative.       The additional following portions of the patient's history were reviewed and updated as appropriate: allergies and current medications.    I have reviewed and agree with the HPI, ROS, and historical information as entered above. Tiana Gonzáles MD        EXAM:     Prenatal Vitals  BP: 116/74  Weight: 96 kg (211 lb 9.6 oz)   Fetal Heart Rate: pdc              Urine Glucose Read-only: Negative  Urine Protein Read-only: Negative           Assessment and Plan    Problem List Items Addressed This Visit          Gravid and     AMA (advanced maternal age) multigravida 35+    Overview     cfDNA negative.  Boy!         Prenatal care - Primary    Relevant Orders    POC Urinalysis Dipstick (Completed)    Obesity in pregnancy, antepartum    Overview     Hgb A1C at NOB 4.9  Early 1hr gtt at 2nd visit 100  Discussed carbohydrate control  Recommend limiting weight gain to 15-20lbs             Poor fetal growth affecting management of mother in third trimester    Overview     Plan for weekly Dopplers with [facility] on  and NSTs with us on .  Plan for delivery at 37 weeks.  AC 3rd to 4th percentile and overall growth 10th percentile at 35 weeks.  Recommend delivery between 37 and 38 weeks.  IOL 3/6/2025 at 1 AM            Pregnancy at 36w0d  Fetal status reassuring.   Reviewed Pre-eclampsia signs/symptoms  Delivery options reviewed with patient  Signs of labor reviewed  Kick counts reviewed  Activity and Exercise discussed.  Return in about 4 days (around 3/3/2025) for nst.    Tiana Gonzáles MD  2025

## 2025-02-27 NOTE — ASSESSMENT & PLAN NOTE
Left renal pelvis dilatation (urinary tract dilation/UTD) is seen.  Normal measurements at this age would be less than 7mm, but the renal measurements today were 9.7 mm on the lfet.  The bladder, amniotic fluid, renal parenchyma and calyces appear normal.  This is consistent with UTD A1: low risk.  The fetus appears to be a male.       These findings are most consistent with transient or physiologic dilation but could also be reflux or ureteropelvic junction obstruction.  Most of these cases remain stable or improve during gestation although they have been known to worsen occasionally.       Would recommend  follow-up.

## 2025-02-27 NOTE — LETTER
2025     CARLTON Ruggiero  [address]  [address]  [place] [address]    Patient: Brenda Trejo   YOB: 1985   Date of Visit: 2025       Dear CARLTON Ruggiero,    Thank you for referring Brenda Trejo to me for evaluation. Below is a copy of my consult note.    If you have questions, please do not hesitate to call me. I look forward to following Brenda along with you.         Sincerely,        Diomedes Orellana MD        CC: No Recipients    Patient denies any leaking fluid, vaginal bleeding, or contractions.  NIPT negative.  Patient reports next follow-up appointment with Dr. Gonzáles's office is today.          Maternal/Fetal Medicine Consult Note   Date: 2025  Name: Brenda Trejo    : 1985     MRN: [id number]     Referring Provider: CARLTON Ruggiero    Chief Complaint  AMA; Hx gastric sleeve; S<D    Subjective     History of Present Illness:  Brenda Trejo is a 39 y.o.  36w0d who presents today for AMA and small AC    LISHA: Estimated Date of Delivery: 3/27/25     ROS:   Otherwise Noted in HPI      Current Outpatient Medications:   •  Calcium 250 MG capsule, Take  by mouth., Disp: , Rfl:   •  famotidine (PEPCID) 20 MG tablet, Take 1 tablet by mouth 2 (Two) Times a Day. (Patient taking differently: Take 1 tablet by mouth As Needed.), Disp: 20 tablet, Rfl: 0  •  ferrous sulfate 325 (65 FE) MG tablet, Take 1 tablet by mouth Daily With Breakfast., Disp: , Rfl:   •  multivitamin with minerals tablet tablet, Take 1 tablet by mouth Daily., Disp: , Rfl:   •  Omega-3 Fatty Acids (fish oil) 1000 MG capsule capsule, Take  by mouth Daily With Breakfast., Disp: , Rfl:   •  ondansetron ODT (ZOFRAN-ODT) 4 MG disintegrating tablet, Take 1 tablet by mouth Every 8 (Eight) Hours As Needed for Nausea or Vomiting., Disp: 30 tablet, Rfl: 1  •  Prenatal Vit-Fe Fumarate-FA (prenatal vitamin 27-0.8) 27-0.8 MG tablet tablet, Take  by mouth Daily., Disp: , Rfl:   •   "promethazine (PHENERGAN) 12.5 MG tablet, Take 1 tablet by mouth Every 6 (Six) Hours As Needed for Nausea or Vomiting., Disp: 20 tablet, Rfl: 0  •  sucralfate (CARAFATE) 1 g tablet, Take 1 tablet by mouth 4 (Four) Times a Day Before Meals & at Bedtime. (Patient taking differently: Take 1 tablet by mouth As Needed.), Disp: 60 tablet, Rfl: 0  •  thiamine (VITAMIN B-1) 100 MG tablet  tablet, Take 1 tablet by mouth Daily., Disp: , Rfl:   •  vitamin B-12 (CYANOCOBALAMIN) 500 MCG tablet, Take 1 tablet by mouth Daily., Disp: , Rfl:   •  vitamin B-6 (PYRIDOXINE) 50 MG tablet, Take 1 tablet by mouth Daily., Disp: , Rfl:     Objective     Vital Signs  /83   Wt 96.2 kg (212 lb)   LMP 2024 (Exact Date)   Estimated body mass index is 37.55 kg/m² as calculated from the following:    Height as of 1/10/25: 160 cm (63\").    Weight as of this encounter: 96.2 kg (212 lb).    Ultrasound Impression:   See Viewpoint     Assessment and Plan     Brenda Trejo is a 39 y.o.  36w0d who presents today for AMA and small AC    Diagnoses and all orders for this visit:    1. Fetal renal anomaly, single gestation (Primary)  Assessment & Plan:  Left renal pelvis dilatation (urinary tract dilation/UTD) is seen.  Normal measurements at this age would be less than 7mm, but the renal measurements today were 9.7 mm on the lfet.  The bladder, amniotic fluid, renal parenchyma and calyces appear normal.  This is consistent with UTD A1: low risk.  The fetus appears to be a male.       These findings are most consistent with transient or physiologic dilation but could also be reflux or ureteropelvic junction obstruction.  Most of these cases remain stable or improve during gestation although they have been known to worsen occasionally.       Would recommend  follow-up.           Follow Up  No follow-ups on file.    I spent 10 minutes caring for the patient on the day of service. This included: obtaining or reviewing a " separately obtained medical history, reviewing patient records, performing a medically appropriate exam and/or evaluation, counseling or educating the patient/family/caregiver, ordering medications, labs, and/or procedures and documenting such in the medical record. This does not include time spent on review and interpretation of other tests such as fetal ultrasound or the performance of other procedures such as amniocentesis or CVS.      Diomedes Orellana MD, FACOG  Maternal Fetal Medicine, [facility] [place]

## 2025-02-27 NOTE — PROGRESS NOTES
Maternal/Fetal Medicine Consult Note   Date: 2025  Name: Brenda Trejo    : 1985     MRN: [id number]     Referring Provider: CARLTON Ruggiero    Chief Complaint  AMA; Hx gastric sleeve; S<D    Subjective     History of Present Illness:  Brenda Trejo is a 39 y.o.  36w0d who presents today for AMA and small AC    LISHA: Estimated Date of Delivery: 3/27/25     ROS:   Otherwise Noted in HPI      Current Outpatient Medications:     Calcium 250 MG capsule, Take  by mouth., Disp: , Rfl:     famotidine (PEPCID) 20 MG tablet, Take 1 tablet by mouth 2 (Two) Times a Day. (Patient taking differently: Take 1 tablet by mouth As Needed.), Disp: 20 tablet, Rfl: 0    ferrous sulfate 325 (65 FE) MG tablet, Take 1 tablet by mouth Daily With Breakfast., Disp: , Rfl:     multivitamin with minerals tablet tablet, Take 1 tablet by mouth Daily., Disp: , Rfl:     Omega-3 Fatty Acids (fish oil) 1000 MG capsule capsule, Take  by mouth Daily With Breakfast., Disp: , Rfl:     ondansetron ODT (ZOFRAN-ODT) 4 MG disintegrating tablet, Take 1 tablet by mouth Every 8 (Eight) Hours As Needed for Nausea or Vomiting., Disp: 30 tablet, Rfl: 1    Prenatal Vit-Fe Fumarate-FA (prenatal vitamin 27-0.8) 27-0.8 MG tablet tablet, Take  by mouth Daily., Disp: , Rfl:     promethazine (PHENERGAN) 12.5 MG tablet, Take 1 tablet by mouth Every 6 (Six) Hours As Needed for Nausea or Vomiting., Disp: 20 tablet, Rfl: 0    sucralfate (CARAFATE) 1 g tablet, Take 1 tablet by mouth 4 (Four) Times a Day Before Meals & at Bedtime. (Patient taking differently: Take 1 tablet by mouth As Needed.), Disp: 60 tablet, Rfl: 0    thiamine (VITAMIN B-1) 100 MG tablet  tablet, Take 1 tablet by mouth Daily., Disp: , Rfl:     vitamin B-12 (CYANOCOBALAMIN) 500 MCG tablet, Take 1 tablet by mouth Daily., Disp: , Rfl:     vitamin B-6 (PYRIDOXINE) 50 MG tablet, Take 1 tablet by mouth Daily., Disp: , Rfl:     Objective     Vital Signs  /83   Wt 96.2 kg  "(212 lb)   LMP 2024 (Exact Date)   Estimated body mass index is 37.55 kg/m² as calculated from the following:    Height as of 1/10/25: 160 cm (63\").    Weight as of this encounter: 96.2 kg (212 lb).    Ultrasound Impression:   See Viewpoint     Assessment and Plan     Brenda Trejo is a 39 y.o.  36w0d who presents today for AMA and small AC    Diagnoses and all orders for this visit:    1. Fetal renal anomaly, single gestation (Primary)  Assessment & Plan:  Left renal pelvis dilatation (urinary tract dilation/UTD) is seen.  Normal measurements at this age would be less than 7mm, but the renal measurements today were 9.7 mm on the lfet.  The bladder, amniotic fluid, renal parenchyma and calyces appear normal.  This is consistent with UTD A1: low risk.  The fetus appears to be a male.       These findings are most consistent with transient or physiologic dilation but could also be reflux or ureteropelvic junction obstruction.  Most of these cases remain stable or improve during gestation although they have been known to worsen occasionally.       Would recommend  follow-up.           Follow Up  No follow-ups on file.    I spent 10 minutes caring for the patient on the day of service. This included: obtaining or reviewing a separately obtained medical history, reviewing patient records, performing a medically appropriate exam and/or evaluation, counseling or educating the patient/family/caregiver, ordering medications, labs, and/or procedures and documenting such in the medical record. This does not include time spent on review and interpretation of other tests such as fetal ultrasound or the performance of other procedures such as amniocentesis or CVS.      Diomedes Orellana MD, FACOG  Maternal Fetal Medicine, [facility]    Diagnostic Center   "

## 2025-03-03 ENCOUNTER — ROUTINE PRENATAL (OUTPATIENT)
Dept: OBSTETRICS AND GYNECOLOGY | Facility: CLINIC | Age: 40
End: 2025-03-03
Payer: COMMERCIAL

## 2025-03-03 VITALS — SYSTOLIC BLOOD PRESSURE: 124 MMHG | DIASTOLIC BLOOD PRESSURE: 74 MMHG | WEIGHT: 212.6 LBS | BODY MASS INDEX: 37.66 KG/M2

## 2025-03-03 DIAGNOSIS — O99.210 OBESITY IN PREGNANCY, ANTEPARTUM: ICD-10-CM

## 2025-03-03 DIAGNOSIS — O35.EXX0 FETAL RENAL ANOMALY, SINGLE GESTATION: ICD-10-CM

## 2025-03-03 DIAGNOSIS — Z34.90 PRENATAL CARE, ANTEPARTUM: Primary | ICD-10-CM

## 2025-03-03 DIAGNOSIS — O09.523 MULTIGRAVIDA OF ADVANCED MATERNAL AGE IN THIRD TRIMESTER: ICD-10-CM

## 2025-03-03 DIAGNOSIS — O36.5930 POOR FETAL GROWTH AFFECTING MANAGEMENT OF MOTHER IN THIRD TRIMESTER, SINGLE OR UNSPECIFIED FETUS: ICD-10-CM

## 2025-03-03 LAB
GLUCOSE UR STRIP-MCNC: NEGATIVE MG/DL
PROT UR STRIP-MCNC: NEGATIVE MG/DL

## 2025-03-03 NOTE — PROGRESS NOTES
OB FOLLOW UP  CC- Here for care of pregnancy        Brenda Trejo is a 39 y.o.  36w4d patient being seen today for her obstetrical follow up visit. Patient reports occasional cramping.     Her prenatal care is complicated by (and status) : see below.  Patient Active Problem List   Diagnosis    Family history of breast cancer in mother    Lumbar herniated disc    Eczema    Chronic back pain    Anxiety    Asthma    AMA (advanced maternal age) multigravida 35+    Prenatal care    Obesity in pregnancy, antepartum    History of gastric surgery    Hypokalemia    Hypomagnesemia    Poor fetal growth affecting management of mother in third trimester    Fetal renal anomaly, single gestation       GBS Status: was already done and is negative.    Allergies   Allergen Reactions    Penicillins Rash     Keflex OK    Sulfa Antibiotics Hives          Flu Status: Already given in current flu season  Her Delivery Plan is:  IOL scheduled for 3/6/2025 at 1am     US today: no  Non Stress Test: Yes minutes 20  non-stress test: NST: Reactive  indication: Fetal Growth Restriction (IUGR)    ROS -   Patient Denies: Loss of Fluid, Vaginal Spotting, Vision Changes, Headaches, Nausea , Vomiting , Epigastric pain, and skin itching  Fetal Movement : normal  Other than what is documented in the HPI, all other systems reviewed and are negative.       The additional following portions of the patient's history were reviewed and updated as appropriate: allergies and current medications.    I have reviewed and agree with the HPI, ROS, and historical information as entered above. Tiana Gonzáles MD        EXAM:     Prenatal Vitals  BP: 124/74  Weight: 96.4 kg (212 lb 9.6 oz)   Fetal Heart Rate: NST       Dilation/Effacement/Station  Dilation: Fingertip  Effacement (%): 0  Station: -2      Urine Glucose Read-only: Negative  Urine Protein Read-only: Negative           Assessment and Plan    Problem List Items Addressed This Visit           Gravid and     AMA (advanced maternal age) multigravida 35+    Overview     cfDNA negative.  Boy!         Prenatal care - Primary    Relevant Orders    POC Urinalysis Dipstick (Completed)    Obesity in pregnancy, antepartum    Overview     Hgb A1C at NOB 4.9  Early 1hr gtt at 2nd visit 100  Discussed carbohydrate control  Recommend limiting weight gain to 15-20lbs             Poor fetal growth affecting management of mother in third trimester    Overview     Plan for weekly Dopplers with PDC on  and NSTs with us on .  Plan for delivery at 37 weeks.  AC 3rd to 4th percentile and overall growth 10th percentile at 35 weeks.  Recommend delivery between 37 and 38 weeks.  IOL 3/6/2025 at 1 AM         Fetal renal anomaly, single gestation       Pregnancy at 36w4d  Fetal status reassuring.   Reviewed Pre-eclampsia signs/symptoms  Delivery options reviewed with patient  Signs of labor reviewed  Kick counts reviewed  Activity and Exercise discussed.  Return in about 6 weeks (around 2025) for PP check.    Tiana Gonzáles MD  2025

## 2025-03-05 ENCOUNTER — HOSPITAL ENCOUNTER (OUTPATIENT)
Dept: LABOR AND DELIVERY | Facility: HOSPITAL | Age: 40
Discharge: HOME | End: 2025-03-05
Payer: COMMERCIAL

## 2025-03-05 ENCOUNTER — HOSPITAL ENCOUNTER (INPATIENT)
Facility: HOSPITAL | Age: 40
LOS: 3 days | Discharge: HOME | End: 2025-03-08
Attending: OBSTETRICS & GYNECOLOGY | Admitting: OBSTETRICS & GYNECOLOGY
Payer: COMMERCIAL

## 2025-03-05 DIAGNOSIS — O99.210 OBESITY IN PREGNANCY, ANTEPARTUM: Primary | ICD-10-CM

## 2025-03-05 PROBLEM — O36.5990 IUGR (INTRAUTERINE GROWTH RESTRICTION) AFFECTING CARE OF MOTHER: Status: ACTIVE | Noted: 2025-03-05

## 2025-03-05 LAB
ABO GROUP BLD: NORMAL
ALBUMIN SERPL-MCNC: 3.6 G/DL (ref 3.5–5.2)
ALBUMIN/GLOB SERPL: 1.2 G/DL
ALP SERPL-CCNC: 121 U/L (ref 39–117)
ALT SERPL W P-5'-P-CCNC: 10 U/L (ref 1–33)
ANION GAP SERPL CALCULATED.3IONS-SCNC: 16 MMOL/L (ref 5–15)
AST SERPL-CCNC: 22 U/L (ref 1–32)
BILIRUB SERPL-MCNC: 0.2 MG/DL (ref 0–1.2)
BLD GP AB SCN SERPL QL: NEGATIVE
BUN SERPL-MCNC: 12 MG/DL (ref 6–20)
BUN/CREAT SERPL: 15.6 (ref 7–25)
CALCIUM SPEC-SCNC: 8.9 MG/DL (ref 8.6–10.5)
CHLORIDE SERPL-SCNC: 104 MMOL/L (ref 98–107)
CO2 SERPL-SCNC: 20 MMOL/L (ref 22–29)
CREAT SERPL-MCNC: 0.77 MG/DL (ref 0.57–1)
DEPRECATED RDW RBC AUTO: 42.2 FL (ref 37–54)
EGFRCR SERPLBLD CKD-EPI 2021: 100.8 ML/MIN/1.73
ERYTHROCYTE [DISTWIDTH] IN BLOOD BY AUTOMATED COUNT: 13.1 % (ref 12.3–15.4)
GLOBULIN UR ELPH-MCNC: 2.9 GM/DL
GLUCOSE SERPL-MCNC: 72 MG/DL (ref 65–99)
HCT VFR BLD AUTO: 35 % (ref 34–46.6)
HGB BLD-MCNC: 12.3 G/DL (ref 12–15.9)
LDH SERPL-CCNC: 149 U/L (ref 135–214)
MCH RBC QN AUTO: 31.4 PG (ref 26.6–33)
MCHC RBC AUTO-ENTMCNC: 35.1 G/DL (ref 31.5–35.7)
MCV RBC AUTO: 89.3 FL (ref 79–97)
PLATELET # BLD AUTO: 188 10*3/MM3 (ref 140–450)
PMV BLD AUTO: 9.2 FL (ref 6–12)
POTASSIUM SERPL-SCNC: 4.2 MMOL/L (ref 3.5–5.2)
PROT SERPL-MCNC: 6.5 G/DL (ref 6–8.5)
RBC # BLD AUTO: 3.92 10*6/MM3 (ref 3.77–5.28)
RH BLD: POSITIVE
SODIUM SERPL-SCNC: 140 MMOL/L (ref 136–145)
T&S EXPIRATION DATE: NORMAL
URATE SERPL-MCNC: 4.9 MG/DL (ref 2.4–5.7)
WBC NRBC COR # BLD AUTO: 10.61 10*3/MM3 (ref 3.4–10.8)

## 2025-03-05 PROCEDURE — 83615 LACTATE (LD) (LDH) ENZYME: CPT | Performed by: OBSTETRICS & GYNECOLOGY

## 2025-03-05 PROCEDURE — 84156 ASSAY OF PROTEIN URINE: CPT | Performed by: OBSTETRICS & GYNECOLOGY

## 2025-03-05 PROCEDURE — 59025 FETAL NON-STRESS TEST: CPT

## 2025-03-05 PROCEDURE — 25810000003 LACTATED RINGERS PER 1000 ML: Performed by: OBSTETRICS & GYNECOLOGY

## 2025-03-05 PROCEDURE — 84550 ASSAY OF BLOOD/URIC ACID: CPT | Performed by: OBSTETRICS & GYNECOLOGY

## 2025-03-05 PROCEDURE — 86901 BLOOD TYPING SEROLOGIC RH(D): CPT | Performed by: OBSTETRICS & GYNECOLOGY

## 2025-03-05 PROCEDURE — 82570 ASSAY OF URINE CREATININE: CPT | Performed by: OBSTETRICS & GYNECOLOGY

## 2025-03-05 PROCEDURE — 80053 COMPREHEN METABOLIC PANEL: CPT | Performed by: OBSTETRICS & GYNECOLOGY

## 2025-03-05 PROCEDURE — 86850 RBC ANTIBODY SCREEN: CPT | Performed by: OBSTETRICS & GYNECOLOGY

## 2025-03-05 PROCEDURE — 86780 TREPONEMA PALLIDUM: CPT | Performed by: OBSTETRICS & GYNECOLOGY

## 2025-03-05 PROCEDURE — 85027 COMPLETE CBC AUTOMATED: CPT | Performed by: OBSTETRICS & GYNECOLOGY

## 2025-03-05 PROCEDURE — 86900 BLOOD TYPING SEROLOGIC ABO: CPT | Performed by: OBSTETRICS & GYNECOLOGY

## 2025-03-05 RX ORDER — PROMETHAZINE HYDROCHLORIDE 12.5 MG/1
12.5 SUPPOSITORY RECTAL EVERY 6 HOURS PRN
Status: DISCONTINUED | OUTPATIENT
Start: 2025-03-05 | End: 2025-03-06 | Stop reason: HOSPADM

## 2025-03-05 RX ORDER — OXYTOCIN/0.9 % SODIUM CHLORIDE 30/500 ML
250 PLASTIC BAG, INJECTION (ML) INTRAVENOUS CONTINUOUS
Status: ACTIVE | OUTPATIENT
Start: 2025-03-05 | End: 2025-03-05

## 2025-03-05 RX ORDER — PROMETHAZINE HYDROCHLORIDE 12.5 MG/1
12.5 TABLET ORAL EVERY 6 HOURS PRN
Status: DISCONTINUED | OUTPATIENT
Start: 2025-03-05 | End: 2025-03-06 | Stop reason: HOSPADM

## 2025-03-05 RX ORDER — BUTORPHANOL TARTRATE 1 MG/ML
1 INJECTION, SOLUTION INTRAMUSCULAR; INTRAVENOUS
Status: DISCONTINUED | OUTPATIENT
Start: 2025-03-05 | End: 2025-03-06 | Stop reason: HOSPADM

## 2025-03-05 RX ORDER — ACETAMINOPHEN 325 MG/1
650 TABLET ORAL EVERY 4 HOURS PRN
Status: DISCONTINUED | OUTPATIENT
Start: 2025-03-05 | End: 2025-03-06 | Stop reason: HOSPADM

## 2025-03-05 RX ORDER — ONDANSETRON 2 MG/ML
4 INJECTION INTRAMUSCULAR; INTRAVENOUS EVERY 6 HOURS PRN
Status: DISCONTINUED | OUTPATIENT
Start: 2025-03-05 | End: 2025-03-06 | Stop reason: HOSPADM

## 2025-03-05 RX ORDER — BUTORPHANOL TARTRATE 2 MG/ML
2 INJECTION, SOLUTION INTRAMUSCULAR; INTRAVENOUS
Status: DISCONTINUED | OUTPATIENT
Start: 2025-03-05 | End: 2025-03-06 | Stop reason: HOSPADM

## 2025-03-05 RX ORDER — TERBUTALINE SULFATE 1 MG/ML
0.25 INJECTION SUBCUTANEOUS AS NEEDED
Status: DISCONTINUED | OUTPATIENT
Start: 2025-03-05 | End: 2025-03-06 | Stop reason: HOSPADM

## 2025-03-05 RX ORDER — MISOPROSTOL 200 UG/1
800 TABLET ORAL AS NEEDED
Status: DISCONTINUED | OUTPATIENT
Start: 2025-03-05 | End: 2025-03-06 | Stop reason: HOSPADM

## 2025-03-05 RX ORDER — MAGNESIUM CARB/ALUMINUM HYDROX 105-160MG
30 TABLET,CHEWABLE ORAL ONCE
Status: DISCONTINUED | OUTPATIENT
Start: 2025-03-05 | End: 2025-03-06 | Stop reason: HOSPADM

## 2025-03-05 RX ORDER — CITRIC ACID/SODIUM CITRATE 334-500MG
30 SOLUTION, ORAL ORAL ONCE AS NEEDED
Status: DISCONTINUED | OUTPATIENT
Start: 2025-03-05 | End: 2025-03-06 | Stop reason: HOSPADM

## 2025-03-05 RX ORDER — IBUPROFEN 600 MG/1
600 TABLET, FILM COATED ORAL EVERY 6 HOURS PRN
Status: DISCONTINUED | OUTPATIENT
Start: 2025-03-05 | End: 2025-03-06 | Stop reason: HOSPADM

## 2025-03-05 RX ORDER — METHYLERGONOVINE MALEATE 0.2 MG/ML
200 INJECTION INTRAVENOUS ONCE AS NEEDED
Status: DISCONTINUED | OUTPATIENT
Start: 2025-03-05 | End: 2025-03-06 | Stop reason: HOSPADM

## 2025-03-05 RX ORDER — SODIUM CHLORIDE 9 MG/ML
40 INJECTION, SOLUTION INTRAVENOUS AS NEEDED
Status: DISCONTINUED | OUTPATIENT
Start: 2025-03-05 | End: 2025-03-06 | Stop reason: HOSPADM

## 2025-03-05 RX ORDER — HYDROCODONE BITARTRATE AND ACETAMINOPHEN 5; 325 MG/1; MG/1
1 TABLET ORAL EVERY 4 HOURS PRN
Status: DISCONTINUED | OUTPATIENT
Start: 2025-03-05 | End: 2025-03-06 | Stop reason: HOSPADM

## 2025-03-05 RX ORDER — ONDANSETRON 4 MG/1
4 TABLET, ORALLY DISINTEGRATING ORAL EVERY 6 HOURS PRN
Status: DISCONTINUED | OUTPATIENT
Start: 2025-03-05 | End: 2025-03-06 | Stop reason: HOSPADM

## 2025-03-05 RX ORDER — OXYTOCIN/0.9 % SODIUM CHLORIDE 30/500 ML
2-20 PLASTIC BAG, INJECTION (ML) INTRAVENOUS
Status: DISCONTINUED | OUTPATIENT
Start: 2025-03-05 | End: 2025-03-06 | Stop reason: HOSPADM

## 2025-03-05 RX ORDER — LIDOCAINE HYDROCHLORIDE 10 MG/ML
0.5 INJECTION, SOLUTION EPIDURAL; INFILTRATION; INTRACAUDAL; PERINEURAL ONCE AS NEEDED
Status: DISCONTINUED | OUTPATIENT
Start: 2025-03-05 | End: 2025-03-06 | Stop reason: HOSPADM

## 2025-03-05 RX ORDER — OXYTOCIN/0.9 % SODIUM CHLORIDE 30/500 ML
999 PLASTIC BAG, INJECTION (ML) INTRAVENOUS ONCE
Status: DISCONTINUED | OUTPATIENT
Start: 2025-03-05 | End: 2025-03-06 | Stop reason: HOSPADM

## 2025-03-05 RX ORDER — SODIUM CHLORIDE 0.9 % (FLUSH) 0.9 %
10 SYRINGE (ML) INJECTION EVERY 12 HOURS SCHEDULED
Status: DISCONTINUED | OUTPATIENT
Start: 2025-03-05 | End: 2025-03-06 | Stop reason: HOSPADM

## 2025-03-05 RX ORDER — SODIUM CHLORIDE 0.9 % (FLUSH) 0.9 %
10 SYRINGE (ML) INJECTION AS NEEDED
Status: DISCONTINUED | OUTPATIENT
Start: 2025-03-05 | End: 2025-03-06 | Stop reason: HOSPADM

## 2025-03-05 RX ORDER — SODIUM CHLORIDE, SODIUM LACTATE, POTASSIUM CHLORIDE, CALCIUM CHLORIDE 600; 310; 30; 20 MG/100ML; MG/100ML; MG/100ML; MG/100ML
125 INJECTION, SOLUTION INTRAVENOUS CONTINUOUS
Status: DISCONTINUED | OUTPATIENT
Start: 2025-03-05 | End: 2025-03-06

## 2025-03-05 RX ORDER — CARBOPROST TROMETHAMINE 250 UG/ML
250 INJECTION, SOLUTION INTRAMUSCULAR AS NEEDED
Status: DISCONTINUED | OUTPATIENT
Start: 2025-03-05 | End: 2025-03-06 | Stop reason: HOSPADM

## 2025-03-05 RX ADMIN — SODIUM CHLORIDE, SODIUM LACTATE, POTASSIUM CHLORIDE, CALCIUM CHLORIDE 125 ML/HR: 20; 30; 600; 310 INJECTION, SOLUTION INTRAVENOUS at 19:04

## 2025-03-05 RX ADMIN — DINOPROSTONE 10 MG: 10 INSERT VAGINAL at 19:50

## 2025-03-06 ENCOUNTER — ANESTHESIA (OUTPATIENT)
Dept: LABOR AND DELIVERY | Facility: HOSPITAL | Age: 40
End: 2025-03-06
Payer: COMMERCIAL

## 2025-03-06 ENCOUNTER — ANESTHESIA EVENT (OUTPATIENT)
Dept: LABOR AND DELIVERY | Facility: HOSPITAL | Age: 40
End: 2025-03-06
Payer: COMMERCIAL

## 2025-03-06 LAB
ATMOSPHERIC PRESS: ABNORMAL MM[HG]
ATMOSPHERIC PRESS: ABNORMAL MM[HG]
BASE EXCESS BLDCOA CALC-SCNC: -5.4 MMOL/L (ref 0–2)
BASE EXCESS BLDCOV CALC-SCNC: -2.9 MMOL/L (ref 0–2)
BDY SITE: ABNORMAL
BDY SITE: ABNORMAL
BODY TEMPERATURE: 37
BODY TEMPERATURE: 37
CO2 BLDA-SCNC: 25.1 MMOL/L (ref 22–33)
CO2 BLDA-SCNC: 26.2 MMOL/L (ref 22–33)
CREAT UR-MCNC: 86.1 MG/DL
EPAP: 0
EPAP: 0
GLUCOSE BLDC GLUCOMTR-MCNC: 93 MG/DL (ref 70–130)
HCO3 BLDCOA-SCNC: 24.3 MMOL/L (ref 16.9–20.5)
HCO3 BLDCOV-SCNC: 23.6 MMOL/L (ref 18.6–21.4)
HGB BLDA-MCNC: 14.9 G/DL (ref 14–18)
HGB BLDA-MCNC: 16 G/DL (ref 14–18)
INHALED O2 CONCENTRATION: 21 %
INHALED O2 CONCENTRATION: 21 %
IPAP: 0
IPAP: 0
MODALITY: ABNORMAL
MODALITY: ABNORMAL
NOTE: 0
PAW @ PEAK INSP FLOW SETTING VENT: 0 CMH2O
PAW @ PEAK INSP FLOW SETTING VENT: 0 CMH2O
PCO2 BLDCOA: 63.2 MMHG (ref 43.3–54.9)
PCO2 BLDCOV: 46.6 MM HG (ref 28–40)
PH BLDCOA: 7.19 PH UNITS (ref 7.22–7.3)
PH BLDCOV: 7.31 PH UNITS (ref 7.31–7.37)
PO2 BLDCOA: 14.5 MMHG (ref 11.5–43.3)
PO2 BLDCOV: 21.9 MM HG (ref 21–31)
PROT ?TM UR-MCNC: 9.8 MG/DL
PROT/CREAT UR: 113.8 MG/G CREA (ref 0–200)
SAO2 % BLDCOA: 18.5 %
SAO2 % BLDCOA: ABNORMAL %
SAO2 % BLDCOV: 44.5 %
TOTAL RATE: 0 BREATHS/MINUTE
TOTAL RATE: 0 BREATHS/MINUTE
TREPONEMA PALLIDUM IGG+IGM AB [PRESENCE] IN SERUM OR PLASMA BY IMMUNOASSAY: NORMAL
VENTILATOR MODE: ABNORMAL

## 2025-03-06 PROCEDURE — 25010000002 KETOROLAC TROMETHAMINE PER 15 MG: Performed by: OBSTETRICS & GYNECOLOGY

## 2025-03-06 PROCEDURE — 82948 REAGENT STRIP/BLOOD GLUCOSE: CPT

## 2025-03-06 PROCEDURE — 25010000002 FENTANYL CITRATE (PF) 100 MCG/2ML SOLUTION: Performed by: NURSE ANESTHETIST, CERTIFIED REGISTERED

## 2025-03-06 PROCEDURE — 25010000002 ONDANSETRON PER 1 MG: Performed by: NURSE ANESTHETIST, CERTIFIED REGISTERED

## 2025-03-06 PROCEDURE — 25010000002 PROMETHAZINE PER 50 MG: Performed by: OBSTETRICS & GYNECOLOGY

## 2025-03-06 PROCEDURE — 82805 BLOOD GASES W/O2 SATURATION: CPT

## 2025-03-06 PROCEDURE — 25010000002 OXYTOCIN PER 10 UNITS: Performed by: NURSE ANESTHETIST, CERTIFIED REGISTERED

## 2025-03-06 PROCEDURE — 25810000003 LACTATED RINGERS PER 1000 ML: Performed by: OBSTETRICS & GYNECOLOGY

## 2025-03-06 PROCEDURE — 25010000002 CEFAZOLIN PER 500 MG: Performed by: OBSTETRICS & GYNECOLOGY

## 2025-03-06 PROCEDURE — 88307 TISSUE EXAM BY PATHOLOGIST: CPT | Performed by: OBSTETRICS & GYNECOLOGY

## 2025-03-06 PROCEDURE — 25010000002 BUPIVACAINE IN DEXTROSE 0.75-8.25 % SOLUTION: Performed by: NURSE ANESTHETIST, CERTIFIED REGISTERED

## 2025-03-06 PROCEDURE — 63710000001 ONDANSETRON ODT 4 MG TABLET DISPERSIBLE: Performed by: OBSTETRICS & GYNECOLOGY

## 2025-03-06 PROCEDURE — C1765 ADHESION BARRIER: HCPCS | Performed by: OBSTETRICS & GYNECOLOGY

## 2025-03-06 PROCEDURE — 25010000002 MIDAZOLAM PER 1 MG: Performed by: NURSE ANESTHETIST, CERTIFIED REGISTERED

## 2025-03-06 PROCEDURE — 25010000002 METOCLOPRAMIDE PER 10 MG: Performed by: NURSE ANESTHETIST, CERTIFIED REGISTERED

## 2025-03-06 PROCEDURE — 25010000002 MORPHINE PER 10 MG: Performed by: NURSE ANESTHETIST, CERTIFIED REGISTERED

## 2025-03-06 DEVICE — ABSORBABLE ADHESION BARRIER
Type: IMPLANTABLE DEVICE | Site: UTERUS | Status: FUNCTIONAL
Brand: GYNECARE INTERCEED

## 2025-03-06 RX ORDER — ONDANSETRON 2 MG/ML
4 INJECTION INTRAMUSCULAR; INTRAVENOUS ONCE AS NEEDED
Status: ACTIVE | OUTPATIENT
Start: 2025-03-06 | End: 2025-03-07

## 2025-03-06 RX ORDER — OXYTOCIN/0.9 % SODIUM CHLORIDE 30/500 ML
PLASTIC BAG, INJECTION (ML) INTRAVENOUS AS NEEDED
Status: DISCONTINUED | OUTPATIENT
Start: 2025-03-06 | End: 2025-03-06 | Stop reason: SURG

## 2025-03-06 RX ORDER — FENTANYL CITRATE 50 UG/ML
INJECTION, SOLUTION INTRAMUSCULAR; INTRAVENOUS AS NEEDED
Status: DISCONTINUED | OUTPATIENT
Start: 2025-03-06 | End: 2025-03-06 | Stop reason: SURG

## 2025-03-06 RX ORDER — IBUPROFEN 600 MG/1
600 TABLET, FILM COATED ORAL EVERY 6 HOURS
Status: DISCONTINUED | OUTPATIENT
Start: 2025-03-07 | End: 2025-03-08 | Stop reason: HOSPADM

## 2025-03-06 RX ORDER — MISOPROSTOL 200 UG/1
600 TABLET ORAL AS NEEDED
Status: DISCONTINUED | OUTPATIENT
Start: 2025-03-06 | End: 2025-03-08 | Stop reason: HOSPADM

## 2025-03-06 RX ORDER — CITRIC ACID/SODIUM CITRATE 334-500MG
30 SOLUTION, ORAL ORAL ONCE
Status: COMPLETED | OUTPATIENT
Start: 2025-03-06 | End: 2025-03-06

## 2025-03-06 RX ORDER — MORPHINE SULFATE 0.5 MG/ML
INJECTION, SOLUTION EPIDURAL; INTRATHECAL; INTRAVENOUS AS NEEDED
Status: DISCONTINUED | OUTPATIENT
Start: 2025-03-06 | End: 2025-03-06 | Stop reason: SURG

## 2025-03-06 RX ORDER — ONDANSETRON 2 MG/ML
INJECTION INTRAMUSCULAR; INTRAVENOUS AS NEEDED
Status: DISCONTINUED | OUTPATIENT
Start: 2025-03-06 | End: 2025-03-06 | Stop reason: SURG

## 2025-03-06 RX ORDER — ACETAMINOPHEN 325 MG/1
650 TABLET ORAL EVERY 6 HOURS
Status: DISCONTINUED | OUTPATIENT
Start: 2025-03-07 | End: 2025-03-08 | Stop reason: HOSPADM

## 2025-03-06 RX ORDER — DIPHENHYDRAMINE HYDROCHLORIDE 50 MG/ML
25 INJECTION INTRAMUSCULAR; INTRAVENOUS EVERY 4 HOURS PRN
Status: DISCONTINUED | OUTPATIENT
Start: 2025-03-06 | End: 2025-03-08 | Stop reason: HOSPADM

## 2025-03-06 RX ORDER — SODIUM CHLORIDE 0.9 % (FLUSH) 0.9 %
10 SYRINGE (ML) INJECTION EVERY 12 HOURS SCHEDULED
Status: DISCONTINUED | OUTPATIENT
Start: 2025-03-06 | End: 2025-03-08 | Stop reason: HOSPADM

## 2025-03-06 RX ORDER — DIPHENHYDRAMINE HYDROCHLORIDE 50 MG/ML
25 INJECTION INTRAMUSCULAR; INTRAVENOUS ONCE AS NEEDED
Status: DISCONTINUED | OUTPATIENT
Start: 2025-03-06 | End: 2025-03-08 | Stop reason: HOSPADM

## 2025-03-06 RX ORDER — ACETAMINOPHEN 500 MG
1000 TABLET ORAL EVERY 6 HOURS
Status: COMPLETED | OUTPATIENT
Start: 2025-03-06 | End: 2025-03-07

## 2025-03-06 RX ORDER — OXYCODONE HYDROCHLORIDE 5 MG/1
5 TABLET ORAL EVERY 4 HOURS PRN
Status: DISCONTINUED | OUTPATIENT
Start: 2025-03-06 | End: 2025-03-08 | Stop reason: HOSPADM

## 2025-03-06 RX ORDER — MIDAZOLAM HYDROCHLORIDE 1 MG/ML
INJECTION, SOLUTION INTRAMUSCULAR; INTRAVENOUS AS NEEDED
Status: DISCONTINUED | OUTPATIENT
Start: 2025-03-06 | End: 2025-03-06 | Stop reason: SURG

## 2025-03-06 RX ORDER — SODIUM CHLORIDE 0.9 % (FLUSH) 0.9 %
1-10 SYRINGE (ML) INJECTION AS NEEDED
Status: DISCONTINUED | OUTPATIENT
Start: 2025-03-06 | End: 2025-03-08 | Stop reason: HOSPADM

## 2025-03-06 RX ORDER — KETOROLAC TROMETHAMINE 15 MG/ML
15 INJECTION, SOLUTION INTRAMUSCULAR; INTRAVENOUS EVERY 6 HOURS
Status: COMPLETED | OUTPATIENT
Start: 2025-03-06 | End: 2025-03-07

## 2025-03-06 RX ORDER — PROMETHAZINE HYDROCHLORIDE 25 MG/1
25 TABLET ORAL EVERY 6 HOURS PRN
Status: DISCONTINUED | OUTPATIENT
Start: 2025-03-06 | End: 2025-03-08 | Stop reason: HOSPADM

## 2025-03-06 RX ORDER — BUPIVACAINE HYDROCHLORIDE 7.5 MG/ML
INJECTION, SOLUTION INTRASPINAL AS NEEDED
Status: DISCONTINUED | OUTPATIENT
Start: 2025-03-06 | End: 2025-03-06 | Stop reason: SURG

## 2025-03-06 RX ORDER — METOCLOPRAMIDE HYDROCHLORIDE 5 MG/ML
INJECTION INTRAMUSCULAR; INTRAVENOUS AS NEEDED
Status: DISCONTINUED | OUTPATIENT
Start: 2025-03-06 | End: 2025-03-06 | Stop reason: SURG

## 2025-03-06 RX ORDER — KETOROLAC TROMETHAMINE 30 MG/ML
30 INJECTION, SOLUTION INTRAMUSCULAR; INTRAVENOUS EVERY 6 HOURS PRN
Status: DISCONTINUED | OUTPATIENT
Start: 2025-03-06 | End: 2025-03-06

## 2025-03-06 RX ORDER — KETOROLAC TROMETHAMINE 30 MG/ML
30 INJECTION, SOLUTION INTRAMUSCULAR; INTRAVENOUS ONCE
Status: COMPLETED | OUTPATIENT
Start: 2025-03-06 | End: 2025-03-06

## 2025-03-06 RX ORDER — ENOXAPARIN SODIUM 100 MG/ML
40 INJECTION SUBCUTANEOUS EVERY 24 HOURS
Status: DISCONTINUED | OUTPATIENT
Start: 2025-03-07 | End: 2025-03-08 | Stop reason: HOSPADM

## 2025-03-06 RX ORDER — ALUMINA, MAGNESIA, AND SIMETHICONE 2400; 2400; 240 MG/30ML; MG/30ML; MG/30ML
15 SUSPENSION ORAL EVERY 4 HOURS PRN
Status: DISCONTINUED | OUTPATIENT
Start: 2025-03-06 | End: 2025-03-08 | Stop reason: HOSPADM

## 2025-03-06 RX ORDER — SIMETHICONE 80 MG
80 TABLET,CHEWABLE ORAL 4 TIMES DAILY PRN
Status: DISCONTINUED | OUTPATIENT
Start: 2025-03-06 | End: 2025-03-08 | Stop reason: HOSPADM

## 2025-03-06 RX ORDER — DOCUSATE SODIUM 100 MG/1
100 CAPSULE, LIQUID FILLED ORAL 2 TIMES DAILY PRN
Status: DISCONTINUED | OUTPATIENT
Start: 2025-03-06 | End: 2025-03-08 | Stop reason: HOSPADM

## 2025-03-06 RX ORDER — PROMETHAZINE HYDROCHLORIDE 12.5 MG/1
12.5 SUPPOSITORY RECTAL EVERY 6 HOURS PRN
Status: DISCONTINUED | OUTPATIENT
Start: 2025-03-06 | End: 2025-03-08 | Stop reason: HOSPADM

## 2025-03-06 RX ORDER — BUPIVACAINE HCL/0.9 % NACL/PF 0.125 %
PLASTIC BAG, INJECTION (ML) EPIDURAL AS NEEDED
Status: DISCONTINUED | OUTPATIENT
Start: 2025-03-06 | End: 2025-03-06 | Stop reason: SURG

## 2025-03-06 RX ORDER — DIPHENHYDRAMINE HCL 25 MG
25 CAPSULE ORAL EVERY 4 HOURS PRN
Status: DISCONTINUED | OUTPATIENT
Start: 2025-03-06 | End: 2025-03-08 | Stop reason: HOSPADM

## 2025-03-06 RX ORDER — SODIUM CHLORIDE 9 MG/ML
40 INJECTION, SOLUTION INTRAVENOUS AS NEEDED
Status: DISCONTINUED | OUTPATIENT
Start: 2025-03-06 | End: 2025-03-08 | Stop reason: HOSPADM

## 2025-03-06 RX ORDER — ACETAMINOPHEN 500 MG
1000 TABLET ORAL ONCE
Status: COMPLETED | OUTPATIENT
Start: 2025-03-06 | End: 2025-03-06

## 2025-03-06 RX ORDER — OXYCODONE HYDROCHLORIDE 10 MG/1
10 TABLET ORAL EVERY 4 HOURS PRN
Status: DISCONTINUED | OUTPATIENT
Start: 2025-03-06 | End: 2025-03-08 | Stop reason: HOSPADM

## 2025-03-06 RX ORDER — ONDANSETRON 4 MG/1
4 TABLET, ORALLY DISINTEGRATING ORAL EVERY 8 HOURS PRN
Status: DISCONTINUED | OUTPATIENT
Start: 2025-03-06 | End: 2025-03-08 | Stop reason: HOSPADM

## 2025-03-06 RX ORDER — CALCIUM CARBONATE 500 MG/1
1 TABLET, CHEWABLE ORAL EVERY 4 HOURS PRN
Status: DISCONTINUED | OUTPATIENT
Start: 2025-03-06 | End: 2025-03-08 | Stop reason: HOSPADM

## 2025-03-06 RX ORDER — OXYTOCIN/0.9 % SODIUM CHLORIDE 30/500 ML
125 PLASTIC BAG, INJECTION (ML) INTRAVENOUS ONCE AS NEEDED
Status: DISCONTINUED | OUTPATIENT
Start: 2025-03-06 | End: 2025-03-08 | Stop reason: HOSPADM

## 2025-03-06 RX ORDER — NALOXONE HCL 0.4 MG/ML
0.4 VIAL (ML) INJECTION
Status: ACTIVE | OUTPATIENT
Start: 2025-03-06 | End: 2025-03-07

## 2025-03-06 RX ORDER — HYDROCORTISONE 25 MG/G
1 CREAM TOPICAL AS NEEDED
Status: DISCONTINUED | OUTPATIENT
Start: 2025-03-06 | End: 2025-03-08 | Stop reason: HOSPADM

## 2025-03-06 RX ORDER — OXYTOCIN 10 [USP'U]/ML
INJECTION, SOLUTION INTRAMUSCULAR; INTRAVENOUS AS NEEDED
Status: DISCONTINUED | OUTPATIENT
Start: 2025-03-06 | End: 2025-03-06 | Stop reason: SURG

## 2025-03-06 RX ORDER — PRENATAL VIT/IRON FUM/FOLIC AC 27MG-0.8MG
1 TABLET ORAL DAILY
Status: DISCONTINUED | OUTPATIENT
Start: 2025-03-06 | End: 2025-03-08 | Stop reason: HOSPADM

## 2025-03-06 RX ORDER — CARBOPROST TROMETHAMINE 250 UG/ML
250 INJECTION, SOLUTION INTRAMUSCULAR AS NEEDED
Status: DISCONTINUED | OUTPATIENT
Start: 2025-03-06 | End: 2025-03-08 | Stop reason: HOSPADM

## 2025-03-06 RX ORDER — FAMOTIDINE 10 MG/ML
INJECTION, SOLUTION INTRAVENOUS AS NEEDED
Status: DISCONTINUED | OUTPATIENT
Start: 2025-03-06 | End: 2025-03-06 | Stop reason: SURG

## 2025-03-06 RX ORDER — METHYLERGONOVINE MALEATE 0.2 MG/ML
200 INJECTION INTRAVENOUS AS NEEDED
Status: DISCONTINUED | OUTPATIENT
Start: 2025-03-06 | End: 2025-03-08 | Stop reason: HOSPADM

## 2025-03-06 RX ADMIN — PROMETHAZINE HYDROCHLORIDE 12.5 MG: 25 INJECTION, SOLUTION INTRAMUSCULAR; INTRAVENOUS at 19:08

## 2025-03-06 RX ADMIN — ACETAMINOPHEN 1000 MG: 500 TABLET ORAL at 15:34

## 2025-03-06 RX ADMIN — SODIUM CHLORIDE, SODIUM LACTATE, POTASSIUM CHLORIDE, CALCIUM CHLORIDE: 20; 30; 600; 310 INJECTION, SOLUTION INTRAVENOUS at 11:18

## 2025-03-06 RX ADMIN — FENTANYL CITRATE 80 MCG: 50 INJECTION, SOLUTION INTRAMUSCULAR; INTRAVENOUS at 11:33

## 2025-03-06 RX ADMIN — DOCUSATE SODIUM 100 MG: 100 CAPSULE, LIQUID FILLED ORAL at 22:42

## 2025-03-06 RX ADMIN — FAMOTIDINE 20 MG: 10 INJECTION, SOLUTION INTRAVENOUS at 10:52

## 2025-03-06 RX ADMIN — Medication 500 ML: at 11:53

## 2025-03-06 RX ADMIN — SODIUM CHLORIDE 2000 MG: 900 INJECTION INTRAVENOUS at 10:24

## 2025-03-06 RX ADMIN — Medication 200 MCG: at 11:33

## 2025-03-06 RX ADMIN — SODIUM CITRATE AND CITRIC ACID MONOHYDRATE 30 ML: 500; 334 SOLUTION ORAL at 10:23

## 2025-03-06 RX ADMIN — METOCLOPRAMIDE 10 MG: 5 INJECTION, SOLUTION INTRAMUSCULAR; INTRAVENOUS at 11:04

## 2025-03-06 RX ADMIN — SODIUM CHLORIDE, SODIUM LACTATE, POTASSIUM CHLORIDE, CALCIUM CHLORIDE 125 ML/HR: 20; 30; 600; 310 INJECTION, SOLUTION INTRAVENOUS at 10:04

## 2025-03-06 RX ADMIN — ACETAMINOPHEN 1000 MG: 500 TABLET, FILM COATED ORAL at 09:59

## 2025-03-06 RX ADMIN — Medication 200 MCG: at 11:12

## 2025-03-06 RX ADMIN — MORPHINE SULFATE 150 MCG: 0.5 INJECTION, SOLUTION EPIDURAL; INTRATHECAL; INTRAVENOUS at 10:57

## 2025-03-06 RX ADMIN — OXYTOCIN 10 UNITS: 10 INJECTION INTRAVENOUS at 11:17

## 2025-03-06 RX ADMIN — KETOROLAC TROMETHAMINE 15 MG: 15 INJECTION, SOLUTION INTRAMUSCULAR; INTRAVENOUS at 19:08

## 2025-03-06 RX ADMIN — Medication 200 MCG: at 11:02

## 2025-03-06 RX ADMIN — BUPIVACAINE HYDROCHLORIDE IN DEXTROSE 1.4 ML: 7.5 INJECTION, SOLUTION SUBARACHNOID at 10:57

## 2025-03-06 RX ADMIN — ONDANSETRON 4 MG: 4 TABLET, ORALLY DISINTEGRATING ORAL at 15:35

## 2025-03-06 RX ADMIN — ATROPINE SULFATE 0.1 MG: 0.4 INJECTION, SOLUTION INTRAVENOUS at 11:02

## 2025-03-06 RX ADMIN — FENTANYL CITRATE 20 MCG: 50 INJECTION, SOLUTION INTRAMUSCULAR; INTRAVENOUS at 10:57

## 2025-03-06 RX ADMIN — MIDAZOLAM HYDROCHLORIDE 1 MG: 1 INJECTION, SOLUTION INTRAMUSCULAR; INTRAVENOUS at 10:52

## 2025-03-06 RX ADMIN — KETOROLAC TROMETHAMINE 30 MG: 30 INJECTION, SOLUTION INTRAMUSCULAR; INTRAVENOUS at 13:52

## 2025-03-06 RX ADMIN — ACETAMINOPHEN 1000 MG: 500 TABLET ORAL at 22:42

## 2025-03-06 RX ADMIN — ONDANSETRON 4 MG: 2 INJECTION INTRAMUSCULAR; INTRAVENOUS at 10:52

## 2025-03-06 RX ADMIN — SIMETHICONE 80 MG: 80 TABLET, CHEWABLE ORAL at 22:42

## 2025-03-06 NOTE — LACTATION NOTE
03/06/25 1600   Maternal Information   Date of Referral 03/06/25   Person Making Referral lactation consultant  (Courtesy visit for new delivery. Pt reports infant had a good feeding earlier but only from the R breast. She states baby is having trouble gettting infant on L side as the nipple is more flat.)   Maternal Reason for Referral no prior breastfeeding experience  (Patient has an adoptive 7 year old child)   Maternal Assessment   Breast Size Issue none   Breast Shape Bilateral:;round   Breast Density Bilateral:;soft   Nipples Left:;flat;Right:;everted   Left Nipple Symptoms intact;nontender   Right Nipple Symptoms intact;nontender   Maternal Infant Feeding   Maternal Emotional State receptive;relaxed   Infant Positioning   (multiple attempts made to latch infant however he was too sleepy, reluctant to nurse at this time. Infant placed skin to skin. Pt encouraged to try & latch him using nipple shield when he shows feeding cues.)   Latch Assistance full assistance needed   Support Person Involvement verbally supports mother   Milk Expression/Equipment   Breast Pump Type   (Pt has a Lansinoh pump at bedside.)

## 2025-03-06 NOTE — PROCEDURES
Insertion of Cervical Dilator    Date/Time: 3/6/2025 7:55 AM    Performed by: Paloma Faulkner MD  Authorized by: Paloma Faulkner MD  Consent: Verbal consent obtained.  Risks and benefits: risks, benefits and alternatives were discussed  Consent given by: patient  Patient understanding: patient states understanding of the procedure being performed  Patient identity confirmed: verbally with patient  Comments: Vertex presentation was confirmed with handheld ultrasound.  Patient was placed in dorsolithotomy position a medium lighted speculum was inserted into the vagina patient expressed excruciating pain having to wait several minutes before opening the speculum.  After attempting to open the speculum patient was in tears and patient's partner asked if we could stop.  Removed the speculum and requested a small metal speculum.  Small medical speculum was inserted into the vagina patient still expressed extreme discomfort.  Patient's partner is concerned about her level of pain.  Patient states that her pelvis is very narrow and stated that her physician discussed her likely having the need for a  delivery.  Patient and her partner discussed proceeding with a  delivery instead of induction of labor if she was going to end up with one of the long run.  Discussed with patient possibility of placing Moncada manually without speculum.  During still her cervical exam pelvimetry was performed.  Per my exam patient was found to have cephalopelvic  disproportion as her interspinous diameter was less than 10 cm..   Patient and her partner are adamant they would like to delay induction and desire to speak with their physician about proceeding with a  given her extreme discomfort.  States several times that her provider informed her that she would likely need a  if she ever got pregnant.  Plan to call provider in the morning to inform her of patient's objection to Moncada placement.

## 2025-03-06 NOTE — ANESTHESIA PROCEDURE NOTES
Spinal Block      Patient reassessed immediately prior to procedure    Patient location during procedure: OR  Indication:at surgeon's request  Performed By  CRNA/BETH: Sherie Bernard CRNA  Preanesthetic Checklist  Completed: patient identified, IV checked, site marked, risks and benefits discussed, surgical consent, monitors and equipment checked, pre-op evaluation and timeout performed  Spinal Block Prep:  Patient Position:sitting  Sterile Tech:cap, gloves, mask and sterile barriers  Prep:Betadine  Patient Monitoring:blood pressure monitoring, continuous pulse oximetry and EKG    Spinal Block Procedure  Approach:midline  Guidance:palpation technique  Location:L3-L4  Needle Type:Nayan  Needle Gauge:25 G  Placement of Spinal needle event:cerebrospinal fluid aspirated  Paresthesia: no  Fluid Appearance:clear     Post Assessment  Patient Tolerance:patient tolerated the procedure well with no apparent complications  Complications no

## 2025-03-06 NOTE — OP NOTE
[facility]   Section Operative Note    Pre-Operative Dx:   1.  IUP at 37w0d  weeks     2.  Unfavorable cervix and pelvis.        Postoperative dx:    1.  Same     Procedure: Procedure(s):   SECTION PRIMARY   Surgeon: Tiana Gonzáles MD    Assistant: Surgeon(s):  Tiana Gonzáles MD        Anesthesia:     EBL:   200mls.           IV Fluids: 1500 mls.   UOP: 100 mls.       Antibiotics: cefazolin (Ancef)     Infant:            Gender: male infant    Weight: 2390 g (5 lb 4.3 oz)    Apgars: 8  @ 1 minute /     9  @ 5 minutes    Fetal presentation: cephalic   Amniotic fluid: Clear      Complications:   None      Disposition:   Mother to Mother Baby/Postpartum  in stable condition currently.   Baby to NBN  in stable condition currently.       Procedure:   The patient was taken to the operating room where spinal anesthesia was placed, and she was placed in supine position with a leftward tilt. Sequential compression devices on bilateral lower extremities and a Moncada catheter placed in her bladder. After being prepped and draped in a normal sterile fashion, a Pfannenstiel skin incision was made with a scalpel and taken down to the level of the fascia using the Bovie. The fascia was incised in the midline and extended laterally. The superior edge of the fascia was grasped with Kocher clamps, elevated and the rectus muscle dissected off. In a similar fashion, the inferior edge of the fascia was grasped with Kocher clamps, elevated and the rectus muscles dissected off. The rectus muscles were bisected in the midline. The peritoneum was identified, grasped and entered into sharply using Metzenbaum scissors. This incision was extended superiorly and inferiorly with good visualization of the bladder. Bladder blade was placed. A bladder flap was created. A low transverse uterine incision was made with a scalpel and extended laterally. Amniotomy with clear fluid occurred at the time of hysterotomy. The  head was brought to the uterine incision, and using fundal pressure, the head delivered without complication. Nose and mouth were bulb suctioned.  Shoulders and body were to follow. Cord was milked x4, clamped x2 and cut. Infant was handed to the awaiting pediatric team.  Cord blood was obtained. The placenta was manually extracted. The uterus was exteriorized and cleared of all clot and debris and the hysterotomy site was closed with a 1-0 chromic in a running locked fashion starting at the left angle and moving towards the right. Copious irrigation behind the uterus ensued. The uterus was returned to the abdominal cavity. Paracolic gutters were cleared of all clot and debris. The hysterotomy site was noted to be hemostatic as well as the bladder flap and Interceed was placed over this. The peritoneum was reapproximated using a 2-0 chromic in a running fashion starting superiorly and moving inferiorly. Irrigation over the rectus muscles then occurred with Bovie cauterization of any bleeding sites. The fascia was reapproximated using a looped 0 PDS in a running fashion starting at the left angle and moving towards the right.  The subcutaneous fat layer was hemostatic and closed in an interrupted fashion with 2-0 Plain.  The skin was reapproximated with a 4-0 vicryl on a Uri needle. All sponge, lap, and needle counts were correct x2. The patient was taken to the recovery room in stable condition.             Tiana Gonzáles MD  3/6/2025  11:59 EST

## 2025-03-06 NOTE — PLAN OF CARE
Problem: Adult Inpatient Plan of Care  Goal: Plan of Care Review  Outcome: Progressing  Flowsheets (Taken 3/6/2025 1140)  Progress: improving  Outcome Evaluation: successful delivery by the end of the day  Plan of Care Reviewed With: patient  Goal: Patient-Specific Goal (Individualized)  Outcome: Progressing  Flowsheets (Taken 3/6/2025 1140)  Patient/Family-Specific Goals (Include Timeframe): successful delivery by the end of the day  Individualized Care Needs: care clustered, education provided, questions answered  Anxieties, Fears or Concerns: anxious of pain and c/section delivery  Goal: Absence of Hospital-Acquired Illness or Injury  Outcome: Progressing  Intervention: Identify and Manage Fall Risk  Recent Flowsheet Documentation  Taken 3/6/2025 1004 by Ania Klein RN  Safety Promotion/Fall Prevention: safety round/check completed  Taken 3/6/2025 0729 by Ania Klein RN  Safety Promotion/Fall Prevention:   assistive device/personal items within reach   clutter free environment maintained   nonskid shoes/slippers when out of bed   room organization consistent   safety round/check completed  Intervention: Prevent Skin Injury  Recent Flowsheet Documentation  Taken 3/6/2025 0729 by Ania Klein RN  Body Position: sitting up in bed  Intervention: Prevent Infection  Recent Flowsheet Documentation  Taken 3/6/2025 0729 by Ania Klein, RN  Infection Prevention:   equipment surfaces disinfected   hand hygiene promoted   personal protective equipment utilized   rest/sleep promoted   single patient room provided   visitors restricted/screened  Goal: Optimal Comfort and Wellbeing  Outcome: Progressing  Intervention: Provide Person-Centered Care  Recent Flowsheet Documentation  Taken 3/6/2025 1004 by Ania Klein RN  Trust Relationship/Rapport:   care explained   choices provided   questions answered   questions encouraged   reassurance provided   thoughts/feelings acknowledged  Taken 3/6/2025 0729 by  Ania Klein RN  Trust Relationship/Rapport:   care explained   choices provided   questions answered   questions encouraged   reassurance provided   thoughts/feelings acknowledged  Goal: Readiness for Transition of Care  Outcome: Progressing     Problem: Labor  Goal: Hemostasis  Outcome: Progressing  Goal: Stable Fetal Wellbeing  Outcome: Progressing  Intervention: Promote and Monitor Fetal Wellbeing  Recent Flowsheet Documentation  Taken 3/6/2025 0729 by Ania Klein RN  Body Position: sitting up in bed  Goal: Effective Progression to Delivery  Outcome: Progressing  Goal: Absence of Infection Signs and Symptoms  Outcome: Progressing  Intervention: Prevent or Manage Infection  Recent Flowsheet Documentation  Taken 3/6/2025 0729 by Ania Klein RN  Infection Prevention:   equipment surfaces disinfected   hand hygiene promoted   personal protective equipment utilized   rest/sleep promoted   single patient room provided   visitors restricted/screened  Goal: Acceptable Pain Control  Outcome: Progressing  Goal: Normal Uterine Contraction Pattern  Outcome: Progressing     Problem:  Delivery  Goal: Bleeding is Controlled  Outcome: Progressing  Goal: Stable Fetal Wellbeing  Outcome: Progressing  Intervention: Promote and Monitor Fetal Wellbeing  Recent Flowsheet Documentation  Taken 3/6/2025 0729 by Ania Klein RN  Body Position: sitting up in bed  Goal: Absence of Infection Signs and Symptoms  Outcome: Progressing  Intervention: Prevent or Manage Infection  Recent Flowsheet Documentation  Taken 3/6/2025 0729 by Ania Klein RN  Infection Prevention:   equipment surfaces disinfected   hand hygiene promoted   personal protective equipment utilized   rest/sleep promoted   single patient room provided   visitors restricted/screened  Goal: Effective Oxygenation and Ventilation  Outcome: Progressing   Goal Outcome Evaluation:  Plan of Care Reviewed With: patient        Progress: improving  Outcome  Evaluation: successful delivery by the end of the day

## 2025-03-06 NOTE — ANESTHESIA PREPROCEDURE EVALUATION
Anesthesia Evaluation     Patient summary reviewed and Nursing notes reviewed   NPO Solid Status: > 6 hours  NPO Liquid Status: < 2 hours           Airway   Mallampati: II  TM distance: >3 FB  Neck ROM: full  Dental      Pulmonary    (+) asthma,  Cardiovascular - negative cardio ROS        Neuro/Psych  (+) headaches, psychiatric history Anxiety and Depression  GI/Hepatic/Renal/Endo    (+) obesity    Musculoskeletal (-) negative ROS    Abdominal    Substance History - negative use     OB/GYN    (+) Pregnant        Other - negative ROS                   Anesthesia Plan    ASA 2     spinal and ITN       Anesthetic plan, risks, benefits, and alternatives have been provided, discussed and informed consent has been obtained with: patient.    Use of blood products discussed with patient .    Plan discussed with attending.    CODE STATUS:    Level Of Support Discussed With: Patient  Code Status (Patient has no pulse and is not breathing): CPR (Attempt to Resuscitate)  Medical Interventions (Patient has pulse or is breathing): Full Support

## 2025-03-06 NOTE — ANESTHESIA POSTPROCEDURE EVALUATION
Patient: Brenda Trejo    Procedure Summary       Date: 25 Room / Location: [facility] LABOR DELIVERY   [facility] LABOR DELIVERY    Anesthesia Start: 1051 Anesthesia Stop: 1158    Procedure:  SECTION PRIMARY (Abdomen) Diagnosis:     Surgeons: Tiana Gonzáles MD Provider: Janelle Smith DO    Anesthesia Type: spinal, ITN ASA Status: 2            Anesthesia Type: spinal, ITN    Vitals  72  16  111/64  97.9  100%      Post Anesthesia Care and Evaluation    Patient location during evaluation: bedside  Patient participation: complete - patient participated  Level of consciousness: awake and alert  Pain management: adequate    Airway patency: patent  Anesthetic complications: No anesthetic complications    Cardiovascular status: acceptable  Respiratory status: acceptable  Hydration status: acceptable

## 2025-03-06 NOTE — H&P
History and Physical:    Subjective     No chief complaint on file.      Brenda Trejo is a 39 y.o. year old  with an Estimated Date of Delivery: 3/27/25 currently at 37w0d presenting with  intrauterine growth restriction.  Patient presented for induction of labor.  She received Cervidil.  Laborist unable to place speculum in order to place cervical Moncada.  She discussed with patient concern for narrow pelvis.  Patient elected for out right  section after this interaction.  Discussion with patient regarding trying to move forward with induction of labor today.  Patient is nervous and uncomfortable and desiring out right  section at this point. .    Prenatal care has been with Tiana Gonzáles MD.  It has been significant for  IUGR, AMA .      Review of Systems   Constitutional: Negative.    HENT: Negative.     Respiratory: Negative.     Cardiovascular: Negative.    Gastrointestinal: Negative.    Genitourinary: Negative.    Musculoskeletal: Negative.    Skin: Negative.    Allergic/Immunologic: Negative.    Neurological: Negative.    Hematological: Negative.    Psychiatric/Behavioral: Negative.             Past Medical History:   Diagnosis Date    Abnormal Pap smear of cervix     Allergic rhinitis     Anxiety     Asthma     controlled    C. difficile diarrhea     10/2021    Chronic back pain 2010    r/t trauma, failed back surgery x 2, denies NSAIDS/steroids/narcotics    Claustrophobia     Depression     Dyspepsia     Dyspnea on exertion     Eczema     on Dupixent    Fatigue     Gout     Migraine     Morbid obesity     Urinary tract infection      Past Surgical History:   Procedure Laterality Date    BREAST BIOPSY  10/2019    CARPAL TUNNEL RELEASE Bilateral     COLONOSCOPY      GASTRIC SLEEVE LAPAROSCOPIC N/A 2023    Procedure: GASTRIC SLEEVE LAPAROSCOPIC WITH DAVINCI ROBOT WITH ESOPHAGOGASTRODUODENOSCOPY;  Surgeon: Ashley Leong MD;  Location: [facility] OR;   Service: Robotics - DaVinci;  Laterality: N/A;    LUMBAR DISCECTOMY Left 12/23/2021    Procedure: LUMBAR MICRODISCECTOMY L5-S1;  Surgeon: James Estrada MD;  Location: [facility];  Service: Neurosurgery;  Laterality: Left;    MOUTH SURGERY  2007    Gum Grafts    TONSILLECTOMY AND ADENOIDECTOMY  2021    TRIGGER FINGER RELEASE Right 2021    WISDOM TOOTH EXTRACTION  2007     Family History   Problem Relation Age of Onset    Melanoma Father     Drug abuse Father     Breast cancer Mother     Colon cancer Mother     Stroke Maternal Grandfather     Diabetes Maternal Grandfather     Arthritis Maternal Grandfather     Breast cancer Maternal Aunt     Diabetes Maternal Aunt     Ovarian cancer Neg Hx     Uterine cancer Neg Hx     Prostate cancer Neg Hx     Deep vein thrombosis Neg Hx     Pulmonary embolism Neg Hx     Coronary artery disease Neg Hx     Osteoporosis Neg Hx     Hypertension Neg Hx      Social History     Tobacco Use    Smoking status: Never     Passive exposure: Never    Smokeless tobacco: Never   Vaping Use    Vaping status: Never Used   Substance Use Topics    Alcohol use: Not Currently     Alcohol/week: 1.0 standard drink of alcohol     Types: 1 Glasses of wine per week     Comment: rarely, every 3 months    Drug use: Never     Medications Prior to Admission   Medication Sig Dispense Refill Last Dose/Taking    Calcium 250 MG capsule Take  by mouth.   3/5/2025 Evening    famotidine (PEPCID) 20 MG tablet Take 1 tablet by mouth 2 (Two) Times a Day. 20 tablet 0 Past Month Evening    ferrous sulfate 325 (65 FE) MG tablet Take 1 tablet by mouth Daily With Breakfast.   3/4/2025 Evening    multivitamin with minerals tablet tablet Take 1 tablet by mouth Daily.   3/5/2025 Morning    Omega-3 Fatty Acids (fish oil) 1000 MG capsule capsule Take  by mouth Daily With Breakfast.   3/5/2025 Morning    ondansetron ODT (ZOFRAN-ODT) 4 MG disintegrating tablet Take 1 tablet by mouth Every 8 (Eight) Hours As Needed for  "Nausea or Vomiting. 30 tablet 1 Past Month    Prenatal Vit-Fe Fumarate-FA (prenatal vitamin 27-0.8) 27-0.8 MG tablet tablet Take  by mouth Daily.   3/5/2025 Morning    promethazine (PHENERGAN) 12.5 MG tablet Take 1 tablet by mouth Every 6 (Six) Hours As Needed for Nausea or Vomiting. 20 tablet 0 Past Month    sucralfate (CARAFATE) 1 g tablet Take 1 tablet by mouth 4 (Four) Times a Day Before Meals & at Bedtime. (Patient taking differently: Take 1 tablet by mouth As Needed.) 60 tablet 0 Past Month    thiamine (VITAMIN B-1) 100 MG tablet  tablet Take 1 tablet by mouth Daily.   3/5/2025 Evening    vitamin B-12 (CYANOCOBALAMIN) 500 MCG tablet Take 1 tablet by mouth Daily.   3/5/2025 Evening    vitamin B-6 (PYRIDOXINE) 50 MG tablet Take 1 tablet by mouth Daily.   3/5/2025 Morning     Allergies:  Penicillins and Sulfa antibiotics  OB History    Para Term  AB Living   1 0 0 0 0 0   SAB IAB Ectopic Molar Multiple Live Births   0 0 0 0 0 0      # Outcome Date GA Lbr Geovany/2nd Weight Sex Type Anes PTL Lv   1 Current                      Objective     /93   Pulse 64   Temp 97.8 °F (36.6 °C) (Oral)   Resp 16   Ht 160 cm (63\")   Wt 96.2 kg (212 lb)   LMP 2024 (Exact Date)   BMI 37.55 kg/m²     Physical Exam    General:  No acute distress   Lung: Clear to auscultation bilaterally, no wheezing, clear at bases   Heart: Regular rate and rhythm, no murmurs    Abdomen: Gravid, nontender   Extremities: 2+ DTR's bilaterally, no edema   FHT's: reactive    Cervix: Closed, 48, -2   [place]: Contraction are rare           Lab Review   External Prenatal Results       Pregnancy Outside Results - Transcribed From Office Records - See Scanned Records For Details       Test Value Date Time    ABO  AB  25    Rh  Positive  25    Antibody Screen  Negative  25       Negative  01/10/25 0235       Negative  25 0943       Negative  08/15/24 1035    Varicella IgG       Rubella  1.86 " index 08/15/24 1035    Hgb  12.3 g/dL 03/05/25 1908       11.8 g/dL 01/30/25 1013       11.7 g/dL 01/10/25 0235       11.2 g/dL 01/02/25 0943       11.4 g/dL 09/24/24 2005       12.2 g/dL 08/15/24 1035       12.9 g/dL 08/11/24 0827    Hct  35.0 % 03/05/25 1908       36.0 % 01/30/25 1013       33.6 % 01/10/25 0235       34.2 % 01/02/25 0943       33.4 % 09/24/24 2005       37.5 % 08/15/24 1035       37.9 % 08/11/24 0827    HgB A1c   4.90 % 08/15/24 1035    1h GTT  48 mg/dL 01/02/25 0943       100 mg/dL 09/12/24 1133    3h GTT Fasting       3h GTT 1 hour       3h GTT 2 hour       3h GTT 3 hour        Gonorrhea (discrete)  Negative  08/15/24 1035    Chlamydia (discrete)  Negative  08/15/24 1035    RPR  Non Reactive  01/02/25 0943       Non Reactive  08/15/24 1035    Syphils cascade: TP-Ab (FTA)  Non-Reactive  03/05/25 1908    TP-Ab  Non-Reactive  03/05/25 1908    TP-Ab (EIA)       TPPA       HBsAg  Negative  08/15/24 1035    Herpes Simplex Virus PCR       Herpes Simplex VIrus Culture       HIV  Non Reactive  08/15/24 1035    Hep C RNA Quant PCR       Hep C Antibody  Non Reactive  08/15/24 1035    AFP       NIPT       Cystic Fibrosis ([place])       Cystic Fibroisis        Spinal Muscular atrophy       Fragile X       Group B Strep  No Group B Streptococcus isolated  02/24/25 1820    GBS Susceptibility to Clindamycin       GBS Susceptibility to Erythromycin       Fetal Fibronectin       Genetic Testing, Maternal Blood                 Drug Screening       Test Value Date Time    Urine Drug Screen       Amphetamine Screen       Barbiturate Screen       Benzodiazepine Screen       Methadone Screen       Phencyclidine Screen       Opiates Screen       THC Screen       Cocaine Screen       Propoxyphene Screen       Buprenorphine Screen       Methamphetamine Screen       Oxycodone Screen       Tricyclic Antidepressants Screen                 Legend    ^: Historical                                Lab Results   Component Value  Date    ALKPHOS 121 (H) 03/05/2025    ALT 10 03/05/2025    AST 22 03/05/2025    CREATININE 0.77 03/05/2025    BILITOT 0.2 03/05/2025     03/05/2025    URICACID 4.9 03/05/2025     Lab Results   Component Value Date    WBC 10.61 03/05/2025    HGB 12.3 03/05/2025    HCT 35.0 03/05/2025    MCV 89.3 03/05/2025     03/05/2025        No results found for this or any previous visit.              Patient Active Problem List    Diagnosis Date Noted    *IUGR (intrauterine growth restriction) affecting care of mother 03/05/2025    Fetal renal anomaly, single gestation 02/27/2025    Poor fetal growth affecting management of mother in third trimester 02/06/2025     Note Last Updated: 2/20/2025     Plan for weekly Dopplers with PDC on Thursdays and NSTs with us on Mondays.  Plan for delivery at 37 weeks.  AC 3rd to 4th percentile and overall growth 10th percentile at 35 weeks.  Recommend delivery between 37 and 38 weeks.  IOL 3/6/2025 at 1 AM      Hypokalemia 01/10/2025    Hypomagnesemia 01/10/2025    AMA (advanced maternal age) multigravida 35+ 08/15/2024     Note Last Updated: 8/27/2024     cfDNA negative.  Boy!      Prenatal care 08/15/2024    Obesity in pregnancy, antepartum 08/15/2024     Note Last Updated: 2/10/2025     Hgb A1C at NOB 4.9  Early 1hr gtt at 2nd visit 100  Discussed carbohydrate control  Recommend limiting weight gain to 15-20lbs          History of gastric surgery 08/15/2024     Note Last Updated: 8/15/2024     History of gastric sleeve in 2023      Eczema 07/13/2023     Note Last Updated: 7/13/2023     on Dupixent      Anxiety 07/13/2023    Asthma 07/13/2023     Note Last Updated: 7/13/2023     controlled      Lumbar herniated disc     Family history of breast cancer in mother 10/11/2021     Note Last Updated: 10/11/2021     Age 61      Chronic back pain 2010     Note Last Updated: 7/13/2023     r/t trauma, failed back surgery x 2          Assessment & Plan     ASSESSMENT  IUP at 37w0d  Growth  restriction  AMA  Requesting out right  section   3. GBBS negative    PLAN  Admit to labor and delivery   2.   Proceed with primary  section         Tiana Gonzáles MD  3/6/2025@

## 2025-03-07 LAB
BASOPHILS # BLD AUTO: 0.02 10*3/MM3 (ref 0–0.2)
BASOPHILS NFR BLD AUTO: 0.2 % (ref 0–1.5)
DEPRECATED RDW RBC AUTO: 45 FL (ref 37–54)
EOSINOPHIL # BLD AUTO: 0.09 10*3/MM3 (ref 0–0.4)
EOSINOPHIL NFR BLD AUTO: 0.8 % (ref 0.3–6.2)
ERYTHROCYTE [DISTWIDTH] IN BLOOD BY AUTOMATED COUNT: 13.3 % (ref 12.3–15.4)
HCT VFR BLD AUTO: 29.1 % (ref 34–46.6)
HGB BLD-MCNC: 9.7 G/DL (ref 12–15.9)
IMM GRANULOCYTES # BLD AUTO: 0.05 10*3/MM3 (ref 0–0.05)
IMM GRANULOCYTES NFR BLD AUTO: 0.4 % (ref 0–0.5)
LYMPHOCYTES # BLD AUTO: 1.68 10*3/MM3 (ref 0.7–3.1)
LYMPHOCYTES NFR BLD AUTO: 14.4 % (ref 19.6–45.3)
MCH RBC QN AUTO: 31.1 PG (ref 26.6–33)
MCHC RBC AUTO-ENTMCNC: 33.3 G/DL (ref 31.5–35.7)
MCV RBC AUTO: 93.3 FL (ref 79–97)
MONOCYTES # BLD AUTO: 0.45 10*3/MM3 (ref 0.1–0.9)
MONOCYTES NFR BLD AUTO: 3.8 % (ref 5–12)
NEUTROPHILS NFR BLD AUTO: 80.4 % (ref 42.7–76)
NEUTROPHILS NFR BLD AUTO: 9.41 10*3/MM3 (ref 1.7–7)
NRBC BLD AUTO-RTO: 0 /100 WBC (ref 0–0.2)
PLATELET # BLD AUTO: 182 10*3/MM3 (ref 140–450)
PMV BLD AUTO: 9.6 FL (ref 6–12)
RBC # BLD AUTO: 3.12 10*6/MM3 (ref 3.77–5.28)
WBC NRBC COR # BLD AUTO: 11.7 10*3/MM3 (ref 3.4–10.8)

## 2025-03-07 PROCEDURE — 85025 COMPLETE CBC W/AUTO DIFF WBC: CPT | Performed by: OBSTETRICS & GYNECOLOGY

## 2025-03-07 PROCEDURE — 25010000002 KETOROLAC TROMETHAMINE PER 15 MG: Performed by: OBSTETRICS & GYNECOLOGY

## 2025-03-07 PROCEDURE — 25010000002 ENOXAPARIN PER 10 MG: Performed by: OBSTETRICS & GYNECOLOGY

## 2025-03-07 RX ADMIN — KETOROLAC TROMETHAMINE 15 MG: 15 INJECTION, SOLUTION INTRAMUSCULAR; INTRAVENOUS at 12:21

## 2025-03-07 RX ADMIN — IBUPROFEN 600 MG: 600 TABLET, FILM COATED ORAL at 18:21

## 2025-03-07 RX ADMIN — ENOXAPARIN SODIUM 40 MG: 100 INJECTION SUBCUTANEOUS at 12:21

## 2025-03-07 RX ADMIN — DOCUSATE SODIUM 100 MG: 100 CAPSULE, LIQUID FILLED ORAL at 09:14

## 2025-03-07 RX ADMIN — ACETAMINOPHEN 650 MG: 325 TABLET, FILM COATED ORAL at 16:27

## 2025-03-07 RX ADMIN — ACETAMINOPHEN 1000 MG: 500 TABLET ORAL at 10:37

## 2025-03-07 RX ADMIN — OXYCODONE HYDROCHLORIDE 10 MG: 10 TABLET ORAL at 18:23

## 2025-03-07 RX ADMIN — OXYCODONE 5 MG: 5 TABLET ORAL at 04:42

## 2025-03-07 RX ADMIN — ACETAMINOPHEN 650 MG: 325 TABLET, FILM COATED ORAL at 21:07

## 2025-03-07 RX ADMIN — DOCUSATE SODIUM 100 MG: 100 CAPSULE, LIQUID FILLED ORAL at 21:07

## 2025-03-07 RX ADMIN — ACETAMINOPHEN 1000 MG: 500 TABLET ORAL at 04:41

## 2025-03-07 RX ADMIN — KETOROLAC TROMETHAMINE 15 MG: 15 INJECTION, SOLUTION INTRAMUSCULAR; INTRAVENOUS at 06:47

## 2025-03-07 RX ADMIN — SIMETHICONE 80 MG: 80 TABLET, CHEWABLE ORAL at 21:07

## 2025-03-07 RX ADMIN — KETOROLAC TROMETHAMINE 15 MG: 15 INJECTION, SOLUTION INTRAMUSCULAR; INTRAVENOUS at 00:45

## 2025-03-07 RX ADMIN — PRENATAL VITAMINS-IRON FUMARATE 27 MG IRON-FOLIC ACID 0.8 MG TABLET 1 TABLET: at 09:14

## 2025-03-07 NOTE — PROGRESS NOTES
3/7/2025    Name:Brenda Trejo    MR#:[id number]     PROGRESS NOTE:  Post-Op 1 S/P    HD:2    Subjective   39 y.o. yo Female  s/p CS at 37w0d doing well. Pain well controlled. Tolerating regular diet and having flatus. Lochia normal.       IUGR (intrauterine growth restriction) affecting care of mother    Delivery of pregnancy by  section       Objective    Vitals  Temp:  Temp:  [96.9 °F (36.1 °C)-98.4 °F (36.9 °C)] 98.2 °F (36.8 °C)  Temp src: Oral  BP:  BP: ()/(34-90) 122/64  Pulse:  Heart Rate:  [53-98] 62  RR:   Resp:  [15-18] 16    General Awake, alert, no distress  Abdomen Soft, non-distended, fundus firm, below umbilicus, appropriately tender  Incision  Intact, no erythema or exudate  Extremities Calves NT bilaterally     I/O last 3 completed shifts:  In: 1000 [I.V.:1000]  Out:  [Urine:1375; Blood:615]    LABS:   Lab Results   Component Value Date    WBC 10.61 2025    HGB 12.3 2025    HCT 35.0 2025    MCV 89.3 2025     2025       Infant: male      Assessment   1.  POD 1, doing well; am labs pending   2.  Obesity--- Lovenox qd    Plan: Routine postoperative care.  Advance.      Active Problems:   None      Jaimee Walls, APRN  3/7/2025 08:00 EST

## 2025-03-07 NOTE — LACTATION NOTE
03/07/25 1415   Maternal Information   Date of Referral 03/07/25   Person Making Referral nurse practitioner  (CARLTON Sims)   Maternal Reason for Referral no prior breastfeeding experience   Infant Reason for Referral 35-37 weeks gestation  (37 week - 5lb 4 ounce baby, IUGR)   Maternal Assessment   Breast Size Issue none   Breast Shape Bilateral:;pendulous   Breast Density Bilateral:;soft   Nipples Left:;flat;Right:;short  (right - very small nipple.  XS nipple shield utilized, could use smaller.)   Left Nipple Symptoms intact;nontender   Right Nipple Symptoms intact;nontender   Maternal Infant Feeding   Maternal Emotional State receptive;relaxed   Infant Positioning clutch/football;cross-cradle  (attempted in right football and left cross cradle.  Trialed an xs nipple shield in right football.  Infant initially latched with chin support but then fell asleep.)   Signs of Milk Transfer none noted   Pain with Feeding no   Latch Assistance full assistance needed   Support Person Involvement verbally supports mother   Additional Documentation Breastfeeding Supplementation (Group)   Breastfeeding Supplementation   Maternal Indication for Supplementation maternal request   Infant Indication for Supplementation maternal request   Breastfeeding Supplementation Type expressed breast milk;human donor milk   Method of Supplementation paced bottle;syringe   Nipple Used For Supplementation very slow flow   Milk Expression/Equipment   Breast Pump Type double electric, hospital grade;manual pump   Breast Pump Flange Type hard   Breast Pump Flange Size other (see comments)  (provided 18 flange, recommend flange inserts -)   Breast Pumping   Breast Pumping Interventions post-feed pumping encouraged  (to pump after every feed or attempt - 3 hours apart)   Lactation Referrals   Lactation Referrals outpatient lactation program  (after discharge as needed.)     Courtesy revisit at the request of pediatrician.  Infant has been  "receiving donor breast milk.  Discussed with patient to put infant to the breast every 3 hours or more often with feeding cues.  Trying for 10 minutes to latch infant.  Recommended use of an XS nipple shield.  Tried to latch in both football hold and cross cradle.  Infant did latch with chin support in right football hold, but no signs of active suckling.  Assist in showing patient how to do paced bottle feeding with chin support.  Infant had difficulty maintaining latch to nipple of the bottle, and was \"dribbling\" out formula.  Placed an speech consult, due to tight frenulum, dropping suction, small baby, and difficult feeder.  Encouraged patient to continue to pump after every feed using a 18 flange.  Recommended flange inserts for a smaller, closer fit with pumping.  Encouraged an outpatient lactation clinic appointment after discharge.   "

## 2025-03-07 NOTE — ANESTHESIA POSTPROCEDURE EVALUATION
Patient: Brenda Trejo    Procedure Summary       Date: 25 Room / Location:  [facility]    Anesthesia Start: 1051 Anesthesia Stop: 1158    Procedure:  SECTION PRIMARY (Abdomen) Diagnosis:     Surgeons: Tiana Gonzáles MD Provider: Janelle Smith DO    Anesthesia Type: spinal, ITN ASA Status: 2            Anesthesia Type: spinal, ITN    Vitals  Vitals Value Taken Time   /67 25 0715   Temp 98.2 °F (36.8 °C) 25 0715   Pulse 88 25 0715   Resp 18 25 0715   SpO2 100 % 25 1357           Post Anesthesia Care and Evaluation    Patient location during evaluation: bedside  Patient participation: complete - patient participated  Level of consciousness: awake and alert  Pain management: adequate    Airway patency: patent  Anesthetic complications: No anesthetic complications    Cardiovascular status: acceptable  Respiratory status: acceptable  Hydration status: acceptable  Post Neuraxial Block status: Motor and sensory function returned to baseline and No signs or symptoms of PDPH

## 2025-03-08 VITALS
HEART RATE: 87 BPM | SYSTOLIC BLOOD PRESSURE: 135 MMHG | OXYGEN SATURATION: 100 % | WEIGHT: 212 LBS | HEIGHT: 63 IN | BODY MASS INDEX: 37.56 KG/M2 | RESPIRATION RATE: 18 BRPM | TEMPERATURE: 98.2 F | DIASTOLIC BLOOD PRESSURE: 65 MMHG

## 2025-03-08 RX ORDER — IBUPROFEN 600 MG/1
600 TABLET, FILM COATED ORAL EVERY 6 HOURS
Qty: 60 TABLET | Refills: 0 | Status: SHIPPED | OUTPATIENT
Start: 2025-03-08

## 2025-03-08 RX ORDER — OXYCODONE HYDROCHLORIDE 5 MG/1
5 TABLET ORAL EVERY 6 HOURS PRN
Qty: 12 TABLET | Refills: 0 | Status: SHIPPED | OUTPATIENT
Start: 2025-03-08 | End: 2025-03-11

## 2025-03-08 RX ADMIN — OXYCODONE HYDROCHLORIDE 10 MG: 10 TABLET ORAL at 10:08

## 2025-03-08 RX ADMIN — IBUPROFEN 600 MG: 600 TABLET, FILM COATED ORAL at 00:21

## 2025-03-08 RX ADMIN — OXYCODONE HYDROCHLORIDE 10 MG: 10 TABLET ORAL at 05:30

## 2025-03-08 RX ADMIN — Medication 1 APPLICATION: at 01:30

## 2025-03-08 RX ADMIN — IBUPROFEN 600 MG: 600 TABLET, FILM COATED ORAL at 06:26

## 2025-03-08 RX ADMIN — PRENATAL VITAMINS-IRON FUMARATE 27 MG IRON-FOLIC ACID 0.8 MG TABLET 1 TABLET: at 08:19

## 2025-03-08 RX ADMIN — DOCUSATE SODIUM 100 MG: 100 CAPSULE, LIQUID FILLED ORAL at 08:19

## 2025-03-08 RX ADMIN — ACETAMINOPHEN 650 MG: 325 TABLET, FILM COATED ORAL at 10:08

## 2025-03-08 RX ADMIN — OXYCODONE HYDROCHLORIDE 10 MG: 10 TABLET ORAL at 00:21

## 2025-03-08 RX ADMIN — ACETAMINOPHEN 650 MG: 325 TABLET, FILM COATED ORAL at 03:22

## 2025-03-08 NOTE — DISCHARGE SUMMARY
Discharge Summary    Date of Admission: 3/5/2025  Date of Discharge:  3/8/2025      Patient: Brenda Trejo      MR#:[id number]    Primary Surgeon/OB: Tiana Gonzáles MD    Discharge Surgeon/OB:    Presenting Problem/History of Present Illness  IUGR (intrauterine growth restriction) affecting care of mother [O36.5990]       IUGR (intrauterine growth restriction) affecting care of mother    Delivery of pregnancy by  section        Discharge Diagnosis:  section at 37w0d    Procedures:  , Low Transverse    3/6/2025   11:16 AM     Feeding method: Breastfeeding Status: Yes    Rh Immune globulin given: not applicable    Rubella vaccine given: not applicable    Discharge Date: 3/8/2025; Discharge Time: 11:06 EST      Hospital Course  Patient is a 39 y.o. female  at 37w0d status post  section with uneventful postoperative recovery.  Patient was advanced to regular diet on postoperative day#1.  On discharge, ambulating, tolerating a regular diet without any difficulties and her incision is dry, clean and intact.     Infant:   male fetus 2390 g (5 lb 4.3 oz) with Apgar scores of 8 , 9  at five minutes.    Circumcision: yes    Condition on Discharge:  Stable    Vital Signs  Temp:  [97.9 °F (36.6 °C)-98.3 °F (36.8 °C)] 98.2 °F (36.8 °C)  Heart Rate:  [77-95] 87  Resp:  [16-18] 18  BP: (131-139)/(60-73) 135/65    Lab Results   Component Value Date    WBC 11.70 (H) 2025    HGB 9.7 (L) 2025    HCT 29.1 (L) 2025    MCV 93.3 2025     2025       Discharge Disposition  Home or Self Care    Discharge Medications     Discharge Medications        New Medications        Instructions Start Date   ibuprofen 600 MG tablet  Commonly known as: ADVIL,MOTRIN   600 mg, Oral, Every 6 Hours      oxyCODONE 5 MG immediate release tablet  Commonly known as: ROXICODONE   5 mg, Oral, Every 6 Hours PRN             Continue These Medications        Instructions Start  Date   prenatal vitamin 27-0.8 27-0.8 MG tablet tablet   Daily             Stop These Medications      Calcium 250 MG capsule     famotidine 20 MG tablet  Commonly known as: PEPCID     ferrous sulfate 325 (65 FE) MG tablet     fish oil 1000 MG capsule capsule     multivitamin with minerals tablet tablet     ondansetron ODT 4 MG disintegrating tablet  Commonly known as: ZOFRAN-ODT     promethazine 12.5 MG tablet  Commonly known as: PHENERGAN     sucralfate 1 g tablet  Commonly known as: CARAFATE     thiamine 100 MG tablet  tablet  Commonly known as: VITAMIN B-1     vitamin B-12 500 MCG tablet  Commonly known as: CYANOCOBALAMIN     vitamin B-6 50 MG tablet  Commonly known as: PYRIDOXINE              Discharge Diet:     Activity at Discharge:   Activity Instructions       Activity as Tolerated      Driving Restrictions      Type of Restriction: Driving    Driving Restrictions: No Driving (Time Limited)    Length: 2 Weeks    Gradually Increase Activity Until at Pre-Hospitalization Level      Pelvic Rest      Sexual Activity Restrictions      Type of Restriction: Sex    Explain Sexual Activity Restrictions: Nothing in the vagina for 6 weeks    Work Restrictions      Type of Restriction: Work    May Return to Work: Other    Return to Work Instructions: May return to work after 6 weeks            Follow-up Appointments  Future Appointments   Date Time Provider Department Center   4/21/2025 11:10 AM Tiana Gonzáles MD [facility]     Additional Instructions for the Follow-ups that You Need to Schedule       Call MD With Problems / Concerns   As directed      Instructions: Call for temp >/= 100.4, severe pain, severe bleeding, headache that does not improve with rest, medication, blurred vision, or postpartum depression. Monitor incision for signs of infection including, foul odor, discharge or separation of the wound.  Call for blood clots larger than a golf ball or saturating a pad in less than an hour.     Order Comments: Instructions: Call for temp >/= 100.4, severe pain, severe bleeding, headache that does not improve with rest, medication, blurred vision, or postpartum depression. Monitor incision for signs of infection including, foul odor, discharge or separation of the wound.  Call for blood clots larger than a golf ball or saturating a pad in less than an hour.         Discharge Follow-up with Specified Provider: FU in 2 weeks with an APRN and 6 weeks with Dr. Gonzáles; 2 Weeks   As directed      To: FU in 2 weeks with an CARLTON and 6 weeks with Dr. Gonzáles   Follow Up: 2 Weeks                CARLTON Flores  03/08/25  11:06 EST

## 2025-03-09 ENCOUNTER — MATERNAL SCREENING (OUTPATIENT)
Dept: CALL CENTER | Facility: HOSPITAL | Age: 40
End: 2025-03-09
Payer: COMMERCIAL

## 2025-03-09 NOTE — OUTREACH NOTE
Maternal Screening Survey      Flowsheet Row Responses   Eligibility Eligible   Prep survey completed? Yes   Facility patient discharged from? [facility] - Registered Nurse

## 2025-03-11 LAB
CYTO UR: NORMAL
LAB AP CASE REPORT: NORMAL
LAB AP CLINICAL INFORMATION: NORMAL
PATH REPORT.FINAL DX SPEC: NORMAL
PATH REPORT.GROSS SPEC: NORMAL

## 2025-03-20 ENCOUNTER — MATERNAL SCREENING (OUTPATIENT)
Dept: CALL CENTER | Facility: HOSPITAL | Age: 40
End: 2025-03-20
Payer: COMMERCIAL

## 2025-03-20 ENCOUNTER — POSTPARTUM VISIT (OUTPATIENT)
Dept: OBSTETRICS AND GYNECOLOGY | Facility: CLINIC | Age: 40
End: 2025-03-20
Payer: COMMERCIAL

## 2025-03-20 VITALS
WEIGHT: 196.6 LBS | BODY MASS INDEX: 34.84 KG/M2 | HEIGHT: 63 IN | SYSTOLIC BLOOD PRESSURE: 122 MMHG | DIASTOLIC BLOOD PRESSURE: 80 MMHG

## 2025-03-20 PROCEDURE — 0503F POSTPARTUM CARE VISIT: CPT | Performed by: NURSE PRACTITIONER

## 2025-03-20 NOTE — OUTREACH NOTE
Maternal Screening Survey      Flowsheet Row Responses   Facility patient discharged from? [facility]   Attempt successful? No   Unsuccessful attempts Attempt 2              [facility] - Registered Nurse

## 2025-03-20 NOTE — PROGRESS NOTES
"      Chief Complaint   Patient presents with    Postpartum Care       Postpartum Visit         Brenda Trejo is a 39 y.o.  who presents today for a 2 week(s) postpartum check.      C/S:  Primary       , Low Transverse   Information for the patient's :  Osvaldo Trejo [[id number]]   3/6[birth year]2025   male   Osvaldo Trejo   2390 g (5 lb 4.3 oz)   Gestational Age: 37w0d     Baby Discharged with Mom  Delivering MD: Tiana Gonzáles MD.    Pregnancy complications:  unfavorable cervix and pelvis    Patient reports her incision is clean, dry, intact. Patient reports tenderness and RLQ pain stated 3 days ago. Patient describes vaginal bleeding as light. She is pumping and supplementation. She would like to discuss the following complaints today: RLQ pain for 3 Days.    She desires to discuss  nothing  for contraception.    Patient denies concerns for postpartum depression/anxiety. Patient denies  suicidal or homicidal ideation. Her postpartum depression screening questionnaire: 0. No treatment is indicated      The additional following portions of the patient's history were reviewed and updated as appropriate: allergies and current medications.      Review of Systems    I have reviewed and agree with the HPI, ROS, and historical information as entered above. Jaimee Walls, APRN      Objective   /80 (BP Location: Right arm, Patient Position: Sitting, Cuff Size: Adult)   Ht 160 cm (62.99\")   Wt 89.2 kg (196 lb 9.6 oz)   LMP 2024 (Exact Date)   Breastfeeding No Comment: pumping  BMI 34.83 kg/m²     Physical Exam  Vitals and nursing note reviewed.   Constitutional:       General: She is not in acute distress.     Appearance: Normal appearance. She is not ill-appearing.   Pulmonary:      Effort: Pulmonary effort is normal. No respiratory distress.   Abdominal:      General: There is no distension.      Palpations: Abdomen is soft. There is no mass.      Tenderness: There is no " guarding or rebound.      Hernia: No hernia is present.      Comments: Incision healing well; edges well-approximated without redness or drainage   Skin:     General: Skin is warm and dry.   Neurological:      Mental Status: She is alert and oriented to person, place, and time.   Psychiatric:         Mood and Affect: Mood normal.         Behavior: Behavior normal.              Assessment and Plan    Problem List Items Addressed This Visit          Gravid and     Delivery of pregnancy by  section    Overview   3/6/2025-primary  section.  37 weeks.  IUGR.  Baby boy named Osvaldo weighing 5 pounds 1 ounce          Other Visit Diagnoses         Postpartum follow-up    -  Primary            S/p , 2 week(s) postpartum.  Doing well.    Continued pelvic rest with a return to driving and light physical activity.  Baby doing well.  No si/sx of postpartum depression  Return in about 4 weeks (around 2025).    Jaimee Walls, APRN  2025

## 2025-03-20 NOTE — OUTREACH NOTE
Maternal Screening Survey      Flowsheet Row Responses   Facility patient discharged from? [facility]   Attempt successful? No   Unsuccessful attempts Attempt 1              [facility] - Registered Nurse

## 2025-03-21 ENCOUNTER — MATERNAL SCREENING (OUTPATIENT)
Dept: CALL CENTER | Facility: HOSPITAL | Age: 40
End: 2025-03-21
Payer: COMMERCIAL

## 2025-03-21 NOTE — OUTREACH NOTE
Maternal Screening Survey      Flowsheet Row Responses   Facility patient discharged from? [place]   Attempt successful? Yes   Call start time 915   Call end time 917   I have been able to laugh and see the funny side of things. 0   I have looked forward with enjoyment to things. 0   I have blamed myself unnecessarily when things went wrong. 0   I have been anxious or worried for no good reason. 0   I have felt scared or panicky for no good reason. 0   Things have been getting on top of me. 0   I have been so unhappy that I have had difficulty sleeping. 0   I have felt sad or miserable. 0   I have been so unhappy that I have been crying. 0   The thought of harming myself has occurred to me. 0   [place]  Depression Scale Total 0   Did any of your parents have problems with alcohol or drug use? No   Do any of your peers have problems with alcohol or drug use? No   Does your partner have problems with alcohol or drug use? No   Before you were pregnant did you have problems with alcohol or drug use? (past) No   In the past month, did you drink beer, wine, liquor or use any other drugs? (pregnancy) No   Maternal Screening call completed Yes   Call end time 917              Lori SUNG - Registered Nurse

## 2025-04-05 ENCOUNTER — APPOINTMENT (OUTPATIENT)
Dept: GENERAL RADIOLOGY | Facility: HOSPITAL | Age: 40
End: 2025-04-05
Payer: COMMERCIAL

## 2025-04-05 ENCOUNTER — APPOINTMENT (OUTPATIENT)
Dept: CT IMAGING | Facility: HOSPITAL | Age: 40
End: 2025-04-05
Payer: COMMERCIAL

## 2025-04-05 ENCOUNTER — HOSPITAL ENCOUNTER (OUTPATIENT)
Facility: HOSPITAL | Age: 40
Discharge: HOME | End: 2025-04-06
Attending: EMERGENCY MEDICINE | Admitting: SURGERY
Payer: COMMERCIAL

## 2025-04-05 DIAGNOSIS — R50.9 FEVER AND CHILLS: ICD-10-CM

## 2025-04-05 DIAGNOSIS — K35.80 ACUTE APPENDICITIS: ICD-10-CM

## 2025-04-05 DIAGNOSIS — Z86.19 HISTORY OF CLOSTRIDIOIDES DIFFICILE INFECTION: ICD-10-CM

## 2025-04-05 DIAGNOSIS — Z98.84 HISTORY OF GASTRIC RESTRICTIVE SURGERY: ICD-10-CM

## 2025-04-05 DIAGNOSIS — R11.2 NAUSEA AND VOMITING, UNSPECIFIED VOMITING TYPE: ICD-10-CM

## 2025-04-05 DIAGNOSIS — K35.30 ACUTE APPENDICITIS WITH LOCALIZED PERITONITIS, WITHOUT PERFORATION, ABSCESS, OR GANGRENE: Primary | ICD-10-CM

## 2025-04-05 DIAGNOSIS — N61.0 MASTITIS: ICD-10-CM

## 2025-04-05 LAB
ALBUMIN SERPL-MCNC: 4.1 G/DL (ref 3.5–5.2)
ALBUMIN/GLOB SERPL: 1.6 G/DL
ALP SERPL-CCNC: 73 U/L (ref 39–117)
ALT SERPL W P-5'-P-CCNC: 14 U/L (ref 1–33)
ANION GAP SERPL CALCULATED.3IONS-SCNC: 14 MMOL/L (ref 5–15)
AST SERPL-CCNC: 19 U/L (ref 1–32)
B PARAPERT DNA SPEC QL NAA+PROBE: NOT DETECTED
B PERT DNA SPEC QL NAA+PROBE: NOT DETECTED
BACTERIA UR QL AUTO: ABNORMAL /HPF
BASOPHILS # BLD AUTO: 0.02 10*3/MM3 (ref 0–0.2)
BASOPHILS NFR BLD AUTO: 0.2 % (ref 0–1.5)
BILIRUB SERPL-MCNC: 0.4 MG/DL (ref 0–1.2)
BILIRUB UR QL STRIP: NEGATIVE
BUN SERPL-MCNC: 9 MG/DL (ref 6–20)
BUN/CREAT SERPL: 13 (ref 7–25)
C PNEUM DNA NPH QL NAA+NON-PROBE: NOT DETECTED
CALCIUM SPEC-SCNC: 8.8 MG/DL (ref 8.6–10.5)
CHLORIDE SERPL-SCNC: 105 MMOL/L (ref 98–107)
CLARITY UR: CLEAR
CO2 SERPL-SCNC: 23 MMOL/L (ref 22–29)
COLOR UR: YELLOW
CREAT SERPL-MCNC: 0.69 MG/DL (ref 0.57–1)
D-LACTATE SERPL-SCNC: 1 MMOL/L (ref 0.5–2)
DEPRECATED RDW RBC AUTO: 39.9 FL (ref 37–54)
EGFRCR SERPLBLD CKD-EPI 2021: 113.4 ML/MIN/1.73
EOSINOPHIL # BLD AUTO: 0.06 10*3/MM3 (ref 0–0.4)
EOSINOPHIL NFR BLD AUTO: 0.6 % (ref 0.3–6.2)
ERYTHROCYTE [DISTWIDTH] IN BLOOD BY AUTOMATED COUNT: 12.3 % (ref 12.3–15.4)
FLUAV SUBTYP SPEC NAA+PROBE: NOT DETECTED
FLUBV RNA ISLT QL NAA+PROBE: NOT DETECTED
GLOBULIN UR ELPH-MCNC: 2.6 GM/DL
GLUCOSE SERPL-MCNC: 82 MG/DL (ref 65–99)
GLUCOSE UR STRIP-MCNC: NEGATIVE MG/DL
HADV DNA SPEC NAA+PROBE: NOT DETECTED
HCOV 229E RNA SPEC QL NAA+PROBE: NOT DETECTED
HCOV HKU1 RNA SPEC QL NAA+PROBE: NOT DETECTED
HCOV NL63 RNA SPEC QL NAA+PROBE: NOT DETECTED
HCOV OC43 RNA SPEC QL NAA+PROBE: NOT DETECTED
HCT VFR BLD AUTO: 34.1 % (ref 34–46.6)
HGB BLD-MCNC: 11.3 G/DL (ref 12–15.9)
HGB UR QL STRIP.AUTO: ABNORMAL
HMPV RNA NPH QL NAA+NON-PROBE: NOT DETECTED
HOLD SPECIMEN: NORMAL
HPIV1 RNA ISLT QL NAA+PROBE: NOT DETECTED
HPIV2 RNA SPEC QL NAA+PROBE: NOT DETECTED
HPIV3 RNA NPH QL NAA+PROBE: NOT DETECTED
HPIV4 P GENE NPH QL NAA+PROBE: NOT DETECTED
HYALINE CASTS UR QL AUTO: ABNORMAL /LPF
IMM GRANULOCYTES # BLD AUTO: 0.03 10*3/MM3 (ref 0–0.05)
IMM GRANULOCYTES NFR BLD AUTO: 0.3 % (ref 0–0.5)
KETONES UR QL STRIP: ABNORMAL
LEUKOCYTE ESTERASE UR QL STRIP.AUTO: NEGATIVE
LYMPHOCYTES # BLD AUTO: 0.79 10*3/MM3 (ref 0.7–3.1)
LYMPHOCYTES NFR BLD AUTO: 8.4 % (ref 19.6–45.3)
M PNEUMO IGG SER IA-ACNC: NOT DETECTED
MAGNESIUM SERPL-MCNC: 1.5 MG/DL (ref 1.6–2.6)
MCH RBC QN AUTO: 29.9 PG (ref 26.6–33)
MCHC RBC AUTO-ENTMCNC: 33.1 G/DL (ref 31.5–35.7)
MCV RBC AUTO: 90.2 FL (ref 79–97)
MONOCYTES # BLD AUTO: 0.53 10*3/MM3 (ref 0.1–0.9)
MONOCYTES NFR BLD AUTO: 5.7 % (ref 5–12)
NEUTROPHILS NFR BLD AUTO: 7.95 10*3/MM3 (ref 1.7–7)
NEUTROPHILS NFR BLD AUTO: 84.8 % (ref 42.7–76)
NITRITE UR QL STRIP: NEGATIVE
NRBC BLD AUTO-RTO: 0 /100 WBC (ref 0–0.2)
PH UR STRIP.AUTO: 6.5 [PH] (ref 5–8)
PLATELET # BLD AUTO: 188 10*3/MM3 (ref 140–450)
PMV BLD AUTO: 8.9 FL (ref 6–12)
POTASSIUM SERPL-SCNC: 3.5 MMOL/L (ref 3.5–5.2)
PROCALCITONIN SERPL-MCNC: 0.05 NG/ML (ref 0–0.25)
PROT SERPL-MCNC: 6.7 G/DL (ref 6–8.5)
PROT UR QL STRIP: NEGATIVE
RBC # BLD AUTO: 3.78 10*6/MM3 (ref 3.77–5.28)
RBC # UR STRIP: ABNORMAL /HPF
REF LAB TEST METHOD: ABNORMAL
RHINOVIRUS RNA SPEC NAA+PROBE: NOT DETECTED
RSV RNA NPH QL NAA+NON-PROBE: NOT DETECTED
SARS-COV-2 RNA NPH QL NAA+NON-PROBE: NOT DETECTED
SODIUM SERPL-SCNC: 142 MMOL/L (ref 136–145)
SP GR UR STRIP: 1.04 (ref 1–1.03)
SQUAMOUS #/AREA URNS HPF: ABNORMAL /HPF
UROBILINOGEN UR QL STRIP: ABNORMAL
WBC # UR STRIP: ABNORMAL /HPF
WBC NRBC COR # BLD AUTO: 9.38 10*3/MM3 (ref 3.4–10.8)
WHOLE BLOOD HOLD COAG: NORMAL
WHOLE BLOOD HOLD SPECIMEN: NORMAL

## 2025-04-05 PROCEDURE — 81001 URINALYSIS AUTO W/SCOPE: CPT | Performed by: PHYSICIAN ASSISTANT

## 2025-04-05 PROCEDURE — 96374 THER/PROPH/DIAG INJ IV PUSH: CPT

## 2025-04-05 PROCEDURE — 87040 BLOOD CULTURE FOR BACTERIA: CPT | Performed by: PHYSICIAN ASSISTANT

## 2025-04-05 PROCEDURE — G0378 HOSPITAL OBSERVATION PER HR: HCPCS

## 2025-04-05 PROCEDURE — 25510000001 IOPAMIDOL 61 % SOLUTION: Performed by: EMERGENCY MEDICINE

## 2025-04-05 PROCEDURE — 80053 COMPREHEN METABOLIC PANEL: CPT | Performed by: PHYSICIAN ASSISTANT

## 2025-04-05 PROCEDURE — 83605 ASSAY OF LACTIC ACID: CPT | Performed by: PHYSICIAN ASSISTANT

## 2025-04-05 PROCEDURE — 25810000003 SODIUM CHLORIDE 0.9 % SOLUTION: Performed by: PHYSICIAN ASSISTANT

## 2025-04-05 PROCEDURE — 71045 X-RAY EXAM CHEST 1 VIEW: CPT

## 2025-04-05 PROCEDURE — 74177 CT ABD & PELVIS W/CONTRAST: CPT

## 2025-04-05 PROCEDURE — 84145 PROCALCITONIN (PCT): CPT | Performed by: PHYSICIAN ASSISTANT

## 2025-04-05 PROCEDURE — 85025 COMPLETE CBC W/AUTO DIFF WBC: CPT | Performed by: PHYSICIAN ASSISTANT

## 2025-04-05 PROCEDURE — 83735 ASSAY OF MAGNESIUM: CPT | Performed by: PHYSICIAN ASSISTANT

## 2025-04-05 PROCEDURE — 25810000003 SODIUM CHLORIDE 0.9 % SOLUTION: Performed by: SURGERY

## 2025-04-05 PROCEDURE — 25010000002 CEFOXITIN PER 1 G: Performed by: PHYSICIAN ASSISTANT

## 2025-04-05 PROCEDURE — 87086 URINE CULTURE/COLONY COUNT: CPT | Performed by: PHYSICIAN ASSISTANT

## 2025-04-05 PROCEDURE — 0202U NFCT DS 22 TRGT SARS-COV-2: CPT | Performed by: PHYSICIAN ASSISTANT

## 2025-04-05 PROCEDURE — 99285 EMERGENCY DEPT VISIT HI MDM: CPT

## 2025-04-05 PROCEDURE — 93005 ELECTROCARDIOGRAM TRACING: CPT | Performed by: PHYSICIAN ASSISTANT

## 2025-04-05 PROCEDURE — 25010000002 METOCLOPRAMIDE PER 10 MG: Performed by: PHYSICIAN ASSISTANT

## 2025-04-05 RX ORDER — IOPAMIDOL 612 MG/ML
100 INJECTION, SOLUTION INTRAVASCULAR
Status: COMPLETED | OUTPATIENT
Start: 2025-04-05 | End: 2025-04-05

## 2025-04-05 RX ORDER — ONDANSETRON 4 MG/1
4 TABLET, ORALLY DISINTEGRATING ORAL EVERY 6 HOURS PRN
Status: DISCONTINUED | OUTPATIENT
Start: 2025-04-05 | End: 2025-04-06 | Stop reason: HOSPADM

## 2025-04-05 RX ORDER — ONDANSETRON 2 MG/ML
4 INJECTION INTRAMUSCULAR; INTRAVENOUS EVERY 6 HOURS PRN
Status: DISCONTINUED | OUTPATIENT
Start: 2025-04-05 | End: 2025-04-06 | Stop reason: HOSPADM

## 2025-04-05 RX ORDER — HYDROMORPHONE HYDROCHLORIDE 1 MG/ML
0.5 INJECTION, SOLUTION INTRAMUSCULAR; INTRAVENOUS; SUBCUTANEOUS
Status: DISCONTINUED | OUTPATIENT
Start: 2025-04-05 | End: 2025-04-06 | Stop reason: HOSPADM

## 2025-04-05 RX ORDER — ACETAMINOPHEN 650 MG/1
650 SUPPOSITORY RECTAL EVERY 4 HOURS PRN
Status: DISCONTINUED | OUTPATIENT
Start: 2025-04-05 | End: 2025-04-06 | Stop reason: HOSPADM

## 2025-04-05 RX ORDER — SODIUM CHLORIDE 0.9 % (FLUSH) 0.9 %
10 SYRINGE (ML) INJECTION AS NEEDED
Status: DISCONTINUED | OUTPATIENT
Start: 2025-04-05 | End: 2025-04-06 | Stop reason: HOSPADM

## 2025-04-05 RX ORDER — ACETAMINOPHEN 325 MG/1
650 TABLET ORAL ONCE
Status: COMPLETED | OUTPATIENT
Start: 2025-04-05 | End: 2025-04-05

## 2025-04-05 RX ORDER — NALOXONE HCL 0.4 MG/ML
0.1 VIAL (ML) INJECTION
Status: DISCONTINUED | OUTPATIENT
Start: 2025-04-05 | End: 2025-04-06 | Stop reason: HOSPADM

## 2025-04-05 RX ORDER — METOCLOPRAMIDE HYDROCHLORIDE 5 MG/ML
10 INJECTION INTRAMUSCULAR; INTRAVENOUS ONCE
Status: COMPLETED | OUTPATIENT
Start: 2025-04-05 | End: 2025-04-05

## 2025-04-05 RX ORDER — FAMOTIDINE 20 MG/1
20 TABLET, FILM COATED ORAL 2 TIMES DAILY
Status: DISCONTINUED | OUTPATIENT
Start: 2025-04-05 | End: 2025-04-06 | Stop reason: HOSPADM

## 2025-04-05 RX ORDER — DOCUSATE SODIUM 100 MG/1
100 CAPSULE, LIQUID FILLED ORAL 2 TIMES DAILY PRN
Status: DISCONTINUED | OUTPATIENT
Start: 2025-04-05 | End: 2025-04-06 | Stop reason: HOSPADM

## 2025-04-05 RX ORDER — NALOXONE HCL 0.4 MG/ML
0.4 VIAL (ML) INJECTION
Status: DISCONTINUED | OUTPATIENT
Start: 2025-04-05 | End: 2025-04-06 | Stop reason: HOSPADM

## 2025-04-05 RX ORDER — SODIUM CHLORIDE 9 MG/ML
100 INJECTION, SOLUTION INTRAVENOUS CONTINUOUS
Status: DISCONTINUED | OUTPATIENT
Start: 2025-04-06 | End: 2025-04-06

## 2025-04-05 RX ORDER — MORPHINE SULFATE 4 MG/ML
4 INJECTION, SOLUTION INTRAMUSCULAR; INTRAVENOUS
Status: DISCONTINUED | OUTPATIENT
Start: 2025-04-05 | End: 2025-04-06

## 2025-04-05 RX ORDER — ACETAMINOPHEN 325 MG/1
650 TABLET ORAL EVERY 4 HOURS PRN
Status: DISCONTINUED | OUTPATIENT
Start: 2025-04-05 | End: 2025-04-06 | Stop reason: HOSPADM

## 2025-04-05 RX ADMIN — ACETAMINOPHEN 650 MG: 325 TABLET, FILM COATED ORAL at 19:20

## 2025-04-05 RX ADMIN — FAMOTIDINE 20 MG: 20 TABLET, FILM COATED ORAL at 22:31

## 2025-04-05 RX ADMIN — SODIUM CHLORIDE 1000 ML: 9 INJECTION, SOLUTION INTRAVENOUS at 19:20

## 2025-04-05 RX ADMIN — IOPAMIDOL 75 ML: 612 INJECTION, SOLUTION INTRAVENOUS at 20:35

## 2025-04-05 RX ADMIN — SODIUM CHLORIDE 100 ML/HR: 900 INJECTION, SOLUTION INTRAVENOUS at 23:27

## 2025-04-05 RX ADMIN — METOCLOPRAMIDE 10 MG: 5 INJECTION, SOLUTION INTRAMUSCULAR; INTRAVENOUS at 19:20

## 2025-04-05 RX ADMIN — CEFOXITIN 2000 MG: 2 INJECTION, POWDER, FOR SOLUTION INTRAVENOUS at 22:32

## 2025-04-05 NOTE — ED PROVIDER NOTES
Subjective   History of Present Illness  This is a 39-year-old female that presents to the ER with multiple complaints.  Patient is 4 weeks postpartum.  She had  on 3/6/2025 at 37 weeks gestation.  She followed with Dr. Tiana Gonzáles.  She is  1 para 1 miscarriage 0.  Patient says she has been exclusively pumping and breast-feeding per bottle.  She noticed that she has been having increased bilateral breast pain, left greater than right x 2 days.  Both breasts appear warm and there is area of confluent erythema to the left medial breast.  Patient denies any induration.  She has continued to pump regularly throughout the day today.  She has been getting around 3 ounces from each breast every 3-4 hours.  Only at 1 session of pumping did she get out less milk from the left breast, approximately 1-1/2 ounces.  Patient has applied warm compresses and gotten in the warm shower, which seemed to help.  Patient then developed subjective fever with chills and sweats.  Patient denies dysuria, urgency, or frequency.  Patient then developed generalized abdominal pain and cramping with radiation to the low back.  She also has vomited twice today..  Patient denies any diarrhea.  Patient denies any known sick contacts.  Past medical history is significant for asthma, C. difficile diarrhea, chronic back pain, allergic rhinitis, dyspepsia, eczema, anxiety, migraine headaches, gout, and depression.  Previous abdominal surgeries include recent  on 3/6/2025 as well as gastric restrictive surgery of gastric sleeve.  Patient says she is having minimal vaginal spotting after recent delivery, and the bleeding has almost completely resolved.  Patient says her  incision is also healing well.  She had a small amount of blood that was at the corner of the incision, but no significant wound dehiscence.    History provided by:  Patient and medical records  Breast Pain  Location:  Bilateral breast pain, left  greater than right, warmth to breasts, fever and chills, n/v, generalized abdominal pain.  Severity:  Moderate  Onset quality:  Gradual  Duration:  2 days  Timing:  Constant  Progression:  Worsening  Chronicity:  New  Context:  Patient is 4 weeks postpartum.  She has been having bilateral breast pain, left greater than right x 2 days, fever, chills, nausea/vomiting x 2, and generalized abdominal pain.  Relieved by:  Nothing  Worsened by:  Palpation of the bilateral breasts, as well as pumping.  Ineffective treatments:  Warm compresses to bilateral breasts, pumping breast milk.  Associated symptoms: abdominal pain (Generalized abdominal pain, right greater than left with radiation to the back), fatigue, fever, nausea and vomiting (Emesis x 2 today)    Associated symptoms: no chest pain, no congestion, no cough, no diarrhea, no ear pain, no headaches, no myalgias, no rash, no rhinorrhea, no shortness of breath and no sore throat    Risk factors:  4 weeks post-partum. Delivery on 3/6/25 at 37 weeks gestation.  Miscarriage 0. Pt follows with Dr. Gonzáles.      Review of Systems   Constitutional:  Positive for activity change, appetite change, chills, diaphoresis, fatigue and fever.   HENT: Negative.  Negative for congestion, ear pain, postnasal drip, rhinorrhea, sinus pressure, sinus pain, sneezing and sore throat.    Respiratory: Negative.  Negative for cough and shortness of breath.    Cardiovascular: Negative.  Negative for chest pain, palpitations and leg swelling.   Gastrointestinal:  Positive for abdominal pain (Generalized abdominal pain, right greater than left with radiation to the back), nausea and vomiting (Emesis x 2 today). Negative for constipation and diarrhea.   Genitourinary:  Positive for vaginal bleeding (Light vaginal spotting that has almost completely resolved after recent ). Negative for dysuria, flank pain, frequency and urgency.        1 para 1 miscarriage 0.  Patient had   at 37 weeks gestation on 3/6/2025.  She follows routinely with OB/GYN, Dr. Tiana Gonzáles.   Musculoskeletal:  Positive for back pain (Right lower back pain.  No CVA pain.). Negative for myalgias.   Skin:  Positive for color change (Patient has a small area of confluent erythema to the left medial breast.  Warmth noted.  No induration.) and wound ( incision is healing well). Negative for rash.   Neurological:  Positive for weakness. Negative for dizziness, syncope and headaches.   All other systems reviewed and are negative.      Past Medical History:   Diagnosis Date    Abnormal Pap smear of cervix     Allergic rhinitis     Anxiety     Asthma     controlled    C. difficile diarrhea     10/2021    Chronic back pain 2010    r/t trauma, failed back surgery x 2, denies NSAIDS/steroids/narcotics    Claustrophobia     Depression     Dyspepsia     Dyspnea on exertion     Eczema     on Dupixent    Fatigue     Gout     Migraine     Morbid obesity     Urinary tract infection        Allergies   Allergen Reactions    Penicillins Rash     Keflex OK    Sulfa Antibiotics Hives       Past Surgical History:   Procedure Laterality Date    BREAST BIOPSY  10/2019    CARPAL TUNNEL RELEASE Bilateral      SECTION N/A 3/6/2025    Procedure:  SECTION PRIMARY;  Surgeon: Tiana Gonzáles MD;  Location: [facility] LABOR DELIVERY;  Service: Obstetrics/Gynecology;  Laterality: N/A;    COLONOSCOPY      GASTRIC SLEEVE LAPAROSCOPIC N/A 2023    Procedure: GASTRIC SLEEVE LAPAROSCOPIC WITH [employer] WITH ESOPHAGOGASTRODUODENOSCOPY;  Surgeon: Ashley Leong MD;  Location: [facility] OR;  Service: Robotics - DaVinci;  Laterality: N/A;    LUMBAR DISCECTOMY Left 2021    Procedure: LUMBAR MICRODISCECTOMY L5-S1;  Surgeon: James Estrada MD;  Location:  [facility] OR;  Service: Neurosurgery;  Laterality: Left;    MOUTH SURGERY      Gum Grafts    TONSILLECTOMY AND ADENOIDECTOMY       TRIGGER FINGER RELEASE Right 2021    WISDOM TOOTH EXTRACTION  2007       Family History   Problem Relation Age of Onset    Melanoma Father     Drug abuse Father     Breast cancer Mother     Colon cancer Mother     Stroke Maternal Grandfather     Diabetes Maternal Grandfather     Arthritis Maternal Grandfather     Breast cancer Maternal Aunt     Diabetes Maternal Aunt     Ovarian cancer Neg Hx     Uterine cancer Neg Hx     Prostate cancer Neg Hx     Deep vein thrombosis Neg Hx     Pulmonary embolism Neg Hx     Coronary artery disease Neg Hx     Osteoporosis Neg Hx     Hypertension Neg Hx        Social History     Socioeconomic History    Marital status:      Spouse name: Carroll    Number of children: 1    Highest education level: Some college, no degree   Tobacco Use    Smoking status: Never     Passive exposure: Never    Smokeless tobacco: Never   Vaping Use    Vaping status: Never Used   Substance and Sexual Activity    Alcohol use: Not Currently     Alcohol/week: 1.0 standard drink of alcohol     Types: 1 Glasses of wine per week     Comment: rarely, every 3 months    Drug use: Never    Sexual activity: Yes     Partners: Male     Birth control/protection: None           Objective   Physical Exam  Vitals and nursing note reviewed.   Constitutional:       General: She is not in acute distress.     Appearance: Normal appearance. She is ill-appearing. She is not toxic-appearing or diaphoretic.      Comments: Mildly ill.  Nontoxic.  No acute distress.   HENT:      Head: Normocephalic and atraumatic.      Right Ear: Tympanic membrane normal. Tympanic membrane is not erythematous, retracted or bulging.      Left Ear: Tympanic membrane is not erythematous, retracted or bulging.      Ears:      Comments: Bilateral TMs are clear     Nose: Nose normal. No congestion or rhinorrhea.      Comments: No nasal congestion or rhinorrhea     Mouth/Throat:      Mouth: Mucous membranes are moist.      Pharynx: Oropharynx is  clear. No pharyngeal swelling, oropharyngeal exudate, posterior oropharyngeal erythema, uvula swelling or postnasal drip.      Comments: Oral mucous membranes are still moist.  Posterior pharynx is not erythematous.  No exudate or vesicles.  Eyes:      Extraocular Movements: Extraocular movements intact.      Conjunctiva/sclera: Conjunctivae normal.      Pupils: Pupils are equal, round, and reactive to light.   Neck:      Comments: No C-spine tenderness.  Full range of motion.  No meningeal signs.  No cervical lymphadenopathy.  Cardiovascular:      Rate and Rhythm: Normal rate and regular rhythm. No extrasystoles are present.     Pulses: Normal pulses.           Dorsalis pedis pulses are 2+ on the right side and 2+ on the left side.        Posterior tibial pulses are 2+ on the right side and 2+ on the left side.      Heart sounds: Normal heart sounds.      Comments: Regular rate and rhythm.  No tachycardia or ectopy.  No pedal edema to lower extremities.  Pulmonary:      Effort: Pulmonary effort is normal. No tachypnea, accessory muscle usage or retractions.      Breath sounds: Normal breath sounds. No decreased breath sounds, wheezing or rhonchi.      Comments: Regular respiratory effort.  Lungs are clear to auscultation bilaterally.  No wheezes, rhonchi, or decreased breath sounds concerning for consolidation.  Chest:      Chest wall: Tenderness present.   Breasts:     Right: Tenderness present. No swelling.      Left: Tenderness present. No swelling.      Comments: Patient has tenderness and warmth to bilateral breasts.  They do not appear engorged.  Patient has a small area of localized confluent faint, pink erythema and warmth to medial left breast.  There is no induration.  Breasts are soft.  Suspect mild mastitis, left greater than right.  No erythematous streaking.  Abdominal:      General: Bowel sounds are normal. There is no distension.      Palpations: Abdomen is soft.      Tenderness: There is abdominal  tenderness in the right lower quadrant. There is guarding. There is no right CVA tenderness, left CVA tenderness or rebound. Positive signs include McBurney's sign. Negative signs include Funk's sign, Rovsing's sign, psoas sign and obturator sign.      Hernia: No hernia is present. There is no hernia in the umbilical area or ventral area.      Comments: Abdomen soft without distention or rigidity.  Active bowel sounds in all 4 quadrants.  Moderate tenderness to right lower quadrant with involuntary guarding.  Positive McBurney sign.  Negative Rovsing sign.  No umbilical or ventral herniation.   incision to the mid lower abdomen is healing well.  There is no wound dehiscence, confluent erythema, or warmth.  No flank or CVA tenderness.  Abdominal exam is nonsurgical.   Musculoskeletal:         General: Normal range of motion.      Cervical back: Normal range of motion and neck supple. No pain with movement, spinous process tenderness or muscular tenderness.      Right lower leg: No edema.      Left lower leg: No edema.   Lymphadenopathy:      Cervical: No cervical adenopathy.      Right cervical: No superficial, deep or posterior cervical adenopathy.     Left cervical: No superficial, deep or posterior cervical adenopathy.      Upper Body:      Right upper body: No axillary adenopathy.      Left upper body: No axillary adenopathy.   Skin:     General: Skin is warm and dry.      Findings: Erythema present. No wound.      Comments: See breast exam for details.  Small localized area of faint, pink erythema to left medial breast.  No open wounds or induration.  No erythematous streaking   Neurological:      General: No focal deficit present.      Mental Status: She is alert and oriented to person, place, and time.      Cranial Nerves: Cranial nerves 2-12 are intact.      Sensory: Sensation is intact.      Motor: Motor function is intact.      Coordination: Coordination is intact.      Gait: Gait is intact.       Comments: Alert and oriented x 3.  Neuro intact and nonfocal   Psychiatric:         Mood and Affect: Mood and affect normal.         Speech: Speech normal.         Behavior: Behavior is cooperative.         Thought Content: Thought content normal.         Cognition and Memory: Cognition and memory normal.         Judgment: Judgment normal.      Comments: Normal mood and affect         Procedures           ED Course  ED Course as of 25 0353   Sat 2025   1906 Pt pumping with breast feeding exclusively. She has been pumping every 3-4 hours. She has been getting out 3oz from each breast. One of her pumps today from left breast was decreased at 1 1/2 ounces.   [FC]    History of gastric sleeve in  and recent . No other abdominal surgeries. [FC]    Initial temp was 100.2.  I gave oral dose of Tylenol and initiated IV fluids and Reglan for nausea since patient is breast-feeding.  CBC shows normal white blood cell count at 9000 with 84% neutrophils on the differential.  Chemistries were within normal limits.  Magnesium level is 1.5.  Lactic acid is 1.0.  Respiratory PCR panel was completely negative.  Urinalysis reveals no bacteria, 21-50 red blood cells, negative leukocytes, negative nitrite, and 2+ ketones.  CT imaging of the abdomen/pelvis with contrast revealed acute appendicitis without evidence of perforation or abscess.  Postsurgical changes in the ventral abdominal wall and lower pelvis consistent with recent .  No concern for postoperative abscess.  Paged Dr. Camp regarding acute appendicitis found on CT imaging. Pt has PCN allergy. [FC]    Discussed the case with Dr. Camp. Mefoxim. He will admit the patient, NPO after midnight, and he will perform appendectomy in the morning.  I updated patient, , and friend at the bedside on all results and need for admission.  Repeat abdominal exam is benign and nonsurgical.  We will initiate Mefoxin 2 grams during the  ER course.  Patient ready for admission per Dr. Camp. [FC]      ED Course User Index  [FC] Kriss Rosales PA-C                                                  Recent Results (from the past 24 hours)   Green Top (Gel)    Collection Time: 04/05/25  7:03 PM   Result Value Ref Range    Extra Tube Hold for add-ons.    Lavender Top    Collection Time: 04/05/25  7:03 PM   Result Value Ref Range    Extra Tube hold for add-on    Gold Top - SST    Collection Time: 04/05/25  7:03 PM   Result Value Ref Range    Extra Tube Hold for add-ons.    Gray Top    Collection Time: 04/05/25  7:03 PM   Result Value Ref Range    Extra Tube Hold for add-ons.    Light Blue Top    Collection Time: 04/05/25  7:03 PM   Result Value Ref Range    Extra Tube Hold for add-ons.    Comprehensive Metabolic Panel    Collection Time: 04/05/25  7:03 PM    Specimen: Blood   Result Value Ref Range    Glucose 82 65 - 99 mg/dL    BUN 9 6 - 20 mg/dL    Creatinine 0.69 0.57 - 1.00 mg/dL    Sodium 142 136 - 145 mmol/L    Potassium 3.5 3.5 - 5.2 mmol/L    Chloride 105 98 - 107 mmol/L    CO2 23.0 22.0 - 29.0 mmol/L    Calcium 8.8 8.6 - 10.5 mg/dL    Total Protein 6.7 6.0 - 8.5 g/dL    Albumin 4.1 3.5 - 5.2 g/dL    ALT (SGPT) 14 1 - 33 U/L    AST (SGOT) 19 1 - 32 U/L    Alkaline Phosphatase 73 39 - 117 U/L    Total Bilirubin 0.4 0.0 - 1.2 mg/dL    Globulin 2.6 gm/dL    A/G Ratio 1.6 g/dL    BUN/Creatinine Ratio 13.0 7.0 - 25.0    Anion Gap 14.0 5.0 - 15.0 mmol/L    eGFR 113.4 >60.0 mL/min/1.73   Lactic Acid, Plasma    Collection Time: 04/05/25  7:03 PM    Specimen: Blood   Result Value Ref Range    Lactate 1.0 0.5 - 2.0 mmol/L   Procalcitonin    Collection Time: 04/05/25  7:03 PM    Specimen: Blood   Result Value Ref Range    Procalcitonin 0.05 0.00 - 0.25 ng/mL   Magnesium    Collection Time: 04/05/25  7:03 PM    Specimen: Blood   Result Value Ref Range    Magnesium 1.5 (L) 1.6 - 2.6 mg/dL   CBC Auto Differential    Collection Time: 04/05/25  7:03 PM     Specimen: Blood   Result Value Ref Range    WBC 9.38 3.40 - 10.80 10*3/mm3    RBC 3.78 3.77 - 5.28 10*6/mm3    Hemoglobin 11.3 (L) 12.0 - 15.9 g/dL    Hematocrit 34.1 34.0 - 46.6 %    MCV 90.2 79.0 - 97.0 fL    MCH 29.9 26.6 - 33.0 pg    MCHC 33.1 31.5 - 35.7 g/dL    RDW 12.3 12.3 - 15.4 %    RDW-SD 39.9 37.0 - 54.0 fl    MPV 8.9 6.0 - 12.0 fL    Platelets 188 140 - 450 10*3/mm3    Neutrophil % 84.8 (H) 42.7 - 76.0 %    Lymphocyte % 8.4 (L) 19.6 - 45.3 %    Monocyte % 5.7 5.0 - 12.0 %    Eosinophil % 0.6 0.3 - 6.2 %    Basophil % 0.2 0.0 - 1.5 %    Immature Grans % 0.3 0.0 - 0.5 %    Neutrophils, Absolute 7.95 (H) 1.70 - 7.00 10*3/mm3    Lymphocytes, Absolute 0.79 0.70 - 3.10 10*3/mm3    Monocytes, Absolute 0.53 0.10 - 0.90 10*3/mm3    Eosinophils, Absolute 0.06 0.00 - 0.40 10*3/mm3    Basophils, Absolute 0.02 0.00 - 0.20 10*3/mm3    Immature Grans, Absolute 0.03 0.00 - 0.05 10*3/mm3    nRBC 0.0 0.0 - 0.2 /100 WBC   ECG 12 Lead Other; fever, weakness    Collection Time: 04/05/25  7:32 PM   Result Value Ref Range    QT Interval 384 ms    QTC Interval 448 ms   Respiratory Panel PCR w/COVID-19(SARS-CoV-2) [facility] In-House, NP Swab in [facility]/VTM, 2 HR TAT - Swab, Nasopharynx    Collection Time: 04/05/25  8:11 PM    Specimen: Nasopharynx; Swab   Result Value Ref Range    ADENOVIRUS, PCR Not Detected Not Detected    Coronavirus 229E Not Detected Not Detected    Coronavirus HKU1 Not Detected Not Detected    Coronavirus NL63 Not Detected Not Detected    Coronavirus OC43 Not Detected Not Detected    COVID19 Not Detected Not Detected - Ref. Range    Human Metapneumovirus Not Detected Not Detected    Human Rhinovirus/Enterovirus Not Detected Not Detected    Influenza A PCR Not Detected Not Detected    Influenza B PCR Not Detected Not Detected    Parainfluenza Virus 1 Not Detected Not Detected    Parainfluenza Virus 2 Not Detected Not Detected    Parainfluenza Virus 3 Not Detected Not Detected    Parainfluenza  Virus 4 Not Detected Not Detected    RSV, PCR Not Detected Not Detected    Bordetella pertussis pcr Not Detected Not Detected    Bordetella parapertussis PCR Not Detected Not Detected    Chlamydophila pneumoniae PCR Not Detected Not Detected    Mycoplasma pneumo by PCR Not Detected Not Detected   Urinalysis With Culture If Indicated - Urine, Clean Catch    Collection Time: 25  9:34 PM    Specimen: Urine, Clean Catch   Result Value Ref Range    Color, UA Yellow Yellow, Straw    Appearance, UA Clear Clear    pH, UA 6.5 5.0 - 8.0    Specific Gravity, UA 1.041 (H) 1.001 - 1.030    Glucose, UA Negative Negative    Ketones, UA 40 mg/dL (2+) (A) Negative    Bilirubin, UA Negative Negative    Blood, UA Trace (A) Negative    Protein, UA Negative Negative    Leuk Esterase, UA Negative Negative    Nitrite, UA Negative Negative    Urobilinogen, UA 0.2 E.U./dL 0.2 - 1.0 E.U./dL   Urinalysis, Microscopic Only - Urine, Clean Catch    Collection Time: 25  9:34 PM    Specimen: Urine, Clean Catch   Result Value Ref Range    RBC, UA 21-50 (A) None Seen, 0-2 /HPF    WBC, UA 6-10 (A) None Seen, 0-2 /HPF    Bacteria, UA None Seen None Seen, Trace /HPF    Squamous Epithelial Cells, UA 0-2 None Seen, 0-2 /HPF    Hyaline Casts, UA None Seen 0 - 6 /LPF    Methodology Automated Microscopy      Note: In addition to lab results from this visit, the labs listed above may include labs taken at another facility or during a different encounter within the last 24 hours. Please correlate lab times with ED admission and discharge times for further clarification of the services performed during this visit.    CT Abdomen Pelvis With Contrast   Final Result   Impression:   1.Acute appendicitis without evidence of perforation or abscess.   2.Postsurgical changes in the ventral abdominal wall and lower pelvis consistent with recent .   3.Tiny hiatal hernia.   4.Unilateral left-sided pars interarticularis defect at the L5-S1 level.                   Electronically Signed: Nghia Khan MD     4/5/2025 9:20 PM EDT     Workstation ID: [id number]      XR Chest 1 View   Final Result   Impression:   No active disease.            Electronically Signed: Bjorn Hagen MD     4/5/2025 7:28 PM EDT     Workstation ID: [id number]        Vitals:    04/05/25 2245 04/05/25 2300 04/05/25 2323 04/06/25 0314   BP:   126/71 128/77   BP Location:   Left arm Left arm   Patient Position:   Lying Sitting   Pulse: 81 88 90    Resp:   16 16   Temp:   98.3 °F (36.8 °C) 98.1 °F (36.7 °C)   TempSrc:   Oral Oral   SpO2: 100% 98% 98%    Weight:       Height:         Medications   sodium chloride 0.9 % flush 10 mL (has no administration in time range)   HYDROmorphone (DILAUDID) injection 0.5 mg (has no administration in time range)     And   naloxone (NARCAN) injection 0.1 mg (has no administration in time range)   ondansetron ODT (ZOFRAN-ODT) disintegrating tablet 4 mg (has no administration in time range)     Or   ondansetron (ZOFRAN) injection 4 mg (has no administration in time range)   docusate sodium (COLACE) capsule 100 mg (has no administration in time range)   famotidine (PEPCID) tablet 20 mg (20 mg Oral Given 4/5/25 2231)   sodium chloride 0.9 % infusion (100 mL/hr Intravenous New Bag 4/5/25 2327)   acetaminophen (TYLENOL) tablet 650 mg (has no administration in time range)     Or   acetaminophen (TYLENOL) suppository 650 mg (has no administration in time range)   cefOXItin (MEFOXIN) 2,000 mg in sodium chloride 0.9 % 100 mL MBP (has no administration in time range)   Morphine sulfate (PF) injection 4 mg (has no administration in time range)     And   naloxone (NARCAN) injection 0.4 mg (has no administration in time range)   sodium chloride 0.9 % bolus 1,000 mL (0 mL Intravenous Stopped 4/5/25 2118)   acetaminophen (TYLENOL) tablet 650 mg (650 mg Oral Given 4/5/25 1920)   metoclopramide (REGLAN) injection 10 mg (10 mg Intravenous Given 4/5/25 1920)   iopamidol (ISOVUE-300)  61 % injection 100 mL (75 mL Intravenous Given 4/5/25 2035)   cefOXItin (MEFOXIN) 2,000 mg in sodium chloride 0.9 % 100 mL MBP (2,000 mg Intravenous New Bag 4/5/25 2232)     ECG/EMG Results (last 24 hours)       Procedure Component Value Units Date/Time    ECG 12 Lead Other; fever, weakness [[id number]] Collected: 04/05/25 1932     Updated: 04/05/25 1932     QT Interval 384 ms      QTC Interval 448 ms     Narrative:      Test Reason : Other~  Blood Pressure :   */*   mmHG  Vent. Rate :  82 BPM     Atrial Rate :  82 BPM     P-R Int : 160 ms          QRS Dur :  70 ms      QT Int : 384 ms       P-R-T Axes :  57   5  17 degrees    QTcB Int : 448 ms    Normal sinus rhythm  Cannot rule out Anterior infarct (cited on or before 24-Sep-2024)  Abnormal ECG  When compared with ECG of 24-Sep-2024 20:02,  No significant change was found    Referred By: EDMD           Confirmed By:           ECG 12 Lead Other; fever, weakness   Preliminary Result   Test Reason : Other~   Blood Pressure :   */*   mmHG   Vent. Rate :  82 BPM     Atrial Rate :  82 BPM      P-R Int : 160 ms          QRS Dur :  70 ms       QT Int : 384 ms       P-R-T Axes :  57   5  17 degrees     QTcB Int : 448 ms      Normal sinus rhythm   Cannot rule out Anterior infarct (cited on or before 24-Sep-2024)   Abnormal ECG   When compared with ECG of 24-Sep-2024 20:02,   No significant change was found      Referred By: EDMD           Confirmed By:                  Medical Decision Making  Amount and/or Complexity of Data Reviewed  Labs: ordered.  Radiology: ordered.  ECG/medicine tests: ordered.    Risk  OTC drugs.  Prescription drug management.  Decision regarding hospitalization.        Final diagnoses:   Acute appendicitis with localized peritonitis, without perforation, abscess, or gangrene   Fever and chills   Nausea and vomiting, unspecified vomiting type   Mastitis   Breast feeding status of mother   History of gastric restrictive surgery   History of  Clostridioides difficile infection       ED Disposition  ED Disposition       ED Disposition   Decision to Admit    Condition   --    Comment   Level of Care: Med/Surg [1]   Diagnosis: Acute appendicitis [264549]   Is patient appropriate for Inpatient Observation Unit?: Yes [1]                 No follow-up provider specified.       Medication List      No changes were made to your prescriptions during this visit.            Kriss Rosales PA-C  04/06/25 0353

## 2025-04-06 ENCOUNTER — ANESTHESIA EVENT (OUTPATIENT)
Dept: PERIOP | Facility: HOSPITAL | Age: 40
End: 2025-04-06
Payer: COMMERCIAL

## 2025-04-06 ENCOUNTER — ANESTHESIA (OUTPATIENT)
Dept: PERIOP | Facility: HOSPITAL | Age: 40
End: 2025-04-06
Payer: COMMERCIAL

## 2025-04-06 VITALS
WEIGHT: 191 LBS | OXYGEN SATURATION: 92 % | TEMPERATURE: 98 F | SYSTOLIC BLOOD PRESSURE: 123 MMHG | DIASTOLIC BLOOD PRESSURE: 81 MMHG | HEART RATE: 83 BPM | BODY MASS INDEX: 33.84 KG/M2 | RESPIRATION RATE: 16 BRPM | HEIGHT: 63 IN

## 2025-04-06 LAB
ANION GAP SERPL CALCULATED.3IONS-SCNC: 11 MMOL/L (ref 5–15)
BASOPHILS # BLD AUTO: 0.01 10*3/MM3 (ref 0–0.2)
BASOPHILS NFR BLD AUTO: 0.2 % (ref 0–1.5)
BUN SERPL-MCNC: 7 MG/DL (ref 6–20)
BUN/CREAT SERPL: 10.1 (ref 7–25)
CALCIUM SPEC-SCNC: 8.2 MG/DL (ref 8.6–10.5)
CHLORIDE SERPL-SCNC: 106 MMOL/L (ref 98–107)
CO2 SERPL-SCNC: 21 MMOL/L (ref 22–29)
CREAT SERPL-MCNC: 0.69 MG/DL (ref 0.57–1)
DEPRECATED RDW RBC AUTO: 40.1 FL (ref 37–54)
EGFRCR SERPLBLD CKD-EPI 2021: 113.4 ML/MIN/1.73
EOSINOPHIL # BLD AUTO: 0.01 10*3/MM3 (ref 0–0.4)
EOSINOPHIL NFR BLD AUTO: 0.2 % (ref 0.3–6.2)
ERYTHROCYTE [DISTWIDTH] IN BLOOD BY AUTOMATED COUNT: 12.4 % (ref 12.3–15.4)
GLUCOSE SERPL-MCNC: 166 MG/DL (ref 65–99)
HCT VFR BLD AUTO: 30.9 % (ref 34–46.6)
HGB BLD-MCNC: 10.4 G/DL (ref 12–15.9)
IMM GRANULOCYTES # BLD AUTO: 0.04 10*3/MM3 (ref 0–0.05)
IMM GRANULOCYTES NFR BLD AUTO: 0.7 % (ref 0–0.5)
LYMPHOCYTES # BLD AUTO: 0.54 10*3/MM3 (ref 0.7–3.1)
LYMPHOCYTES NFR BLD AUTO: 9.6 % (ref 19.6–45.3)
MCH RBC QN AUTO: 30.1 PG (ref 26.6–33)
MCHC RBC AUTO-ENTMCNC: 33.7 G/DL (ref 31.5–35.7)
MCV RBC AUTO: 89.3 FL (ref 79–97)
MONOCYTES # BLD AUTO: 0.11 10*3/MM3 (ref 0.1–0.9)
MONOCYTES NFR BLD AUTO: 2 % (ref 5–12)
NEUTROPHILS NFR BLD AUTO: 4.92 10*3/MM3 (ref 1.7–7)
NEUTROPHILS NFR BLD AUTO: 87.3 % (ref 42.7–76)
NRBC BLD AUTO-RTO: 0 /100 WBC (ref 0–0.2)
PLATELET # BLD AUTO: 141 10*3/MM3 (ref 140–450)
PMV BLD AUTO: 8.7 FL (ref 6–12)
POTASSIUM SERPL-SCNC: 3.9 MMOL/L (ref 3.5–5.2)
RBC # BLD AUTO: 3.46 10*6/MM3 (ref 3.77–5.28)
SODIUM SERPL-SCNC: 138 MMOL/L (ref 136–145)
WBC NRBC COR # BLD AUTO: 5.63 10*3/MM3 (ref 3.4–10.8)

## 2025-04-06 PROCEDURE — 85025 COMPLETE CBC W/AUTO DIFF WBC: CPT | Performed by: SURGERY

## 2025-04-06 PROCEDURE — 25010000002 LIDOCAINE PF 1% 1 % SOLUTION: Performed by: NURSE ANESTHETIST, CERTIFIED REGISTERED

## 2025-04-06 PROCEDURE — 88304 TISSUE EXAM BY PATHOLOGIST: CPT | Performed by: SURGERY

## 2025-04-06 PROCEDURE — 96375 TX/PRO/DX INJ NEW DRUG ADDON: CPT

## 2025-04-06 PROCEDURE — 25010000002 GLYCOPYRROLATE 1 MG/5ML SOLUTION: Performed by: NURSE ANESTHETIST, CERTIFIED REGISTERED

## 2025-04-06 PROCEDURE — 25010000002 DEXAMETHASONE PER 1 MG: Performed by: NURSE ANESTHETIST, CERTIFIED REGISTERED

## 2025-04-06 PROCEDURE — 25010000002 PROPOFOL 10 MG/ML EMULSION: Performed by: NURSE ANESTHETIST, CERTIFIED REGISTERED

## 2025-04-06 PROCEDURE — 25010000002 SUGAMMADEX 200 MG/2ML SOLUTION: Performed by: NURSE ANESTHETIST, CERTIFIED REGISTERED

## 2025-04-06 PROCEDURE — G0378 HOSPITAL OBSERVATION PER HR: HCPCS

## 2025-04-06 PROCEDURE — 25010000002 HYDROMORPHONE PER 4 MG: Performed by: SURGERY

## 2025-04-06 PROCEDURE — 25010000002 FENTANYL CITRATE (PF) 100 MCG/2ML SOLUTION: Performed by: NURSE ANESTHETIST, CERTIFIED REGISTERED

## 2025-04-06 PROCEDURE — 25810000003 LACTATED RINGERS PER 1000 ML: Performed by: NURSE ANESTHETIST, CERTIFIED REGISTERED

## 2025-04-06 PROCEDURE — 80048 BASIC METABOLIC PNL TOTAL CA: CPT | Performed by: SURGERY

## 2025-04-06 PROCEDURE — 25010000002 CEFOXITIN PER 1 G: Performed by: SURGERY

## 2025-04-06 PROCEDURE — 25010000002 ONDANSETRON PER 1 MG: Performed by: NURSE ANESTHETIST, CERTIFIED REGISTERED

## 2025-04-06 DEVICE — LIGAMAX 5 MM ENDOSCOPIC MULTIPLE CLIP APPLIER
Type: IMPLANTABLE DEVICE | Site: ABDOMEN | Status: FUNCTIONAL
Brand: LIGAMAX

## 2025-04-06 DEVICE — THE ECHELON, ECHELON ENDOPATH™ AND ECHELON FLEX™ FAMILIES OF ENDOSCOPIC LINEAR CUTTERS AND RELOADS ARE STERILE, SINGLE PATIENT USE INSTRUMENTS THAT SIMULTANEOUSLY CUT AND STAPLE TISSUE. THERE ARE SIX STAGGERED ROWS OF STAPLES, THREE ON EITHER SIDE OF THE CUT LINE. THE 45 MM INSTRUMENTS HAVE A STAPLE LINE THATIS APPROXIMATELY 45 MM LONG AND A CUT LINE THAT IS APPROXIMATELY 42 MM LONG. THE SHAFT CAN ROTATE FREELY IN BOTH DIRECTIONS AND AN ARTICULATION MECHANISM ON ARTICULATING INSTRUMENTS ENABLES BENDING THE DISTAL PORTIONOF THE SHAFT TO FACILITATE LATERAL ACCESS OF THE OPERATIVE SITE.THE INSTRUMENTS ARE SHIPPED WITHOUT A RELOAD AND MUST BE LOADED PRIOR TO USE. A STAPLE RETAINING CAP ON THE RELOAD PROTECTS THE STAPLE LEG POINTS DURING SHIPPING AND TRANSPORTATION. THE INSTRUMENTS’ LOCK-OUT FEATURE IS DESIGNED TO PREVENT A USED RELOAD FROM BEING REFIRED.
Type: IMPLANTABLE DEVICE | Site: ABDOMEN | Status: FUNCTIONAL
Brand: ECHELON ENDOPATH

## 2025-04-06 RX ORDER — HYDROMORPHONE HYDROCHLORIDE 1 MG/ML
0.5 INJECTION, SOLUTION INTRAMUSCULAR; INTRAVENOUS; SUBCUTANEOUS
Status: DISCONTINUED | OUTPATIENT
Start: 2025-04-06 | End: 2025-04-06

## 2025-04-06 RX ORDER — ONDANSETRON 2 MG/ML
INJECTION INTRAMUSCULAR; INTRAVENOUS AS NEEDED
Status: DISCONTINUED | OUTPATIENT
Start: 2025-04-06 | End: 2025-04-06 | Stop reason: SURG

## 2025-04-06 RX ORDER — DROPERIDOL 2.5 MG/ML
0.62 INJECTION, SOLUTION INTRAMUSCULAR; INTRAVENOUS
Status: DISCONTINUED | OUTPATIENT
Start: 2025-04-06 | End: 2025-04-06

## 2025-04-06 RX ORDER — ONDANSETRON 2 MG/ML
4 INJECTION INTRAMUSCULAR; INTRAVENOUS ONCE AS NEEDED
Status: DISCONTINUED | OUTPATIENT
Start: 2025-04-06 | End: 2025-04-06

## 2025-04-06 RX ORDER — PROMETHAZINE HYDROCHLORIDE 25 MG/1
25 SUPPOSITORY RECTAL ONCE AS NEEDED
Status: DISCONTINUED | OUTPATIENT
Start: 2025-04-06 | End: 2025-04-06

## 2025-04-06 RX ORDER — HYDROCODONE BITARTRATE AND ACETAMINOPHEN 5; 325 MG/1; MG/1
1 TABLET ORAL ONCE AS NEEDED
Status: DISCONTINUED | OUTPATIENT
Start: 2025-04-06 | End: 2025-04-06

## 2025-04-06 RX ORDER — SODIUM CHLORIDE 9 MG/ML
9 INJECTION, SOLUTION INTRAVENOUS AS NEEDED
Status: DISCONTINUED | OUTPATIENT
Start: 2025-04-06 | End: 2025-04-06

## 2025-04-06 RX ORDER — ACETAMINOPHEN 650 MG/1
650 SUPPOSITORY RECTAL EVERY 4 HOURS PRN
Status: DISCONTINUED | OUTPATIENT
Start: 2025-04-06 | End: 2025-04-06 | Stop reason: HOSPADM

## 2025-04-06 RX ORDER — ROCURONIUM BROMIDE 10 MG/ML
INJECTION, SOLUTION INTRAVENOUS AS NEEDED
Status: DISCONTINUED | OUTPATIENT
Start: 2025-04-06 | End: 2025-04-06 | Stop reason: SURG

## 2025-04-06 RX ORDER — ACETAMINOPHEN 325 MG/1
650 TABLET ORAL EVERY 4 HOURS PRN
Status: DISCONTINUED | OUTPATIENT
Start: 2025-04-06 | End: 2025-04-06 | Stop reason: HOSPADM

## 2025-04-06 RX ORDER — LIDOCAINE HYDROCHLORIDE 40 MG/ML
SOLUTION TOPICAL AS NEEDED
Status: DISCONTINUED | OUTPATIENT
Start: 2025-04-06 | End: 2025-04-06 | Stop reason: SURG

## 2025-04-06 RX ORDER — GLYCOPYRROLATE 0.2 MG/ML
INJECTION INTRAMUSCULAR; INTRAVENOUS AS NEEDED
Status: DISCONTINUED | OUTPATIENT
Start: 2025-04-06 | End: 2025-04-06 | Stop reason: SURG

## 2025-04-06 RX ORDER — DEXMEDETOMIDINE HYDROCHLORIDE 4 UG/ML
INJECTION, SOLUTION INTRAVENOUS AS NEEDED
Status: DISCONTINUED | OUTPATIENT
Start: 2025-04-06 | End: 2025-04-06 | Stop reason: SURG

## 2025-04-06 RX ORDER — HYDRALAZINE HYDROCHLORIDE 20 MG/ML
5 INJECTION INTRAMUSCULAR; INTRAVENOUS
Status: DISCONTINUED | OUTPATIENT
Start: 2025-04-06 | End: 2025-04-06

## 2025-04-06 RX ORDER — FENTANYL CITRATE 50 UG/ML
INJECTION, SOLUTION INTRAMUSCULAR; INTRAVENOUS AS NEEDED
Status: DISCONTINUED | OUTPATIENT
Start: 2025-04-06 | End: 2025-04-06 | Stop reason: SURG

## 2025-04-06 RX ORDER — OXYCODONE AND ACETAMINOPHEN 7.5; 325 MG/1; MG/1
1 TABLET ORAL EVERY 4 HOURS PRN
Status: DISCONTINUED | OUTPATIENT
Start: 2025-04-06 | End: 2025-04-06

## 2025-04-06 RX ORDER — IPRATROPIUM BROMIDE AND ALBUTEROL SULFATE 2.5; .5 MG/3ML; MG/3ML
3 SOLUTION RESPIRATORY (INHALATION) ONCE AS NEEDED
Status: DISCONTINUED | OUTPATIENT
Start: 2025-04-06 | End: 2025-04-06

## 2025-04-06 RX ORDER — LIDOCAINE HYDROCHLORIDE 10 MG/ML
INJECTION, SOLUTION EPIDURAL; INFILTRATION; INTRACAUDAL; PERINEURAL AS NEEDED
Status: DISCONTINUED | OUTPATIENT
Start: 2025-04-06 | End: 2025-04-06 | Stop reason: SURG

## 2025-04-06 RX ORDER — NALOXONE HCL 0.4 MG/ML
0.4 VIAL (ML) INJECTION AS NEEDED
Status: DISCONTINUED | OUTPATIENT
Start: 2025-04-06 | End: 2025-04-06

## 2025-04-06 RX ORDER — OXYCODONE AND ACETAMINOPHEN 5; 325 MG/1; MG/1
1 TABLET ORAL EVERY 4 HOURS PRN
Status: DISCONTINUED | OUTPATIENT
Start: 2025-04-06 | End: 2025-04-06 | Stop reason: HOSPADM

## 2025-04-06 RX ORDER — PSEUDOEPHEDRINE HCL 30 MG
100 TABLET ORAL 2 TIMES DAILY PRN
Qty: 30 CAPSULE | Refills: 0 | Status: SHIPPED | OUTPATIENT
Start: 2025-04-06

## 2025-04-06 RX ORDER — LABETALOL HYDROCHLORIDE 5 MG/ML
5 INJECTION, SOLUTION INTRAVENOUS
Status: DISCONTINUED | OUTPATIENT
Start: 2025-04-06 | End: 2025-04-06

## 2025-04-06 RX ORDER — OXYCODONE AND ACETAMINOPHEN 5; 325 MG/1; MG/1
1 TABLET ORAL EVERY 4 HOURS PRN
Qty: 10 TABLET | Refills: 0 | Status: SHIPPED | OUTPATIENT
Start: 2025-04-06 | End: 2025-04-16

## 2025-04-06 RX ORDER — SUCCINYLCHOLINE/SOD CL,ISO/PF 200MG/10ML
SYRINGE (ML) INTRAVENOUS AS NEEDED
Status: DISCONTINUED | OUTPATIENT
Start: 2025-04-06 | End: 2025-04-06 | Stop reason: SURG

## 2025-04-06 RX ORDER — SODIUM CHLORIDE 0.9 % (FLUSH) 0.9 %
3-10 SYRINGE (ML) INJECTION AS NEEDED
Status: DISCONTINUED | OUTPATIENT
Start: 2025-04-06 | End: 2025-04-06

## 2025-04-06 RX ORDER — SODIUM CHLORIDE, SODIUM LACTATE, POTASSIUM CHLORIDE, CALCIUM CHLORIDE 600; 310; 30; 20 MG/100ML; MG/100ML; MG/100ML; MG/100ML
INJECTION, SOLUTION INTRAVENOUS CONTINUOUS PRN
Status: DISCONTINUED | OUTPATIENT
Start: 2025-04-06 | End: 2025-04-06 | Stop reason: SURG

## 2025-04-06 RX ORDER — BUPIVACAINE HYDROCHLORIDE AND EPINEPHRINE 2.5; 5 MG/ML; UG/ML
INJECTION, SOLUTION EPIDURAL; INFILTRATION; INTRACAUDAL; PERINEURAL AS NEEDED
Status: DISCONTINUED | OUTPATIENT
Start: 2025-04-06 | End: 2025-04-06 | Stop reason: HOSPADM

## 2025-04-06 RX ORDER — SODIUM CHLORIDE, SODIUM LACTATE, POTASSIUM CHLORIDE, CALCIUM CHLORIDE 600; 310; 30; 20 MG/100ML; MG/100ML; MG/100ML; MG/100ML
9 INJECTION, SOLUTION INTRAVENOUS CONTINUOUS
Status: DISCONTINUED | OUTPATIENT
Start: 2025-04-06 | End: 2025-04-06

## 2025-04-06 RX ORDER — SODIUM CHLORIDE 0.9 % (FLUSH) 0.9 %
3 SYRINGE (ML) INJECTION EVERY 12 HOURS SCHEDULED
Status: DISCONTINUED | OUTPATIENT
Start: 2025-04-06 | End: 2025-04-06

## 2025-04-06 RX ORDER — DROPERIDOL 2.5 MG/ML
0.62 INJECTION, SOLUTION INTRAMUSCULAR; INTRAVENOUS ONCE AS NEEDED
Status: DISCONTINUED | OUTPATIENT
Start: 2025-04-06 | End: 2025-04-06

## 2025-04-06 RX ORDER — FENTANYL CITRATE 50 UG/ML
50 INJECTION, SOLUTION INTRAMUSCULAR; INTRAVENOUS
Status: DISCONTINUED | OUTPATIENT
Start: 2025-04-06 | End: 2025-04-06

## 2025-04-06 RX ORDER — DEXAMETHASONE SODIUM PHOSPHATE 4 MG/ML
INJECTION, SOLUTION INTRA-ARTICULAR; INTRALESIONAL; INTRAMUSCULAR; INTRAVENOUS; SOFT TISSUE AS NEEDED
Status: DISCONTINUED | OUTPATIENT
Start: 2025-04-06 | End: 2025-04-06 | Stop reason: SURG

## 2025-04-06 RX ORDER — PROMETHAZINE HYDROCHLORIDE 25 MG/1
25 TABLET ORAL ONCE AS NEEDED
Status: DISCONTINUED | OUTPATIENT
Start: 2025-04-06 | End: 2025-04-06

## 2025-04-06 RX ORDER — PROPOFOL 10 MG/ML
VIAL (ML) INTRAVENOUS AS NEEDED
Status: DISCONTINUED | OUTPATIENT
Start: 2025-04-06 | End: 2025-04-06 | Stop reason: SURG

## 2025-04-06 RX ADMIN — LIDOCAINE HYDROCHLORIDE 100 MG: 10 INJECTION, SOLUTION EPIDURAL; INFILTRATION; INTRACAUDAL; PERINEURAL at 08:11

## 2025-04-06 RX ADMIN — ROCURONIUM BROMIDE 30 MG: 10 INJECTION INTRAVENOUS at 08:20

## 2025-04-06 RX ADMIN — CEFOXITIN 2000 MG: 2 INJECTION, POWDER, FOR SOLUTION INTRAVENOUS at 10:20

## 2025-04-06 RX ADMIN — FENTANYL CITRATE 100 MCG: 50 INJECTION, SOLUTION INTRAMUSCULAR; INTRAVENOUS at 08:09

## 2025-04-06 RX ADMIN — PROPOFOL 50 MG: 10 INJECTION, EMULSION INTRAVENOUS at 08:12

## 2025-04-06 RX ADMIN — ONDANSETRON 4 MG: 2 INJECTION INTRAMUSCULAR; INTRAVENOUS at 08:20

## 2025-04-06 RX ADMIN — DEXAMETHASONE SODIUM PHOSPHATE 4 MG: 4 INJECTION INTRA-ARTICULAR; INTRALESIONAL; INTRAMUSCULAR; INTRAVENOUS; SOFT TISSUE at 08:33

## 2025-04-06 RX ADMIN — DEXMEDETOMIDINE HYDROCHLORIDE IN 0.9% SODIUM CHLORIDE 4 MCG: 4 INJECTION INTRAVENOUS at 08:17

## 2025-04-06 RX ADMIN — SODIUM CHLORIDE, POTASSIUM CHLORIDE, SODIUM LACTATE AND CALCIUM CHLORIDE: 600; 310; 30; 20 INJECTION, SOLUTION INTRAVENOUS at 08:08

## 2025-04-06 RX ADMIN — LIDOCAINE HYDROCHLORIDE 1 EACH: 40 SOLUTION TOPICAL at 08:13

## 2025-04-06 RX ADMIN — DEXAMETHASONE SODIUM PHOSPHATE 4 MG: 4 INJECTION INTRA-ARTICULAR; INTRALESIONAL; INTRAMUSCULAR; INTRAVENOUS; SOFT TISSUE at 08:24

## 2025-04-06 RX ADMIN — DEXAMETHASONE SODIUM PHOSPHATE 4 MG: 4 INJECTION INTRA-ARTICULAR; INTRALESIONAL; INTRAMUSCULAR; INTRAVENOUS; SOFT TISSUE at 08:40

## 2025-04-06 RX ADMIN — FAMOTIDINE 20 MG: 20 TABLET, FILM COATED ORAL at 10:00

## 2025-04-06 RX ADMIN — GLYCOPYRROLATE 0.3 MG: 0.2 INJECTION, SOLUTION INTRAMUSCULAR; INTRAVENOUS at 08:34

## 2025-04-06 RX ADMIN — FENTANYL CITRATE 50 MCG: 50 INJECTION, SOLUTION INTRAMUSCULAR; INTRAVENOUS at 08:40

## 2025-04-06 RX ADMIN — HYDROMORPHONE HYDROCHLORIDE 0.5 MG: 1 INJECTION, SOLUTION INTRAMUSCULAR; INTRAVENOUS; SUBCUTANEOUS at 06:31

## 2025-04-06 RX ADMIN — Medication 140 MG: at 08:12

## 2025-04-06 RX ADMIN — SUGAMMADEX 200 MG: 100 INJECTION, SOLUTION INTRAVENOUS at 08:48

## 2025-04-06 RX ADMIN — DEXAMETHASONE SODIUM PHOSPHATE 4 MG: 4 INJECTION INTRA-ARTICULAR; INTRALESIONAL; INTRAMUSCULAR; INTRAVENOUS; SOFT TISSUE at 08:19

## 2025-04-06 RX ADMIN — PROPOFOL 150 MG: 10 INJECTION, EMULSION INTRAVENOUS at 08:11

## 2025-04-06 RX ADMIN — CEFOXITIN 2000 MG: 2 INJECTION, POWDER, FOR SOLUTION INTRAVENOUS at 04:05

## 2025-04-06 RX ADMIN — ROCURONIUM BROMIDE 10 MG: 10 INJECTION INTRAVENOUS at 08:11

## 2025-04-06 NOTE — BRIEF OP NOTE
APPENDECTOMY LAPAROSCOPIC POSSIBLE OPEN  Progress Note    Brenda Trejo  4/6/2025    Pre-op Diagnosis:      * Acute appendicitis        Post-Op Diagnosis Codes:     * Acute appendicitis     Procedure(s):      Procedure(s):  APPENDECTOMY LAPAROSCOPIC              Surgeon(s):  Napoleon Camp MD    Anesthesia: General    Staff:   Circulator: Kizzy Rogers RN  Scrub Person: Bethany Grajeda  Nursing Assistant: Hardeep Kennedy       Estimated Blood Loss: minimal    Urine Voided: * No values recorded between 4/6/2025  8:06 AM and 4/6/2025  8:58 AM *    Specimens:                Specimens       ID Source Type Tests Collected By Collected At Frozen?    A Large Intestine, Appendix Tissue TISSUE PATHOLOGY EXAM   Napoleon Camp MD 4/6/25 0828               Drains:   [REMOVED] Urethral Catheter Non-latex;Silicone (Removed)   Daily Indications Selected surgeries ( tract, abdomen) 03/06/25 2242   Site Assessment Clean;Skin intact 03/06/25 2242   Collection Container Standard drainage bag 03/06/25 2242   Securement Method Securing device 03/06/25 2242   Output (mL) 450 mL 03/07/25 0445       Findings: Acute appendicitis without evidence of perforation or abscess      Complications: None          Napoleon Camp MD     Date: 4/6/2025  Time: 09:04 EDT

## 2025-04-06 NOTE — PLAN OF CARE
Goal Outcome Evaluation:  Plan of Care Reviewed With: patient        Progress: improving  Outcome Evaluation: ARLEEN HOUSTON. up ad mirtha. pt NPO for appendectomy today. pt 4 weeks postpartem, breastfeeding. was concerned for mastitis and found appendicitis on CT. pt currently afibrile, but was fibrile on admission in ER. no pain at this time. no nausea or vomiting at this time. pt resting well in bed.

## 2025-04-06 NOTE — OP NOTE
Operative Report    Patient Name:  Brenda Trejo  YOB: 1985  [id number]    4/6/2025      PREOPERATIVE DIAGNOSIS: Acute appendicitis       POSTOPERATIVE DIAGNOSIS: Same        PROCEDURE PERFORMED: Laparoscopic appendectomy        SURGEON: Napoleon Camp MD      ASSISTANT: None       SPECIMENS: Appendix and contents        ANESTHESIA: General    EBL: Minimal        FINDINGS:  1. Acutely inflamed appendix without perforation or abscess       INDICATIONS:      The patient is a 39 y.o. female with a history of abdominal pain, concerning for acute appendicitis . Pre-operative imaging including CT scan confirmed the diagnosis. The risks and benefits of laparoscopic appendectomy, possible open were discussed with the patient and they agreed to proceed.          DESCRIPTION OF PROCEDURE:      After obtaining informed consent, the patient was taken to the operating room and placed in the supine position. After appropriate DVT and antibiotic prophylaxis, general anesthesia was induced. The abdomen was prepped and draped in standard sterile fashion, and after infiltrating the skin with local anesthetic, a 12mm skin incision was made superior to the umbilicus. Blunt dissection was carried down to the base of the umbilicus, which was grasped with a Kocher clamp and elevated anteriorly. A vertical midline incision was made at the base of the umbilicus, and blunt dissection was carried down into the peritoneal cavity. A stay suture of 0 Vicryl was then placed in figure-of-eight fashion around the defect, and a blunt trocar advanced without difficulty into the abdominal cavity. The abdomen was insufflated with carbon dioxide gas to a pressure of 15 mmHg, and a laparoscope advanced through the trocar and the abdominal contents were inspected. There was no evidence of bowel, bladder, or visceral injury with entrance of the trocar. At this point, after infiltrating the skin with local anesthetic, a standard  laparoscopic appendectomy trocar placement schema was followed, with care taken to avoid the bladder.     The right lower quadrant was inspected. An acutely inflamed appendix was identified. Using meticulous blunt dissection the base of the appendix was circumferentially cleared, and the appendix was divided flush with the cecum using a stapling device, with care taken not to encroach upon the ileocecal valve. The Enseal device was used to divide the mesoappendix. The appendix was then placed in an Endo Catch bag and removed through the periumbilical trocar site. The specimen was then sent to pathology as a permanent specimen.     The mesenteric and cecal staple lines were inspected, and free of bleeding or leak. The right lower quadrant was irrigated with normal saline until clear, and there was no bleeding seen.        All trocars were removed under direct and laparoscopic visualization. The fascia at the periumbilical incision was closed using 0 Vicryl suture. The wounds were closed in each area using absorbable subcuticular suture. The incisions were dressed in standard sterile fashion. The patient recovered from anesthesia, was extubated in the operating room, and transferred to the PACU in stable condition.  All sponge and needle counts were correct times two at the completion of the procedure.     COMPLICATIONS: None       Napoleon Camp MD  4/6/2025  09:06 EDT

## 2025-04-06 NOTE — ED NOTES
Brenda Trejo    Nursing Report ED to Floor:  Mental status: alert and oriented x4  Ambulatory status: adlib  Oxygen Therapy:  room air  Cardiac Rhythm: sinus tach  Admitted from: ed/home  Safety Concerns:  na  Precautions: na  Social Issues: na  ED Room #:  03    ED Nurse Phone Extension - [phone] or may call [phone].      HPI:   Chief Complaint   Patient presents with    Breast Pain       Past Medical History:  Past Medical History:   Diagnosis Date    Abnormal Pap smear of cervix     Allergic rhinitis     Anxiety     Asthma     controlled    C. difficile diarrhea     10/2021    Chronic back pain 2010    r/t trauma, failed back surgery x 2, denies NSAIDS/steroids/narcotics    Claustrophobia     Depression     Dyspepsia     Dyspnea on exertion     Eczema     on Dupixent    Fatigue     Gout     Migraine     Morbid obesity     Urinary tract infection         Past Surgical History:  Past Surgical History:   Procedure Laterality Date    BREAST BIOPSY  10/2019    CARPAL TUNNEL RELEASE Bilateral      SECTION N/A 3/6/2025    Procedure:  SECTION PRIMARY;  Surgeon: Tiana Gonzáles MD;  Location: [facility] LABOR DELIVERY;  Service: Obstetrics/Gynecology;  Laterality: N/A;    COLONOSCOPY      GASTRIC SLEEVE LAPAROSCOPIC N/A 2023    Procedure: GASTRIC SLEEVE LAPAROSCOPIC WITH DAVINCI ROBOT WITH ESOPHAGOGASTRODUODENOSCOPY;  Surgeon: Ashley Leong MD;  Location: [facility] OR;  Service: Robotics - DaVinci;  Laterality: N/A;    LUMBAR DISCECTOMY Left 2021    Procedure: LUMBAR MICRODISCECTOMY L5-S1;  Surgeon: James Estrada MD;  Location: [facility] OR;  Service: Neurosurgery;  Laterality: Left;    MOUTH SURGERY      Gum Grafts    TONSILLECTOMY AND ADENOIDECTOMY      TRIGGER FINGER RELEASE Right     WISDOM TOOTH EXTRACTION          Admitting Doctor:   No admitting provider for patient encounter.    Consulting Provider(s):  Consults       No orders found from  3/7/2025 to 4/6/2025.             Admitting Diagnosis:   There were no encounter diagnoses.    Most Recent Vitals:   Vitals:    04/05/25 2145 04/05/25 2200 04/05/25 2215 04/05/25 2230   BP:       BP Location:       Patient Position:       Pulse: 81 82 87 95   Resp:       Temp:       TempSrc:       SpO2: 97% 97% 97% 98%   Weight:       Height:           Active LDAs/IV Access:   Lines, Drains & Airways       Active LDAs       Name Placement date Placement time Site Days    Peripheral IV 04/05/25 1920 Right Antecubital 04/05/25 1920  Antecubital  less than 1                    Labs (abnormal labs have a star):   Labs Reviewed   URINALYSIS W/ CULTURE IF INDICATED - Abnormal; Notable for the following components:       Result Value    Specific Gravity, UA 1.041 (*)     Ketones, UA 40 mg/dL (2+) (*)     Blood, UA Trace (*)     All other components within normal limits    Narrative:     In absence of clinical symptoms, the presence of pyuria, bacteria, and/or nitrites on the urinalysis result does not correlate with infection.   MAGNESIUM - Abnormal; Notable for the following components:    Magnesium 1.5 (*)     All other components within normal limits   CBC WITH AUTO DIFFERENTIAL - Abnormal; Notable for the following components:    Hemoglobin 11.3 (*)     Neutrophil % 84.8 (*)     Lymphocyte % 8.4 (*)     Neutrophils, Absolute 7.95 (*)     All other components within normal limits   URINALYSIS, MICROSCOPIC ONLY - Abnormal; Notable for the following components:    RBC, UA 21-50 (*)     WBC, UA 6-10 (*)     All other components within normal limits   RESPIRATORY PANEL PCR W/ COVID-19 (SARS-COV-2), NP SWAB IN UTM/VTP, 2 HR TAT - Normal    Narrative:     In the setting of a positive respiratory panel with a viral infection PLUS a negative procalcitonin without other underlying concern for bacterial infection, consider observing off antibiotics or discontinuation of antibiotics and continue supportive care. If the  "respiratory panel is positive for atypical bacterial infection (Bordetella pertussis, Chlamydophila pneumoniae, or Mycoplasma pneumoniae), consider antibiotic de-escalation to target atypical bacterial infection.   LACTIC ACID, PLASMA - Normal   PROCALCITONIN - Normal    Narrative:     As a Marker for Sepsis (Non-Neonates):    1. <0.5 ng/mL represents a low risk of severe sepsis and/or septic shock.  2. >2 ng/mL represents a high risk of severe sepsis and/or septic shock.    As a Marker for Lower Respiratory Tract Infections that require antibiotic therapy:    PCT on Admission    Antibiotic Therapy       6-12 Hrs later    >0.5                Strongly Recommended  >0.25 - <0.5        Recommended   0.1 - 0.25          Discouraged              Remeasure/reassess PCT  <0.1                Strongly Discouraged     Remeasure/reassess PCT    As 28 day mortality risk marker: \"Change in Procalcitonin Result\" (>80% or <=80%) if Day 0 (or Day 1) and Day 4 values are available. Refer to [email or web address]    Change in PCT <=80%  A decrease of PCT levels below or equal to 80% defines a positive change in PCT test result representing a higher risk for 28-day all-cause mortality of patients diagnosed with severe sepsis for septic shock.    Change in PCT >80%  A decrease of PCT levels of more than 80% defines a negative change in PCT result representing a lower risk for 28-day all-cause mortality of patients diagnosed with severe sepsis or septic shock.      COVID PRE-OP / PRE-PROCEDURE SCREENING ORDER (NO ISOLATION)    Narrative:     The following orders were created for panel order COVID PRE-OP / PRE-PROCEDURE SCREENING ORDER (NO ISOLATION) - Swab, Nasopharynx.  Procedure                               Abnormality         Status                     ---------                               -----------         ------                     Respiratory Panel PCR w/...[[id number]]  Normal              Final result          "        Please view results for these tests on the individual orders.   BLOOD CULTURE   BLOOD CULTURE   URINE CULTURE   RAINBOW DRAW    Narrative:     The following orders were created for panel order [place] Draw.  Procedure                               Abnormality         Status                     ---------                               -----------         ------                     Green Top (Gel)[[id number]]                                  Final result               Lavender Top[[id number]]                                     Final result               Gold Top - SST[[id number]]                                   Final result               Pires Top[[id number]]                                         Final result               Light Blue Top[[id number]]                                   Final result                 Please view results for these tests on the individual orders.   COMPREHENSIVE METABOLIC PANEL    Narrative:     GFR Categories in Chronic Kidney Disease (CKD)      GFR Category          GFR (mL/min/1.73)    Interpretation  G1                     90 or greater         Normal or high (1)  G2                      60-89                Mild decrease (1)  G3a                   45-59                Mild to moderate decrease  G3b                   30-44                Moderate to severe decrease  G4                    15-29                Severe decrease  G5                    14 or less           Kidney failure          (1)In the absence of evidence of kidney disease, neither GFR category G1 or G2 fulfill the criteria for CKD.    eGFR calculation 2021 CKD-EPI creatinine equation, which does not include race as a factor   BASIC METABOLIC PANEL   CBC WITH AUTO DIFFERENTIAL   GREEN TOP   LAVENDER TOP   GOLD TOP - SST   GRAY TOP   LIGHT BLUE TOP   CBC AND DIFFERENTIAL    Narrative:     The following orders were created for panel order CBC & Differential.  Procedure                               Abnormality          Status                     ---------                               -----------         ------                     CBC Auto Differential[[id number]]        Abnormal            Final result                 Please view results for these tests on the individual orders.   CBC AND DIFFERENTIAL    Narrative:     The following orders were created for panel order CBC & Differential.  Procedure                               Abnormality         Status                     ---------                               -----------         ------                     CBC Auto Differential[[id number]]                                                         Please view results for these tests on the individual orders.       Meds Given in ED:   Medications   sodium chloride 0.9 % flush 10 mL (has no administration in time range)   cefOXItin (MEFOXIN) 2,000 mg in sodium chloride 0.9 % 100 mL MBP (2,000 mg Intravenous New Bag 4/5/25 2232)   HYDROmorphone (DILAUDID) injection 0.5 mg (has no administration in time range)     And   naloxone (NARCAN) injection 0.1 mg (has no administration in time range)   ondansetron ODT (ZOFRAN-ODT) disintegrating tablet 4 mg (has no administration in time range)     Or   ondansetron (ZOFRAN) injection 4 mg (has no administration in time range)   docusate sodium (COLACE) capsule 100 mg (has no administration in time range)   famotidine (PEPCID) tablet 20 mg (20 mg Oral Given 4/5/25 2231)   sodium chloride 0.9 % infusion (has no administration in time range)   acetaminophen (TYLENOL) tablet 650 mg (has no administration in time range)     Or   acetaminophen (TYLENOL) suppository 650 mg (has no administration in time range)   cefOXItin (MEFOXIN) 2,000 mg in sodium chloride 0.9 % 100 mL MBP (has no administration in time range)   Morphine sulfate (PF) injection 4 mg (has no administration in time range)     And   naloxone (NARCAN) injection 0.4 mg (has no administration in time range)   sodium chloride 0.9 % bolus  1,000 mL (0 mL Intravenous Stopped 4/5/25 2118)   acetaminophen (TYLENOL) tablet 650 mg (650 mg Oral Given 4/5/25 1920)   metoclopramide (REGLAN) injection 10 mg (10 mg Intravenous Given 4/5/25 1920)   iopamidol (ISOVUE-300) 61 % injection 100 mL (75 mL Intravenous Given 4/5/25 2035)     [START ON 4/6/2025] sodium chloride, 100 mL/hr         Last NIH score:                                                          Dysphagia screening results:  Patient Factors Component (Dysphagia:Stroke or Rule-out)  Best Eye Response: 4-->(E4) spontaneous (04/05/25 1924)  Best Motor Response: 6-->(M6) obeys commands (04/05/25 1924)  Best Verbal Response: 5-->(V5) oriented (04/05/25 1924)  [place] Coma Scale Score: 15 (04/05/25 1924)     [place] Coma Scale:  No data recorded     CIWA:        Restraint Type:            Isolation Status:  No active isolations

## 2025-04-06 NOTE — ANESTHESIA PROCEDURE NOTES
Airway  Reason: elective    Date/Time: 4/6/2025 8:13 AM  Airway not difficult    General Information and Staff    Patient location during procedure: OR  CRNA/CAA: Nuvia Sunshine CRNA    Indications and Patient Condition  Indications for airway management: airway protection    Preoxygenated: yes  MILS not maintained throughout    Mask difficulty assessment: 0 - not attempted    Final Airway Details    Final airway type: endotracheal airway      Successful airway: ETT  Cuffed: yes   Successful intubation technique: video laryngoscopy and RSI  Adjuncts used in placement: intubating stylet and cricoid pressure  Endotracheal tube insertion site: oral  Blade: Gregory  Blade size: D  ETT size (mm): 7.0  Cormack-Lehane Classification: grade I - full view of glottis  Placement verified by: chest auscultation and capnometry   Cuff volume (mL): 7  Measured from: lips  ETT/EBT  to lips (cm): 22  Number of attempts at approach: 1  Assessment: lips, teeth, and gum same as pre-op and atraumatic intubation    Additional Comments  Gregory used electively. Negative epigastric sounds, Breath sound equal bilaterally with symmetric chest rise and fall.

## 2025-04-06 NOTE — ANESTHESIA POSTPROCEDURE EVALUATION
Patient: Brenda Trejo    Procedure Summary       Date: 04/06/25 Room / Location:  [facility] OR [address] [place] [address] [facility]    Anesthesia Start: 0806 Anesthesia Stop: 0903    Procedure: APPENDECTOMY LAPAROSCOPIC (Abdomen) Diagnosis: Acute appendicitis    Surgeons: Napoleon Camp MD Provider: Amaury Mendenhall MD    Anesthesia Type: general ASA Status: 2            Anesthesia Type: general    Vitals  Vitals Value Taken Time   /92 04/06/25 10:04   Temp 98.2 °F (36.8 °C) 04/06/25 10:04   Pulse 85 04/06/25 10:04   Resp 16 04/06/25 10:04   SpO2 96 % 04/06/25 09:51           Post Anesthesia Care and Evaluation    Patient location during evaluation: PACU  Patient participation: complete - patient participated  Level of consciousness: awake and alert  Pain management: adequate    Airway patency: patent  Anesthetic complications: No anesthetic complications  PONV Status: none  Cardiovascular status: hemodynamically stable and acceptable  Respiratory status: nonlabored ventilation, acceptable and nasal cannula  Hydration status: acceptable

## 2025-04-06 NOTE — DISCHARGE SUMMARY
Patient Name:  Brenda Trejo  YOB: 1985  [id number]      Date of Discharge:  4/6/2025    Discharge Diagnosis:   Acute appendicitis    Problem List:  Active Hospital Problems    Diagnosis  POA    **Acute appendicitis [K35.80]  Yes      Resolved Hospital Problems   No resolved problems to display.       Presenting Problem/History of Present Illness  Acute appendicitis [K35.80]        Hospital Course  Patient is a 39 y.o. female who presented with acute appendicitis. On 4/6/2025 I performed a laparoscopic appendectomy.  Later that day, pain was well-controlled with oral pain medications. No nausea/vomiting.  No fevers/chills. Voiding urine spontaneously. Ambulating appropriately. The patient was thus found to be safe for discharge to home.         Procedures Performed    Procedure(s):  APPENDECTOMY LAPAROSCOPIC  -------------------       Consults:   Consults       No orders found for last 30 day(s).            Pertinent Test Results: N/A    Condition on Discharge:  Pain controlled with oral pain medication, tolerating diet, ambulating appropriately      Vital Signs  Temp:  [97.6 °F (36.4 °C)-100.2 °F (37.9 °C)] 98 °F (36.7 °C)  Heart Rate:  [78-98] 83  Resp:  [16-20] 16  BP: (123-168)/(70-97) 123/81    Discharge Disposition  Home or Self Care    Discharge Medications     Discharge Medications        New Medications        Instructions Start Date   docusate sodium 100 MG capsule   100 mg, Oral, 2 Times Daily PRN      oxyCODONE-acetaminophen 5-325 MG per tablet  Commonly known as: PERCOCET   1 tablet, Oral, Every 4 Hours PRN             Continue These Medications        Instructions Start Date   ibuprofen 600 MG tablet  Commonly known as: ADVIL,MOTRIN   600 mg, Oral, Every 6 Hours      prenatal vitamin 27-0.8 27-0.8 MG tablet tablet   Daily               Discharge Diet       Activity at Discharge  Activity Instructions       Discharge Activity      1) No driving until no longer taking narcotics.   2)  Return to school / work in 2 weeks.  3) May shower starting 4/7/2025. No tub baths. No swimming.  4) Do not lift / push / pull more than 15 lbs.            Follow-up Appointments  Future Appointments   Date Time Provider Department Center   4/21/2025 11:10 AM Tiana Gonzáles MD [facility]     Additional Instructions for the Follow-ups that You Need to Schedule       Discharge Follow-up with Specified Provider: Dr. Camp; 2 Weeks   As directed      To: Dr. Capm   Follow Up: 2 Weeks   Follow Up Details: Call [phone] to make an appointment        Notify Physician or Go To The ED For the Following Conditions   As directed      Fever >100.4, increased abdominal pain, persistent nausea/vomiting, new redness/drainage from incisions    Order Comments: Fever >100.4, increased abdominal pain, persistent nausea/vomiting, new redness/drainage from incisions                 Test Results Pending at Discharge  Pending Labs       Order Current Status    Tissue Pathology Exam Collected (04/06/25 6882)    Basic Metabolic Panel In process    Blood Culture - Blood, Arm, Left In process    Blood Culture - Blood, Arm, Left In process    Urine Culture - Urine, Urine, Clean Catch In process            Time: Discharge 15 min    Napoleon Camp MD   04/06/25   14:20 EDT

## 2025-04-06 NOTE — ANESTHESIA PREPROCEDURE EVALUATION
Anesthesia Evaluation     Patient summary reviewed and Nursing notes reviewed   NPO Solid Status: > 8 hours  NPO Liquid Status: > 8 hours           Airway   Mallampati: I  TM distance: >3 FB  Neck ROM: full  No difficulty expected  Dental      Pulmonary     breath sounds clear to auscultation  Cardiovascular     ECG reviewed  Rhythm: regular  Rate: normal    (+) murmur      Neuro/Psych  GI/Hepatic/Renal/Endo    (+) obesity    Musculoskeletal     Abdominal    Substance History      OB/GYN          Other                      Anesthesia Plan    ASA 2     general     intravenous induction     Anesthetic plan, risks, benefits, and alternatives have been provided, discussed and informed consent has been obtained with: patient.    Plan discussed with CRNA.    CODE STATUS:    Code Status (Patient has no pulse and is not breathing): CPR (Attempt to Resuscitate)  Medical Interventions (Patient has pulse or is breathing): Full  Level Of Support Discussed With: Patient

## 2025-04-06 NOTE — H&P
Patient Name:  Brenda Trejo  YOB: 1985  [id number]       Patient Care Team:  Geri Cohen PA-C as PCP - General (Physician Assistant)      General Surgery H&P note       Subjective      Brenda Trejo is a 39 y.o. female who is 1 month postpartum from .  2 days ago the patient had subjective chills and breast pain which was attributed to mastitis.  This pain continued and the patient developed vague lower abdominal pain leading to presentation to the emergency room on 2025.  This pain was associated with nausea and vomiting.  CT scan was performed showing acute appendicitis.  The patient was admitted to the hospital and antibiotic treatment was initiated.  Despite this the patient states the abdominal pain worsened overnight and now localizes to the right abdomen.    Allergy:   Allergies   Allergen Reactions    Penicillins Rash     Keflex OK    Sulfa Antibiotics Hives       Medications:  ceFOXitin, 2,000 mg, Intravenous, Q6H  famotidine, 20 mg, Oral, BID      sodium chloride, 100 mL/hr, Last Rate: 100 mL/hr (25 8824)      No current facility-administered medications on file prior to encounter.     Current Outpatient Medications on File Prior to Encounter   Medication Sig    ibuprofen (ADVIL,MOTRIN) 600 MG tablet Take 1 tablet by mouth Every 6 (Six) Hours.    Prenatal Vit-Fe Fumarate-FA (prenatal vitamin 27-0.8) 27-0.8 MG tablet tablet Take  by mouth Daily.       PMHx:   Past Medical History:   Diagnosis Date    Abnormal Pap smear of cervix     Allergic rhinitis     Anxiety     Asthma     controlled    C. difficile diarrhea     10/2021    Chronic back pain 2010    r/t trauma, failed back surgery x 2, denies NSAIDS/steroids/narcotics    Claustrophobia     Depression     Dyspepsia     Dyspnea on exertion     Eczema     on Dupixent    Fatigue     Gout     Migraine     Morbid obesity     Urinary tract infection        Past Surgical History:  Past Surgical History:    Procedure Laterality Date    BREAST BIOPSY  10/2019    CARPAL TUNNEL RELEASE Bilateral      SECTION N/A 3/6/2025    Procedure:  SECTION PRIMARY;  Surgeon: Tiana Gonzáles MD;  Location:  [facility];  Service: Obstetrics/Gynecology;  Laterality: N/A;    COLONOSCOPY      GASTRIC SLEEVE LAPAROSCOPIC N/A 2023    Procedure: GASTRIC SLEEVE LAPAROSCOPIC WITH DAVINCI ROBOT WITH ESOPHAGOGASTRODUODENOSCOPY;  Surgeon: Ashley Leong MD;  Location:  [facility];  Service: Robotics - DaVinci;  Laterality: N/A;    LUMBAR DISCECTOMY Left 2021    Procedure: LUMBAR MICRODISCECTOMY L5-S1;  Surgeon: James Estrada MD;  Location:  [facility];  Service: Neurosurgery;  Laterality: Left;    MOUTH SURGERY      Gum Grafts    TONSILLECTOMY AND ADENOIDECTOMY      TRIGGER FINGER RELEASE Right     WISDOM TOOTH EXTRACTION          Family History:   Family History   Problem Relation Age of Onset    Melanoma Father     Drug abuse Father     Breast cancer Mother     Colon cancer Mother     Stroke Maternal Grandfather     Diabetes Maternal Grandfather     Arthritis Maternal Grandfather     Breast cancer Maternal Aunt     Diabetes Maternal Aunt     Ovarian cancer Neg Hx     Uterine cancer Neg Hx     Prostate cancer Neg Hx     Deep vein thrombosis Neg Hx     Pulmonary embolism Neg Hx     Coronary artery disease Neg Hx     Osteoporosis Neg Hx     Hypertension Neg Hx         Social History:   Social History     Socioeconomic History    Marital status:      Spouse name: Carroll    Number of children: 1    Highest education level: Some college, no degree   Tobacco Use    Smoking status: Never     Passive exposure: Never    Smokeless tobacco: Never   Vaping Use    Vaping status: Never Used   Substance and Sexual Activity    Alcohol use: Not Currently     Alcohol/week: 1.0 standard drink of alcohol     Types: 1 Glasses of wine per week     Comment: rarely, every 3  "months    Drug use: Never    Sexual activity: Yes     Partners: Male     Birth control/protection: None         Review of Systems       Constitutional: See HPI   Eyes: Denies visual changes    Cardiovascular: Denies chest pain, palpitations   Pulmonary: Denies cough or shortness of breath   Abdominal/ GI: See HPI    Genitourinary: Denies dysuria or hematuria   Musculoskeletal: Denies any but chronic joint aches, pains or deformities   Psychiatric: No recent mood changes   Neurologic: No paresthesias or loss of function          Objective     Physical Exam:      Vital Signs  /77 (BP Location: Left arm, Patient Position: Sitting)   Pulse 90   Temp 98.1 °F (36.7 °C) (Oral)   Resp 16   Ht 160 cm (63\")   Wt 86.6 kg (191 lb)   LMP 06/24/2024 (Exact Date)   SpO2 98%   Breastfeeding No   BMI 33.83 kg/m²     Intake/Output Summary (Last 24 hours) at 4/6/2025 0708  Last data filed at 4/5/2025 2118  Gross per 24 hour   Intake 1000 ml   Output --   Net 1000 ml         Physical Exam:    Head: Normocephalic, atraumatic.   Eyes: Pupils equal, round, react to light and accommodation.   Mouth: Oral mucosa without lesions  Neck: No masses, lymphadenopathy or carotid bruits bilaterally  CV: Rhythm and rate regular, no murmurs, rubs or gallops  Lungs: Clear to auscultation bilaterally  Abdomen: Bowel sounds positive, soft, tender to palpation in right lower quadrant with focal rebound and guarding  Groin : No obvious hernias bilaterally  Extremities:  No cyanosis, clubbing or edema bilaterally  Lymphatics: No abnormal lymphadenopathy appreciated  Neurologic: No gross deficits      Results Review: I have personally reviewed all of the recent lab and imaging results available at this time.   WBC 9.3  Hemoglobin 11.3  Lactate 1  Creatinine 0.69    CT abdomen pelvis 4/5/2025:  Impression:  1.Acute appendicitis without evidence of perforation or abscess.  2.Postsurgical changes in the ventral abdominal wall and lower pelvis " consistent with recent .  3.Tiny hiatal hernia.  4.Unilateral left-sided pars interarticularis defect at the L5-S1 level.       Assessment & Plan     Assessment and Plan:    Patient with acute appendicitis with worsening abdominal pain this morning despite antibiotic treatment.  We discussed the risks, benefits, and alternatives to laparoscopic appendectomy and the patient was agreeable.  Will plan to take the patient emergently to the operating room this morning.  N.p.o., IV fluids, IV antibiotics until the time of operation.          Napoleon Camp MD  25  07:29 EDT

## 2025-04-07 LAB — BACTERIA SPEC AEROBE CULT: NORMAL

## 2025-04-08 LAB
CYTO UR: NORMAL
LAB AP CASE REPORT: NORMAL
LAB AP CLINICAL INFORMATION: NORMAL
PATH REPORT.FINAL DX SPEC: NORMAL
PATH REPORT.GROSS SPEC: NORMAL
QT INTERVAL: 384 MS
QTC INTERVAL: 448 MS

## 2025-04-10 LAB — BACTERIA SPEC AEROBE CULT: NORMAL

## 2025-04-11 ENCOUNTER — TELEPHONE (OUTPATIENT)
Dept: OBSTETRICS AND GYNECOLOGY | Facility: CLINIC | Age: 40
End: 2025-04-11
Payer: COMMERCIAL

## 2025-04-11 ENCOUNTER — HOSPITAL ENCOUNTER (EMERGENCY)
Facility: HOSPITAL | Age: 40
Discharge: HOME | End: 2025-04-11
Attending: EMERGENCY MEDICINE
Payer: COMMERCIAL

## 2025-04-11 VITALS
SYSTOLIC BLOOD PRESSURE: 157 MMHG | DIASTOLIC BLOOD PRESSURE: 94 MMHG | RESPIRATION RATE: 18 BRPM | HEART RATE: 74 BPM | HEIGHT: 63 IN | OXYGEN SATURATION: 99 % | WEIGHT: 192 LBS | BODY MASS INDEX: 34.02 KG/M2 | TEMPERATURE: 97.9 F

## 2025-04-11 DIAGNOSIS — T81.49XA CELLULITIS, WOUND, POST-OPERATIVE: ICD-10-CM

## 2025-04-11 DIAGNOSIS — T81.30XA WOUND DEHISCENCE: Primary | ICD-10-CM

## 2025-04-11 LAB — BACTERIA SPEC AEROBE CULT: NORMAL

## 2025-04-11 PROCEDURE — 99282 EMERGENCY DEPT VISIT SF MDM: CPT

## 2025-04-11 RX ORDER — CEPHALEXIN 500 MG/1
500 CAPSULE ORAL 3 TIMES DAILY
Qty: 21 CAPSULE | Refills: 0 | Status: SHIPPED | OUTPATIENT
Start: 2025-04-11 | End: 2025-04-18

## 2025-04-11 NOTE — ED PROVIDER NOTES
"Subjective   History of Present Illness  39-year-old female presents for evaluation of \"wound problem.\"  Of note, the patient is 5 weeks status post  by Dr. Larson of OB/GYN.  She is a G1, P1.  She notes no significant complications with her .  She notes that for the past couple of days her incision across her lower abdomen on the left side seems to have \"opened up\" and she notes that she has had a small amount of bleeding and drainage from the area.  She also noted some redness around the site as well.  She was concerned about potential infection and as a result came here to the ED to be evaluated.  She denies any fevers.  She denies any accompanying abdominal pain.  No purulence.  She otherwise feels well and has no other complaints at this time.      Review of Systems   Skin:  Positive for wound.   All other systems reviewed and are negative.      Past Medical History:   Diagnosis Date    Abnormal Pap smear of cervix     Allergic rhinitis     Anxiety     Asthma     controlled    C. difficile diarrhea     10/2021    Chronic back pain 2010    r/t trauma, failed back surgery x 2, denies NSAIDS/steroids/narcotics    Claustrophobia     Depression     Dyspepsia     Dyspnea on exertion     Eczema     on Dupixent    Fatigue     Gout     Migraine     Morbid obesity     Urinary tract infection        Allergies   Allergen Reactions    Penicillins Rash     Keflex OK    Sulfa Antibiotics Hives       Past Surgical History:   Procedure Laterality Date    APPENDECTOMY N/A 2025    Procedure: APPENDECTOMY LAPAROSCOPIC;  Surgeon: Napoleon Camp MD;  Location: [facility] OR;  Service: General;  Laterality: N/A;    BREAST BIOPSY  10/2019    CARPAL TUNNEL RELEASE Bilateral      SECTION N/A 3/6/2025    Procedure:  SECTION PRIMARY;  Surgeon: Tiana Gonzáles MD;  Location: [facility];  Service: Obstetrics/Gynecology;  Laterality: N/A;    COLONOSCOPY      GASTRIC SLEEVE " LAPAROSCOPIC N/A 11/7/2023    Procedure: GASTRIC SLEEVE LAPAROSCOPIC WITH DAVINCI ROBOT WITH ESOPHAGOGASTRODUODENOSCOPY;  Surgeon: Ashley Leong MD;  Location:  [facility];  Service: Robotics - DaVinci;  Laterality: N/A;    LUMBAR DISCECTOMY Left 12/23/2021    Procedure: LUMBAR MICRODISCECTOMY L5-S1;  Surgeon: James Estrada MD;  Location:  [facility];  Service: Neurosurgery;  Laterality: Left;    MOUTH SURGERY  2007    Gum Grafts    TONSILLECTOMY AND ADENOIDECTOMY  2021    TRIGGER FINGER RELEASE Right 2021    WISDOM TOOTH EXTRACTION  2007       Family History   Problem Relation Age of Onset    Melanoma Father     Drug abuse Father     Breast cancer Mother     Colon cancer Mother     Stroke Maternal Grandfather     Diabetes Maternal Grandfather     Arthritis Maternal Grandfather     Breast cancer Maternal Aunt     Diabetes Maternal Aunt     Ovarian cancer Neg Hx     Uterine cancer Neg Hx     Prostate cancer Neg Hx     Deep vein thrombosis Neg Hx     Pulmonary embolism Neg Hx     Coronary artery disease Neg Hx     Osteoporosis Neg Hx     Hypertension Neg Hx        Social History     Socioeconomic History    Marital status:      Spouse name: Carroll    Number of children: 1    Highest education level: Some college, no degree   Tobacco Use    Smoking status: Never     Passive exposure: Never    Smokeless tobacco: Never   Vaping Use    Vaping status: Never Used   Substance and Sexual Activity    Alcohol use: Not Currently     Alcohol/week: 1.0 standard drink of alcohol     Types: 1 Glasses of wine per week     Comment: rarely, every 3 months    Drug use: Never    Sexual activity: Yes     Partners: Male     Birth control/protection: None           Objective   Physical Exam  Vitals and nursing note reviewed.   Constitutional:       General: She is not in acute distress.     Appearance: She is well-developed. She is not diaphoretic.      Comments: Nontoxic-appearing female   HENT:      Head:  "Normocephalic and atraumatic.      Comments: No mucous membrane lesions present  Eyes:      Pupils: Pupils are equal, round, and reactive to light.   Cardiovascular:      Rate and Rhythm: Normal rate and regular rhythm.      Heart sounds: Normal heart sounds. No murmur heard.     No friction rub. No gallop.   Pulmonary:      Effort: Pulmonary effort is normal. No respiratory distress.      Breath sounds: Normal breath sounds. No wheezing or rales.   Abdominal:      General: Bowel sounds are normal. There is no distension.      Palpations: Abdomen is soft. There is no mass.      Tenderness: There is no abdominal tenderness. There is no guarding or rebound.      Comments: No focal abdominal tenderness, no peritoneal signs, no pain out of proportion to exam   Musculoskeletal:         General: Normal range of motion.   Skin:     General: Skin is warm and dry.      Findings: No erythema or rash.      Comments: Tiny area of wound dehiscence noted to the left lateral aspect of horizontally oriented surgical site of lower abdomen, mild surrounding erythema at area of wound dehiscence, no warmth present, no crepitus, no fluctuance, no purulence, no pain out of proportion to exam   Neurological:      Mental Status: She is alert and oriented to person, place, and time.   Psychiatric:         Mood and Affect: Mood normal.         Thought Content: Thought content normal.         Judgment: Judgment normal.         Procedures           ED Course  ED Course as of 25   174 39-year-old female presents for evaluation of \"wound problem.\"  Of note, the patient is 5 weeks status post  by Dr. Larson of OB/GYN.  She is a G1, P1.  She notes no significant complications with her .  She notes that for the past couple of days her incision across her lower abdomen on the left side seems to have \"opened up\" and she notes a small amount of bleeding and drainage from the incision site.  She also " "noted some redness around the site as well.  No purulence.  No fevers.  Given her  wound, she was concerned and came here to the ED to be evaluated.  On arrival, the patient is nontoxic-appearing.  There is a tiny area of wound dehiscence noted to the left lateral aspect of her horizontally oriented surgical site of her lower abdomen.  Mild erythema noted surrounding area of dehiscence without warmth present.  No crepitus.  No fluctuance.  No purulence.  No pain out of proportion to exam. [DD]   1743 Nonsurgical abdomen.  Vital signs reassuring.  Given the patient's well appearance and overall clinical picture I feel that she can be safely discharged and managed on an outpatient basis.  We will prescribe Keflex to conservatively treat her for potential early cellulitis.  We discussed keeping a close eye on the area and wound care management.  She will follow-up with Dr. Larson within the next week.  She will take her Keflex as directed.  Agreeable with plan and given appropriate strict return precautions. [DD]      ED Course User Index  [DD] Keagan Eden MD                                             No results found for this or any previous visit (from the past 24 hours).  Note: In addition to lab results from this visit, the labs listed above may include labs taken at another facility or during a different encounter within the last 24 hours. Please correlate lab times with ED admission and discharge times for further clarification of the services performed during this visit.    No orders to display     Vitals:    04/11/25 1657   BP: 157/94   BP Location: Left arm   Patient Position: Sitting   Pulse: 74   Resp: 18   Temp: 97.9 °F (36.6 °C)   TempSrc: Oral   SpO2: 99%   Weight: 87.1 kg (192 lb)   Height: 160 cm (63\")     Medications - No data to display  ECG/EMG Results (last 24 hours)       ** No results found for the last 24 hours. **          No orders to display                 Medical Decision " Making  Problems Addressed:  Cellulitis, wound, post-operative: complicated acute illness or injury  Wound dehiscence: complicated acute illness or injury    Risk  Prescription drug management.        Final diagnoses:   Wound dehiscence   Cellulitis, wound, post-operative       ED Disposition  ED Disposition       ED Disposition   Discharge    Condition   Stable    Comment   --               Tiana Gonzáles MD  [address] [facility] [address]  [place] [address]  [phone]               Medication List        New Prescriptions      cephalexin 500 MG capsule  Commonly known as: KEFLEX  Take 1 capsule by mouth 3 (Three) Times a Day for 7 days.               Where to Get Your Medications        These medications were sent to [address] [facility] - [phone]  - [phone] [address] [address]      Phone: [phone]   cephalexin 500 MG capsule            Keagan Eden MD  04/11/25 5277

## 2025-04-11 NOTE — TELEPHONE ENCOUNTER
MEGHNA pt  5 weeks PP    Patient had an emergency appendectomy on 04/06/25.    Patient reports that the left side of her incision is red and bleeding. Patient states she woke up and had a small amount of blood on her diaper, approximately the size of 2 quarters. Patient states it is still draining. Patient denies any fever, purulent drainage, or odor. Patient reports some warmth at that area. Patient states that incision is not open where it is draining. Informed patient that I would speak with a provider and call her back. Pt v/u.    Per EF, patient needs to go to the [facility] or ED. Patient notified & v/u.

## 2025-04-11 NOTE — TELEPHONE ENCOUNTER
Provider: DR. DEAN    Caller: Brenda Trejo    Relationship to Patient: Self    Pharmacy: [facility] IN [place]    Phone Number: [phone]     Reason for Call: INCISION SITE IS RED AND BLEEDING QUITE A BIT.  PATIENT STATES THE INCISION HAS HAD TEENY DROPS OF BLOOD THE WHOLE 5 WEEKS; HOWEVER, TODAY IT IS BLEEDING QUITE A BIT AND IS RED.    When was the patient last seen: 2025    When did it start: 2025    Where is it located:  INCISION    Timing- Is it constant or intermittent: CONSTANT    What makes it worse: NO    What makes it better: NO    What therapies/medications have you tried: NO

## 2025-04-15 PROBLEM — Z34.90 PRENATAL CARE: Status: RESOLVED | Noted: 2024-08-15 | Resolved: 2025-04-15

## 2025-04-15 PROBLEM — O36.5990 IUGR (INTRAUTERINE GROWTH RESTRICTION) AFFECTING CARE OF MOTHER: Status: RESOLVED | Noted: 2025-03-05 | Resolved: 2025-04-15

## 2025-04-15 PROBLEM — O99.210 OBESITY IN PREGNANCY, ANTEPARTUM: Status: RESOLVED | Noted: 2024-08-15 | Resolved: 2025-04-15

## 2025-04-16 ENCOUNTER — POSTPARTUM VISIT (OUTPATIENT)
Dept: OBSTETRICS AND GYNECOLOGY | Facility: CLINIC | Age: 40
End: 2025-04-16
Payer: COMMERCIAL

## 2025-04-16 VITALS
SYSTOLIC BLOOD PRESSURE: 126 MMHG | HEIGHT: 63 IN | WEIGHT: 189.4 LBS | BODY MASS INDEX: 33.56 KG/M2 | DIASTOLIC BLOOD PRESSURE: 82 MMHG

## 2025-04-16 NOTE — PROGRESS NOTES
Chief Complaint   Patient presents with    Postpartum Care       Postpartum Visit         Brenda Trejo is a 39 y.o.  who presents today for a 6 week(s) postpartum check.     C/S:  IUGR, unfavorable cervix and pelvis      , Low Transverse   Information for the patient's :  Osvaldo Trejo [[id number]]   3/6/2025   male   Osvaldo Trejo   2390 g (5 lb 4.3 oz)   Gestational Age: 37w0d       Baby Discharged: Discharged with Mom  Delivering Physician: Tiana Gonzáles MD    Her pregnancy was complicated by  IUGR . The incision does not seem to be healing properly. Pt taking keflex for opening in incision on left side of incision that is bleeding (saturating badage 6-7 times in past 2 days): prescribed by ER doctor on    Patient describes vaginal bleeding as absent.  Patient is pumping.  She desires  abstinence  for contraception.      Pt has had appendectomy on 2025    She would like to discuss the following complaints today: incision see above.    Patient denies concerns for postpartum depression/anxiety. Patient denies  suicidal or homicidal ideation. Her postpartum depression screening questionnaire: 0. No treatment is indicated      Last Pap : 10/25/2021. Results: negative. HPV: negative.   Last Completed Pap Smear    This patient has no relevant Health Maintenance data.           The additional following portions of the patient's history were reviewed and updated as appropriate: allergies, current medications, past family history, past medical history, past social history, past surgical history, and problem list.    Review of Systems   Constitutional: Negative.    HENT: Negative.     Eyes: Negative.    Respiratory: Negative.     Cardiovascular: Negative.    Gastrointestinal: Negative.    Endocrine: Negative.    Genitourinary: Negative.    Musculoskeletal: Negative.    Skin:  Positive for wound.   Allergic/Immunologic: Negative.    Neurological: Negative.   "  Hematological: Negative.    Psychiatric/Behavioral: Negative.       All other systems reviewed and are negative.     I have reviewed and agree with the HPI, ROS, and historical information as entered above. Tiana Gonzáles MD      /82   Ht 160 cm (63\")   Wt 85.9 kg (189 lb 6.4 oz)   LMP 2024 (Exact Date)   Breastfeeding Yes   BMI 33.55 kg/m²     Physical Exam  Vitals and nursing note reviewed. Exam conducted with a chaperone present.   Constitutional:       Appearance: She is well-developed.   HENT:      Head: Normocephalic and atraumatic.   Pulmonary:      Effort: Pulmonary effort is normal.   Abdominal:      General: A surgical scar is present.      Palpations: Abdomen is soft. Abdomen is not rigid.          Comments: Clean, Dry. No erythema.  Slight skin separation of approximately 7 mm on the left aspect of the incision.  No drainage noted today.  Recent appendectomy scars noted as well.   Genitourinary:     Vagina: No lesions or prolapsed vaginal walls.      Cervix: No lesion or erythema.      Uterus: Enlarged. Not tender and no uterine prolapse.       Adnexa:         Right: No mass, tenderness or fullness.          Left: No mass, tenderness or fullness.     Musculoskeletal:      Cervical back: Normal range of motion.   Neurological:      Mental Status: She is alert and oriented to person, place, and time.   Psychiatric:         Mood and Affect: Mood normal.         Behavior: Behavior normal.             Assessment and Plan    Problem List Items Addressed This Visit          Gravid and     Delivery of pregnancy by  section - Primary    Overview   3/6/2025-primary  section.  37 weeks.  IUGR.  Baby boy named Osvaldo weighing 5 pounds 1 ounce         Postpartum follow-up    Relevant Orders    LIQUID-BASED PAP SMEAR WITH HPV GENOTYPING REGARDLESS OF INTERPRETATION (MARCIA,COR,MAD)       S/p , 6 week(s) postpartum.  Doing well.    Return to normal physical " activity.  No pelvic restrictions.   Baby doing well.  Contraception: contraceptive methods: Condoms  Return in about 1 year (around 4/16/2026) for Annual physical.     Tiana Gonzáles MD  04/16/2025

## 2025-04-17 LAB — REF LAB TEST METHOD: NORMAL

## (undated) DEVICE — LAPAROVUE VISIBILITY SYSTEM LAPAROSCOPIC SOLUTIONS: Brand: LAPAROVUE

## (undated) DEVICE — PATIENT RETURN ELECTRODE, SINGLE-USE, CONTACT QUALITY MONITORING, ADULT, WITH 9FT CORD, FOR PATIENTS WEIGING OVER 33LBS. (15KG): Brand: MEGADYNE

## (undated) DEVICE — PK C/SECT 10

## (undated) DEVICE — SUT MNCRYL PLS ANTIB UD 4/0 PS2 18IN

## (undated) DEVICE — ADHS SKIN PREMIERPRO EXOFIN TOPICAL HI/VISC .5ML

## (undated) DEVICE — ENDOPATH XCEL BLADELESS TROCARS WITH STABILITY SLEEVES: Brand: ENDOPATH XCEL

## (undated) DEVICE — COATED VICRYL  (POLYGLACTIN 910) SUTURE, VIOLET BRAIDED, STERILE, SYNTHETIC ABSORBABLE SUTURE: Brand: COATED VICRYL

## (undated) DEVICE — APPL CHLORAPREP TINTED 26ML TEAL

## (undated) DEVICE — MAT PREVALON MOBL TRANSFR AIR WO/PAD 39X80IN

## (undated) DEVICE — LAPAROSCOPIC SMOKE FILTRATION SYSTEM: Brand: PALL LAPAROSHIELD® PLUS LAPAROSCOPIC SMOKE FILTRATION SYSTEM

## (undated) DEVICE — 4-PORT MANIFOLD: Brand: NEPTUNE 2

## (undated) DEVICE — SUT GUT CHRM 2/0 CT1 27IN 811H

## (undated) DEVICE — CLTH CLENS READYCLEANSE PERI CARE PK/5

## (undated) DEVICE — YANKAUER,BULB TIP,W/O VENT,RIGID,STERILE: Brand: MEDLINE

## (undated) DEVICE — PENCL SMOKE/EVAC MEGADYNE TELESCP 10FT

## (undated) DEVICE — GLV SURG BIOGEL LTX PF 6 1/2

## (undated) DEVICE — [HIGH FLOW INSUFFLATOR,  DO NOT USE IF PACKAGE IS DAMAGED,  KEEP DRY,  KEEP AWAY FROM SUNLIGHT,  PROTECT FROM HEAT AND RADIOACTIVE SOURCES.]: Brand: PNEUMOSURE

## (undated) DEVICE — SUT SILK 3/0 SH 30IN K832H

## (undated) DEVICE — SUT GUT CHRM 1 CTX 36IN 905H

## (undated) DEVICE — UNDERGLV SURG BIOGEL INDICATOR LF PF 7.5

## (undated) DEVICE — SUT VIC 4/0 KS 27IN VCP662H

## (undated) DEVICE — SOL IRR NACL 0.9PCT BO 1000ML

## (undated) DEVICE — SUT VIC 0 UR6 27IN VCP603H

## (undated) DEVICE — ENSEAL X1 TISSUE SEALER, CURVED JAW, 37 CM SHAFT LENGTH: Brand: ENSEAL

## (undated) DEVICE — TRY SPINE BLCK WHITACRE 25G 3X5IN

## (undated) DEVICE — BLANKT WARM UPPR/BDY ARM/OUT 57X196CM

## (undated) DEVICE — ENDOPATH XCEL UNIVERSAL TROCAR STABLILITY SLEEVES: Brand: ENDOPATH XCEL

## (undated) DEVICE — ENDOPOUCH RETRIEVER SPECIMEN RETRIEVAL BAGS: Brand: ENDOPOUCH RETRIEVER

## (undated) DEVICE — SUT PDS 1 TP1 48IN Z880G BX/12

## (undated) DEVICE — MINI ENDOCUT SCISSOR TIP, DISPOSABLE: Brand: RENEW

## (undated) DEVICE — ECHELON 3000 45 STANDARD: Brand: ECHELON

## (undated) DEVICE — SOL IRR H2O BO 1000ML STRL

## (undated) DEVICE — GLV SURG BIOGEL LTX PF 7

## (undated) DEVICE — PK LAP LASR CHOLE 10

## (undated) DEVICE — ENDOPATH XCEL BLUNT TIP TROCARS WITH SMOOTH SLEEVES: Brand: ENDOPATH XCEL

## (undated) DEVICE — TRAP FLD MINIVAC MEGADYNE 100ML